# Patient Record
Sex: FEMALE | Race: WHITE | NOT HISPANIC OR LATINO | Employment: UNEMPLOYED | URBAN - METROPOLITAN AREA
[De-identification: names, ages, dates, MRNs, and addresses within clinical notes are randomized per-mention and may not be internally consistent; named-entity substitution may affect disease eponyms.]

---

## 2016-08-15 LAB
CHOLEST SERPL-MSCNC: 183 MG/DL (ref 0–200)
HDLC SERPL-MCNC: 58 MG/DL
LDLC SERPL CALC-MCNC: 102 MG/DL
TRIGL SERPL-MCNC: 102 MG/DL

## 2017-01-06 ENCOUNTER — PATIENT MESSAGE (OUTPATIENT)
Dept: TRANSPLANT | Facility: CLINIC | Age: 37
End: 2017-01-06

## 2017-01-11 ENCOUNTER — TELEPHONE (OUTPATIENT)
Dept: TRANSPLANT | Facility: CLINIC | Age: 37
End: 2017-01-11

## 2017-02-21 ENCOUNTER — PATIENT MESSAGE (OUTPATIENT)
Dept: TRANSPLANT | Facility: CLINIC | Age: 37
End: 2017-02-21

## 2017-02-24 ENCOUNTER — PATIENT MESSAGE (OUTPATIENT)
Dept: TRANSPLANT | Facility: CLINIC | Age: 37
End: 2017-02-24

## 2017-02-27 ENCOUNTER — PATIENT MESSAGE (OUTPATIENT)
Dept: TRANSPLANT | Facility: CLINIC | Age: 37
End: 2017-02-27

## 2017-03-01 LAB
EXT ALBUMIN: 4
EXT ALKALINE PHOSPHATASE: 113
EXT ALT: 14
EXT AST: 26
EXT BILIRUBIN TOTAL: 0.5
EXT BUN: 18
EXT CALCIUM: 9.1
EXT CHLORIDE: 99
EXT CO2: 27.8
EXT CREATININE: 1.14 MG/DL
EXT GLUCOSE: 96
EXT HBV DNA, QUALITATIVE PCR: NOT DETECTED
EXT HEMATOCRIT: 37.8
EXT HEMOGLOBIN: 13.4
EXT HEP B S AG: NEGATIVE
EXT PLATELETS: 203
EXT POTASSIUM: 3
EXT PROTEIN TOTAL: 7.5
EXT SODIUM: 139 MMOL/L
EXT TACROLIMUS LVL: 4.6
EXT WBC: 5.9

## 2017-03-03 ENCOUNTER — PATIENT MESSAGE (OUTPATIENT)
Dept: TRANSPLANT | Facility: CLINIC | Age: 37
End: 2017-03-03

## 2017-03-03 ENCOUNTER — TELEPHONE (OUTPATIENT)
Dept: TRANSPLANT | Facility: CLINIC | Age: 37
End: 2017-03-03

## 2017-03-03 NOTE — TELEPHONE ENCOUNTER
----- Message from Quita Wynn MD sent at 3/3/2017  3:13 PM CST -----  The Labs are stable - please let patient know.

## 2017-04-27 ENCOUNTER — PATIENT MESSAGE (OUTPATIENT)
Dept: TRANSPLANT | Facility: CLINIC | Age: 37
End: 2017-04-27

## 2017-05-01 ENCOUNTER — OFFICE VISIT (OUTPATIENT)
Dept: TRANSPLANT | Facility: CLINIC | Age: 37
End: 2017-05-01
Payer: OTHER GOVERNMENT

## 2017-05-01 VITALS
RESPIRATION RATE: 20 BRPM | HEART RATE: 103 BPM | WEIGHT: 129.44 LBS | TEMPERATURE: 98 F | DIASTOLIC BLOOD PRESSURE: 107 MMHG | HEIGHT: 67 IN | OXYGEN SATURATION: 100 % | SYSTOLIC BLOOD PRESSURE: 146 MMHG | BODY MASS INDEX: 20.31 KG/M2

## 2017-05-01 DIAGNOSIS — T86.49 OTHER COMPLICATION OF LIVER TRANSPLANT: ICD-10-CM

## 2017-05-01 DIAGNOSIS — K83.1 BILIARY OBSTRUCTION: ICD-10-CM

## 2017-05-01 DIAGNOSIS — Z85.828 HISTORY OF SKIN CANCER: ICD-10-CM

## 2017-05-01 DIAGNOSIS — Z94.4 S/P LIVER TRANSPLANT: Primary | ICD-10-CM

## 2017-05-01 DIAGNOSIS — M06.9 RHEUMATOID ARTHRITIS OF WRIST, UNSPECIFIED LATERALITY, UNSPECIFIED RHEUMATOID FACTOR PRESENCE: ICD-10-CM

## 2017-05-01 DIAGNOSIS — N39.0 RECURRENT UTI: ICD-10-CM

## 2017-05-01 DIAGNOSIS — R06.02 SOB (SHORTNESS OF BREATH): ICD-10-CM

## 2017-05-01 DIAGNOSIS — R53.82 CHRONIC FATIGUE: ICD-10-CM

## 2017-05-01 DIAGNOSIS — Z29.89 PROPHYLACTIC IMMUNOTHERAPY: ICD-10-CM

## 2017-05-01 DIAGNOSIS — R41.3 SHORT-TERM MEMORY LOSS: ICD-10-CM

## 2017-05-01 DIAGNOSIS — F10.10 ALCOHOL ABUSE, CONTINUOUS: ICD-10-CM

## 2017-05-01 DIAGNOSIS — M81.8 OTHER OSTEOPOROSIS WITHOUT CURRENT PATHOLOGICAL FRACTURE: ICD-10-CM

## 2017-05-01 PROCEDURE — 99214 OFFICE O/P EST MOD 30 MIN: CPT | Mod: PBBFAC | Performed by: INTERNAL MEDICINE

## 2017-05-01 PROCEDURE — 99215 OFFICE O/P EST HI 40 MIN: CPT | Mod: S$PBB,,, | Performed by: INTERNAL MEDICINE

## 2017-05-01 PROCEDURE — 99999 PR PBB SHADOW E&M-EST. PATIENT-LVL IV: CPT | Mod: PBBFAC,,, | Performed by: INTERNAL MEDICINE

## 2017-05-01 RX ORDER — PRAMIPEXOLE DIHYDROCHLORIDE 0.25 MG/1
0.25 TABLET ORAL 2 TIMES DAILY
COMMUNITY
End: 2018-04-30 | Stop reason: ALTCHOICE

## 2017-05-01 NOTE — LETTER
May 1, 2017        Blair Pedro  [address]  INTER[facility]ICINE CLINIC  [place] [address]  Phone: [phone]  Fax: [phone]     Kin Chambers  [address]  [place] [address]  Phone: [phone]  Fax: [phone]             [facility]  [address]  [facility] [address]  Phone: [phone]   Patient: Jose Berman   MR Number: [id number]   YOB: 1980   Date of Visit: 5/1/2017       Dear Dr. Kin Chambers, Blair Pedro    Thank you for referring Jose Berman to me for evaluation. Attached you will find relevant portions of my assessment and plan of care.    If you have questions, please do not hesitate to call me. I look forward to following Jose Berman along with you.    Sincerely,    Quita Wynn MD    Enclosure    If you would like to receive this communication electronically, please contact [email or web address] or [phone] to request [employer] Link access.    [employer] is a tool which provides read-only access to select patient information with whom you have a relationship. Its easy to use and provides real time access to review your patients record including encounter summaries, notes, results, and demographic information.    If you feel you have received this communication in error or would no longer like to receive these types of communications, please e-mail [email or web address]

## 2017-05-01 NOTE — PATIENT INSTRUCTIONS
[place] [facility] [place] and treat high blood pressure.  2.will get all the records and i will review  3. Check Mg level with next blood work  4. awating repeat liver tests-do them every 2 months  Return 6 months

## 2017-05-01 NOTE — MR AVS SNAPSHOT
[facility]  [address]  [facility] [address]  Phone: [phone]                  Jose Berman   2017 10:00 AM   Office Visit    Description:  Female : 1980   Provider:  Quita Wynn MD   Department:  [facility]           Reason for Visit     Liver Transplant Follow-up           Diagnoses this Visit        Comments    S/P liver transplant    -  Primary            To Do List           Goals (5 Years of Data)     None      [facility]     [facility] Care [facility] -  Assistance  Unless otherwise directed by your provider, please contact [facility], our nurse care line that is available for  assistance.     Registered nurses in the [facility] provide: appointment scheduling, clinical advisement, health education, and other advisory services.  Call: [phone] (toll free)               Medications           Message regarding Medications     Verify the changes and/or additions to your medication regime listed below are the same as discussed with your clinician today.  If any of these changes or additions are incorrect, please notify your healthcare provider.             Verify that the below list of medications is an accurate representation of the medications you are currently taking.  If none reported, the list may be blank. If incorrect, please contact your healthcare provider. Carry this list with you in case of emergency.           Current Medications     adalimumab (HUMIRA) 40 mg/0.8 mL injection Inject 40 mg into the skin every 14 (fourteen) days.    aspirin 81 MG Chew Take 1 tablet (81 mg total) by mouth once daily.    docusate sodium (COLACE) 100 MG capsule Take 1 capsule (100 mg total) by mouth 2 (two) times daily as needed for Constipation.    duloxetine (CYMBALTA) 60 MG capsule Take 120 mg by mouth once daily.    fludrocortisone (FLORINEF) 0.1 mg Tab Take 1 tablet (100 mcg total) by mouth every other  "day.    folic acid (FOLVITE) 1 MG tablet Take 1 tablet (1 mg total) by mouth once daily.    furosemide (LASIX) 20 MG tablet Take 20 mg by mouth once daily.     lamivudine (EPIVIR) 150 MG Tab Take 1 tablet (150 mg total) by mouth once daily.    multivitamin (THERAGRAN) tablet Take 1 tablet by mouth once daily.    pantoprazole (PROTONIX) 40 MG tablet Take 1 tablet (40 mg total) by mouth once daily.    pramipexole (MIRAPEX) 0.25 MG tablet Take 0.25 mg by mouth 2 (two) times daily.    PROAIR HFA 90 mcg/actuation inhaler     promethazine (PHENERGAN) 12.5 MG Tab Take 1 tablet (12.5 mg total) by mouth every 6 (six) hours as needed.    SEROQUEL  mg Tb24     tacrolimus (PROGRAF) 1 MG Cap Take 4 capsules (4 mg total) by mouth every 12 (twelve) hours.    thiamine 100 MG tablet Take 100 mg by mouth once daily.    trazodone (DESYREL) 100 MG tablet Take 100 mg by mouth nightly as needed for Insomnia.    ursodiol (ACTIGALL) 500 MG tablet Take 1 tablet (500 mg total) by mouth 2 (two) times daily.    VITAMIN B-1 50 MG tablet     AFRIN, OXYMETAZOLINE, 0.05 % nasal spray     fluticasone (FLONASE) 50 mcg/actuation nasal spray            Clinical Reference Information           Your Vitals Were     BP Pulse Temp Resp Height Weight    146/107 (BP Location: Right arm, Patient Position: Sitting, BP Method: Automatic) 103 98.1 °F (36.7 °C) (Oral) 20 5' 7" (1.702 m) 58.7 kg (129 lb 6.6 oz)    SpO2 BMI             100% 20.27 kg/m2         Blood Pressure          Most Recent Value    BP  (!)  146/107      Allergies as of 5/1/2017     Methadone    Penicillins      Immunizations Administered on Date of Encounter - 5/1/2017     None      Maintenance Dialysis History     Patient has no recorded history of maintenance dialysis.      Transplant Information        Txp Date Organ Coordinator Care Team    8/26/2015 Liver Marie Ramos RN Current Hepatologist:  Quita Wynn MD   Referring Physician:  Kin Chambers MD   Corresponding " Physician:  Kin Chambers MD   Surgeon:  Dewayne Spann MD   Assisting Surgeon:  Justin Miles MD   Assisting Surgeon:  Oleksandr Benjamin MD   Assisting Surgeon:  Marilee Rodriguez MD   Current PCP:  Blair Pedor MD   Corresponding Physician:  Blair Pedro MD         Instructions    [place] and treat high blood pressure.  2.will get all the records and i will review  3. Check Mg level with next blood work  4. awating repeat liver tests-do them every 2 months  Return 6 months           Smoking Cessation     If you would like to quit smoking:   You may be eligible for free services if you are a [place] resident and started smoking cigarettes before September 1, 1988.  Call the Smoking Cessation Trust ([facility]) toll free at ([phone] or [phone].   Call 1-800-QUIT-NOW if you do not meet the above criteria.   Contact us via email: [email or web address]   View our website for more information: [email or web address]        Language Assistance Services     ATTENTION: Language assistance services are available, free of charge. Please call [phone].      ATENCIÓN: Si habla español, tiene a villalobos disposición servicios gratuitos de asistencia lingüística. Llame al [phone].     CHÚ Ý: N?u b?n nói Ti?ng Vi?t, có các d?ch v? h? tr? ngôn ng? mi?n phí dành cho b?n. G?i s? [phone].         [facility] complies with applicable Federal civil rights laws and does not discriminate on the basis of race, color, national origin, age, disability, or sex.

## 2017-05-01 NOTE — PROGRESS NOTES
Transplant Hepatology  Liver Transplant Recipient Follow-up    Transplant Date: 2015  UNOS Native Liver Dx: Alcoholic Cirrhosis    Jose is here for follow up of her liver transplant.    ORGAN: LIVER  Whole or Partial: whole liver  Donor Type:  - brain death  CDC High Risk: no  Donor CMV Status: positive  Donor HCV Status: negative  Donor HBcAb: positive  Biliary Anastomosis: end to end  Arterial Anatomy: standard  IVC reconstruction: end to end ivc  Portal vein status: patent    Mrs. Berman is a 35 year old female s/p OLTX on 8/26/15 2/2 alcoholic cirrhosis. Course complicated course with SHIVAM requiring HD, respiratory failure requiring trach (now extubated and with trach stoma sealed breathing on room air) on chronic immunosuppression with prograf    Subjective:     HPI: Jose is now 20 months post liver transplant.     Since liver transplant discharge home, she has been hospitalized x 2 for narcotic overdose since transplant and turned down an offer of rehab. We were told that she received narcotics from 5-6 MDs. She has previously denied this. Her understanding is that she had a drug-drug interaction (muscle relaxants and sleeping aids).Post liver transplant a PETH test was checked and it came back elevated at 146 which indicates moderate alcohol use. She does admit to one drink but denies excessive use.     Interval History: Since I last saw her she has had an extensive evaluation (EEG, MRI brain, cxr, 2D echo, sleep study) by rheumatology, ID, neurology and dermatology for joint pain, frequent falls, memory loss, alopecia, recurrent UTIs. This work up was done for fatigue and SOB. She tells me she has been dx with RA and is now on Humira every 2 weeks (has been on it for a month). The remainder of her w/u by report was unrevealing. She continues to have frequent UTIs (on a monthly basis). She continues to periodically have some lower extremity edema for which she takes lasix 20 mg prn. This  symptom as well as her pruritus seems to be much better since she started jamari.     She did not have skin cancer when she went to see the dermatology (she has a history).    Her labs have improved and thus a PTC was deferred. She he is now off cellcept and seems to be tolerating the decrease in immunosuppression. I will therefore continue monotherapy to prevent rejection.    She did have a bone density and it has revealed osteoporosis of the hip and osteopenia of the back. Not clear if she has seen an endocrinologist..    Review of Systems   Constitutional: Negative.    HENT: Negative.    Eyes: Negative.    Respiratory: Negative.    Cardiovascular: Negative.    Genitourinary: Negative.    Musculoskeletal: Negative.    Skin:        alopecia   Neurological: Negative.    Psychiatric/Behavioral: Negative.        Objective:     Physical Exam   Constitutional: She is oriented to person, place, and time. She appears well-developed and well-nourished.   HENT:   Head: Normocephalic and atraumatic.   Eyes: Conjunctivae and EOM are normal. Pupils are equal, round, and reactive to light. No scleral icterus.   Neck: Normal range of motion. Neck supple. No thyromegaly present.   Cardiovascular: Normal rate, regular rhythm and normal heart sounds.    Pulmonary/Chest: Effort normal and breath sounds normal. She has no rales.   Abdominal: Soft. Bowel sounds are normal. She exhibits no distension and no mass.   Musculoskeletal: Normal range of motion. She exhibits no edema.   Neurological: She is alert and oriented to person, place, and time.   Skin: Skin is warm and dry. No rash noted.   Psychiatric: She has a normal mood and affect.   Vitals reviewed.    Lab Results   Component Value Date    BILITOT 0.4 02/22/2016    AST 25 02/22/2016    ALT 28 02/22/2016    ALKPHOS 83 02/22/2016    CREATININE 1.1 02/22/2016    ALBUMIN 3.3 (L) 02/22/2016     Lab Results   Component Value Date    WBC 5.15 02/22/2016    HGB 10.8 (L) 02/22/2016    HCT  33.0 (L) 02/22/2016    HCT 29 (L) 10/15/2015     02/22/2016     Lab Results   Component Value Date    TACROLIMUS 5.0 02/22/2016     I have not labs to review today.    Assessment/Plan:   The patient is now ~20 months post liver transplant. Current recommendations:  1. History of hospitalizations for narcotic overdose and +PETH test. I still am recommending that she be enrolled in rehab locally. Due to insurance issues she can not be evaluated by addiction psych at our institution. Check tox and peth tests periodically.  2. Complication of liver transplant: dilated ducts suggestive of obstruction. Continue to defer PTC since liver numbers and pruritus are improved on jamari. Monitor  3. S/p liver transplant and control of IS: prograf target 4-7. Continue off cellcept. Check labs every 2 months  4. Alopecia. Improving on rhogain- continue.  4. History of skin cancer: recommend dermatologic follow up every 6 months.   5. Recurrent UTI: continues. I will obtain ID notes and review. Will minimize IS  6. Osteoporosis. Insurance denied eval at [facility]- [facility]  7. Fatigue and SOB: unclear etiology. I will review the records when we obtain them.  8. Short term memory loss of unclear etiology. I will review records form neurology.  9. Joint pain, by report RA: obtain records from rheumatologist. Ok to continue humira.  10. HTN: f/u with pcp.  11. Cramping in the hands and feet- check mg with labs    Return 6 month.    Quita Wynn MD           [facility] Patient Status  Functional Status: 70% - Cares for self: unable to carry on normal activity or active work  Physical Capacity: Limited Mobility  . . .

## 2017-05-04 ENCOUNTER — PATIENT MESSAGE (OUTPATIENT)
Dept: TRANSPLANT | Facility: CLINIC | Age: 37
End: 2017-05-04

## 2017-05-23 ENCOUNTER — TELEPHONE (OUTPATIENT)
Dept: TRANSPLANT | Facility: CLINIC | Age: 37
End: 2017-05-23

## 2017-06-22 ENCOUNTER — TELEPHONE (OUTPATIENT)
Dept: TRANSPLANT | Facility: CLINIC | Age: 37
End: 2017-06-22

## 2017-07-10 ENCOUNTER — PATIENT MESSAGE (OUTPATIENT)
Dept: TRANSPLANT | Facility: CLINIC | Age: 37
End: 2017-07-10

## 2017-07-10 LAB
EXT ALBUMIN: 3.8
EXT ALKALINE PHOSPHATASE: 88
EXT ALT: 12
EXT AST: 19
EXT BILIRUBIN TOTAL: 0.4
EXT BUN: 17
EXT CALCIUM: 9.1
EXT CHLORIDE: 109
EXT CO2: 25.5
EXT CREATININE: 0.89 MG/DL
EXT EOSINOPHIL%: 2.7
EXT GGT: 36
EXT GLUCOSE: 86
EXT HBV DNA, QUALITATIVE PCR: NOT DETECTED
EXT HEMATOCRIT: 43.4
EXT HEMOGLOBIN: 13.8
EXT HEP B S AG: REACTIVE
EXT LYMPH%: 38.1
EXT MAGNESIUM: 1.8
EXT MONOCYTES%: 8.6
EXT PLATELETS: 228
EXT POTASSIUM: 4.8
EXT PROTEIN TOTAL: 6.9
EXT SEGS%: 49.6
EXT SODIUM: 143 MMOL/L
EXT TACROLIMUS LVL: 9
EXT WBC: 5.1
Lab: NORMAL
PHOSPHATIDYLETHANOL (PETH): 39

## 2017-07-18 ENCOUNTER — TELEPHONE (OUTPATIENT)
Dept: TRANSPLANT | Facility: CLINIC | Age: 37
End: 2017-07-18

## 2017-07-18 ENCOUNTER — PATIENT MESSAGE (OUTPATIENT)
Dept: TRANSPLANT | Facility: CLINIC | Age: 37
End: 2017-07-18

## 2017-07-18 NOTE — TELEPHONE ENCOUNTER
----- Message from Quita Wynn MD sent at 7/17/2017  1:25 AM CDT -----  The Labs are stable - please let patient know.

## 2017-08-02 ENCOUNTER — TELEPHONE (OUTPATIENT)
Dept: TRANSPLANT | Facility: CLINIC | Age: 37
End: 2017-08-02

## 2017-08-02 ENCOUNTER — PATIENT MESSAGE (OUTPATIENT)
Dept: TRANSPLANT | Facility: CLINIC | Age: 37
End: 2017-08-02

## 2017-08-02 NOTE — TELEPHONE ENCOUNTER
----- Message from Quita Wynn MD sent at 8/1/2017 10:47 PM CDT -----  Contact her to stop drinking and to stop THC

## 2017-08-02 NOTE — TELEPHONE ENCOUNTER
Left message for pt to discuss positive alcohol and drug screen with no answer. Will continue to try and reach pt.

## 2017-08-14 RX ORDER — PANTOPRAZOLE SODIUM 40 MG/1
40 TABLET, DELAYED RELEASE ORAL DAILY
Qty: 90 TABLET | Refills: 3 | Status: SHIPPED | OUTPATIENT
Start: 2017-08-14 | End: 2018-11-19 | Stop reason: SDUPTHER

## 2017-08-14 RX ORDER — FLUDROCORTISONE ACETATE 0.1 MG/1
100 TABLET ORAL EVERY OTHER DAY
Qty: 45 TABLET | Refills: 3 | Status: SHIPPED | OUTPATIENT
Start: 2017-08-14 | End: 2018-11-19 | Stop reason: SDUPTHER

## 2017-08-14 RX ORDER — URSODIOL 500 MG/1
500 TABLET, FILM COATED ORAL 2 TIMES DAILY
Qty: 180 TABLET | Refills: 3 | Status: SHIPPED | OUTPATIENT
Start: 2017-08-14 | End: 2018-11-19 | Stop reason: SDUPTHER

## 2017-08-14 RX ORDER — FOLIC ACID 1 MG/1
1 TABLET ORAL DAILY
Qty: 90 TABLET | Refills: 3 | Status: SHIPPED | OUTPATIENT
Start: 2017-08-14 | End: 2019-08-27 | Stop reason: SDUPTHER

## 2017-08-14 RX ORDER — LAMIVUDINE 150 MG/1
150 TABLET, FILM COATED ORAL DAILY
Qty: 90 TABLET | Refills: 3 | Status: SHIPPED | OUTPATIENT
Start: 2017-08-14 | End: 2018-11-19 | Stop reason: SDUPTHER

## 2017-08-14 RX ORDER — TACROLIMUS 1 MG/1
4 CAPSULE ORAL EVERY 12 HOURS
Qty: 720 CAPSULE | Refills: 3 | Status: SHIPPED | OUTPATIENT
Start: 2017-08-14 | End: 2018-09-24 | Stop reason: SDUPTHER

## 2017-08-14 RX ORDER — NAPROXEN SODIUM 220 MG/1
81 TABLET, FILM COATED ORAL DAILY
Qty: 90 TABLET | Refills: 3 | Status: SHIPPED | OUTPATIENT
Start: 2017-08-14 | End: 2020-05-01 | Stop reason: SDUPTHER

## 2017-09-14 ENCOUNTER — TELEPHONE (OUTPATIENT)
Dept: TRANSPLANT | Facility: CLINIC | Age: 37
End: 2017-09-14

## 2017-09-14 NOTE — TELEPHONE ENCOUNTER
----- Message from Tyesha Meier sent at 9/14/2017  9:45 AM CDT -----  Called pt on 9/14/17, left a message to have lab's done on 7/18/17.

## 2017-09-29 ENCOUNTER — TELEPHONE (OUTPATIENT)
Dept: TRANSPLANT | Facility: CLINIC | Age: 37
End: 2017-09-29

## 2017-10-06 ENCOUNTER — TELEPHONE (OUTPATIENT)
Dept: TRANSPLANT | Facility: CLINIC | Age: 37
End: 2017-10-06

## 2017-10-06 NOTE — TELEPHONE ENCOUNTER
----- Message from Bhavna Quigley sent at 10/6/2017  8:11 AM CDT -----  Contact: pt  Patient calling to speak with pako. Patient has a few questions.       Please call        Thanks!!

## 2017-10-06 NOTE — TELEPHONE ENCOUNTER
Returned call answered questions, patient may possibly relocate and wanted to know about transferrring care.

## 2017-11-03 ENCOUNTER — PATIENT MESSAGE (OUTPATIENT)
Dept: TRANSPLANT | Facility: CLINIC | Age: 37
End: 2017-11-03

## 2017-11-14 ENCOUNTER — TELEPHONE (OUTPATIENT)
Dept: TRANSPLANT | Facility: CLINIC | Age: 37
End: 2017-11-14

## 2017-11-14 NOTE — TELEPHONE ENCOUNTER
No lab results received after multiple requests for pt to have labs done. Will send missed lab letter.

## 2017-12-20 ENCOUNTER — TELEPHONE (OUTPATIENT)
Dept: TRANSPLANT | Facility: CLINIC | Age: 37
End: 2017-12-20

## 2017-12-20 NOTE — TELEPHONE ENCOUNTER
No lab results received after multiple requests for pt to have labs done. Will send missed lab letter

## 2018-01-24 ENCOUNTER — TELEPHONE (OUTPATIENT)
Dept: TRANSPLANT | Facility: CLINIC | Age: 38
End: 2018-01-24

## 2018-01-24 NOTE — TELEPHONE ENCOUNTER
No labs or communication from pt after 2 missed lab letters, will send certified missed lab letter.

## 2018-04-24 ENCOUNTER — TELEPHONE (OUTPATIENT)
Dept: TRANSPLANT | Facility: CLINIC | Age: 38
End: 2018-04-24

## 2018-04-24 NOTE — TELEPHONE ENCOUNTER
----- Message from Rosa Elena Rosales sent at 4/24/2018 12:26 PM CDT -----  Contact: [facility]  Calling to get clarification on directions for lamivudine (EPIVIR) 150 MG Tab    [facility] [phone]

## 2018-04-30 ENCOUNTER — OFFICE VISIT (OUTPATIENT)
Dept: TRANSPLANT | Facility: CLINIC | Age: 38
End: 2018-04-30
Payer: MEDICAID

## 2018-04-30 VITALS
BODY MASS INDEX: 23.39 KG/M2 | SYSTOLIC BLOOD PRESSURE: 141 MMHG | HEIGHT: 67 IN | WEIGHT: 149.06 LBS | DIASTOLIC BLOOD PRESSURE: 100 MMHG | OXYGEN SATURATION: 99 % | RESPIRATION RATE: 16 BRPM | HEART RATE: 105 BPM | TEMPERATURE: 98 F

## 2018-04-30 DIAGNOSIS — K83.1 BILIARY OBSTRUCTION: ICD-10-CM

## 2018-04-30 DIAGNOSIS — Z94.4 S/P LIVER TRANSPLANT: Primary | ICD-10-CM

## 2018-04-30 DIAGNOSIS — Z85.828 HISTORY OF SKIN CANCER: ICD-10-CM

## 2018-04-30 DIAGNOSIS — F10.10 ALCOHOL ABUSE, CONTINUOUS: ICD-10-CM

## 2018-04-30 DIAGNOSIS — Z29.89 PROPHYLACTIC IMMUNOTHERAPY: ICD-10-CM

## 2018-04-30 DIAGNOSIS — M81.8 OTHER OSTEOPOROSIS WITHOUT CURRENT PATHOLOGICAL FRACTURE: ICD-10-CM

## 2018-04-30 DIAGNOSIS — N39.0 RECURRENT UTI: ICD-10-CM

## 2018-04-30 DIAGNOSIS — T86.49 OTHER COMPLICATION OF LIVER TRANSPLANT: ICD-10-CM

## 2018-04-30 PROCEDURE — 99214 OFFICE O/P EST MOD 30 MIN: CPT | Mod: PBBFAC | Performed by: INTERNAL MEDICINE

## 2018-04-30 PROCEDURE — 99215 OFFICE O/P EST HI 40 MIN: CPT | Mod: S$PBB,,, | Performed by: INTERNAL MEDICINE

## 2018-04-30 PROCEDURE — 99999 PR PBB SHADOW E&M-EST. PATIENT-LVL IV: CPT | Mod: PBBFAC,,, | Performed by: INTERNAL MEDICINE

## 2018-04-30 NOTE — PROGRESS NOTES
Transplant Hepatology  Liver Transplant Recipient Follow-up    Transplant Date: 2015  UNOS Native Liver Dx: Alcoholic Cirrhosis    Jose is here for follow up of her liver transplant.    ORGAN: LIVER  Whole or Partial: whole liver  Donor Type:  - brain death  CDC High Risk: no  Donor CMV Status: positive  Donor HCV Status: negative  Donor HBcAb: positive  Biliary Anastomosis: end to end  Arterial Anatomy: standard  IVC reconstruction: end to end ivc  Portal vein status: patent    Mrs. Berman is a 35 year old female s/p OLTX on 8/26/15 2/2 alcoholic cirrhosis. Course complicated course with SHIVAM requiring HD, respiratory failure requiring trach (now extubated and with trach stoma sealed breathing on room air) on chronic immunosuppression with prograf    Subjective:     HPI: Jose is now 32 months post liver transplant.  After she was discharged home from transplant she was hospitalized x 2 for narcotic overdose and turned down an offer of rehab. We were told that she received narcotics from 5-6 MDs. She also was drinking excessively as evidenced by an elevated peth test.    Interval History: She has not had labs since . She has gotten  and no longer has insurance through the . 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 She denies drinking alcohol. She has gained about 20 lbs. She denies drugs or alcohol use. She feels well with no N/V, abdominal pain or diarrhea.    She did have a bone density in 2016 and it did not reveal osteoporosis of the hip and osteopenia of the back. She is not seeing a dermatologist regularly to screen for skin cancer. She does have a history of skin cancer.    Review of Systems   Constitutional: Negative.    HENT: Negative.    Eyes: Negative.    Respiratory: Negative.    Cardiovascular: Negative.    Genitourinary: Negative.    Musculoskeletal: Negative.    Skin:        alopecia   Neurological: Negative.    Psychiatric/Behavioral:  Negative.        Objective:     Physical Exam   Constitutional: She is oriented to person, place, and time. She appears well-developed and well-nourished.   HENT:   Head: Normocephalic and atraumatic.   Eyes: Conjunctivae and EOM are normal. Pupils are equal, round, and reactive to light. No scleral icterus.   Neck: Normal range of motion. Neck supple. No thyromegaly present.   Cardiovascular: Normal rate, regular rhythm and normal heart sounds.    Pulmonary/Chest: Effort normal and breath sounds normal. She has no rales.   Abdominal: Soft. Bowel sounds are normal. She exhibits no distension and no mass.   Musculoskeletal: Normal range of motion. She exhibits no edema.   Neurological: She is alert and oriented to person, place, and time.   Skin: Skin is warm and dry. No rash noted.   Psychiatric: She has a normal mood and affect.   Vitals reviewed.    Lab Results   Component Value Date    BILITOT 0.4 02/22/2016    AST 25 02/22/2016    ALT 28 02/22/2016    ALKPHOS 83 02/22/2016    CREATININE 1.1 02/22/2016    ALBUMIN 3.3 (L) 02/22/2016     Lab Results   Component Value Date    WBC 5.15 02/22/2016    HGB 10.8 (L) 02/22/2016    HCT 33.0 (L) 02/22/2016    HCT 29 (L) 10/15/2015     02/22/2016     Lab Results   Component Value Date    TACROLIMUS 5.0 02/22/2016     I have not labs to review today.    Assessment/Plan:   The patient is now ~32 months post liver transplant. Current recommendations:  1. History of hospitalizations for narcotic overdose and +PETH test. Check tox and peth test today.  2. Complication of liver transplant: dilated ducts suggestive of obstruction. Continue to defer PTC if her liver numbers remain improved on jamari. Check labs today.  3. S/p liver transplant and control of IS: prograf target 4-7. Continue off cellcept. Check labs today every 2 months  4. Alopecia. Monitor; use rhogaine  4. History of skin cancer: recommend dermatologic follow up every 6 months.   5. Recurrent UTI: continues. I  will obtain ID notes and review. Will minimize IS  6. Osteoporosis.bone density now  7. Hx of utis- monitor      Return 12 months    Quita Wynn MD       Addendum: PATIENT DID NOT DO LABS TODAY AS INSTRUCTED    UNOS Patient Status  Functional Status: 70% - Cares for self: unable to carry on normal activity or active work  Physical Capacity: Limited Mobility  . . . .

## 2018-04-30 NOTE — PATIENT INSTRUCTIONS
1. Labs today  2. Standing order for labs  3. You will need regular mammograms, pap tests and dermatologist every 6 months  4. Bone density now  5. Monitor for UTIs  6. rhogaine ok for alopecia  Return 1 year

## 2018-04-30 NOTE — LETTER
May 6, 2018        Blair Pedro  [address]  INTERNAL MEDICINE CLINIC  [place] [address]  Phone: [phone]  Fax: [phone]     Kin Chambers  [address]  [place] [address]  Phone: [phone]  Fax: [phone]             [facility]  [address]  [facility] [address]  Phone: [phone]   Patient: Jose Berman   MR Number: [id number]   YOB: 1980   Date of Visit: 4/30/2018       Dear Dr. Kin Chambers, Blair Pedro    Thank you for referring Jose Berman to me for evaluation. Attached you will find relevant portions of my assessment and plan of care.    If you have questions, please do not hesitate to call me. I look forward to following Jose Berman along with you.    Sincerely,    Quita Wynn MD    Enclosure    If you would like to receive this communication electronically, please contact [email or web address] or [phone] to request [employer] access.    [employer] is a tool which provides read-only access to select patient information with whom you have a relationship. Its easy to use and provides real time access to review your patients record including encounter summaries, notes, results, and demographic information.    If you feel you have received this communication in error or would no longer like to receive these types of communications, please e-mail [email or web address]

## 2018-08-08 ENCOUNTER — PATIENT MESSAGE (OUTPATIENT)
Dept: TRANSPLANT | Facility: CLINIC | Age: 38
End: 2018-08-08

## 2018-08-17 ENCOUNTER — TELEPHONE (OUTPATIENT)
Dept: TRANSPLANT | Facility: CLINIC | Age: 38
End: 2018-08-17

## 2018-08-17 NOTE — TELEPHONE ENCOUNTER
Left voicemail for pt to contact office asap to schedule labs or will make status lost to follow.

## 2018-09-24 RX ORDER — TACROLIMUS 1 MG/1
4 CAPSULE ORAL EVERY 12 HOURS
Qty: 240 CAPSULE | Refills: 0 | Status: SHIPPED | OUTPATIENT
Start: 2018-09-24 | End: 2018-11-19 | Stop reason: SDUPTHER

## 2018-09-24 NOTE — TELEPHONE ENCOUNTER
Spoke to pt who is asking for refill of prograf sent to pharmacy. Informed pt she has not had transplant labs in over 1 year, pt has also not responded to phone calls, my [employer] messages, or missed lab letters. Pt states she will have labs done, will fax updated orders. Will send 1 month supply of medication as pt states she is almost currently out.

## 2018-09-24 NOTE — TELEPHONE ENCOUNTER
----- Message from Kasia Garcia sent at 9/24/2018 11:52 AM CDT -----  Contact: PATIENT  Needs Advice    Reason for call: Refill for prograf sent to [facility] [phone]        Communication Preference: [phone]    Additional Information: n/a

## 2018-09-28 ENCOUNTER — TELEPHONE (OUTPATIENT)
Dept: TRANSPLANT | Facility: CLINIC | Age: 38
End: 2018-09-28

## 2018-09-28 NOTE — TELEPHONE ENCOUNTER
----- Message from Edouard Hernandez sent at 9/28/2018  4:27 PM CDT -----  Contact: [facility]  [facility] calling for prior authorization on  tacrolimus (PROGRAF) 1 MG Cap medication for this patient.           Pharmacy number  [phone]        Thanks!

## 2018-10-05 ENCOUNTER — TELEPHONE (OUTPATIENT)
Dept: TRANSPLANT | Facility: CLINIC | Age: 38
End: 2018-10-05

## 2018-10-05 NOTE — TELEPHONE ENCOUNTER
Called pt's lab who states she has not had labs drawn after being sent certified letter and also being told she would be unable to be followed as a pt if she was not compliant with labs.

## 2018-11-15 LAB
EXT ALBUMIN: 3.9
EXT ALKALINE PHOSPHATASE: 188
EXT ALT: 23
EXT AST: 23
EXT BILIRUBIN TOTAL: 0.5
EXT BUN: 24
EXT CALCIUM: 8.5
EXT CHLORIDE: 108
EXT CO2: 27
EXT CREATININE: 0.92 MG/DL
EXT EOSINOPHIL%: 1.4
EXT GLUCOSE: 81
EXT HBV DNA, QUALITATIVE PCR: NOT DETECTED
EXT HEMATOCRIT: 39.1
EXT HEMOGLOBIN: 13
EXT HEP B S AG: NEGATIVE
EXT LYMPH%: 45.5
EXT MAGNESIUM: 2.2
EXT MONOCYTES%: 8.8
EXT PLATELETS: 210
EXT POTASSIUM: 4.4
EXT PROTEIN TOTAL: 8.3
EXT SEGS%: 43.5
EXT SODIUM: 140 MMOL/L
EXT TACROLIMUS LVL: 5.5
EXT WBC: 4.9
Lab: NORMAL
PHOSPHATIDYLETHANOL (PETH): NEGATIVE

## 2018-11-17 ENCOUNTER — PATIENT MESSAGE (OUTPATIENT)
Dept: TRANSPLANT | Facility: CLINIC | Age: 38
End: 2018-11-17

## 2018-11-19 RX ORDER — LAMIVUDINE 150 MG/1
150 TABLET, FILM COATED ORAL DAILY
Qty: 30 TABLET | Refills: 5 | Status: SHIPPED | OUTPATIENT
Start: 2018-11-19 | End: 2020-05-01 | Stop reason: SDUPTHER

## 2018-11-19 RX ORDER — FLUDROCORTISONE ACETATE 0.1 MG/1
100 TABLET ORAL EVERY OTHER DAY
Qty: 15 TABLET | Refills: 5 | Status: SHIPPED | OUTPATIENT
Start: 2018-11-19 | End: 2019-08-27 | Stop reason: SDUPTHER

## 2018-11-19 RX ORDER — PANTOPRAZOLE SODIUM 40 MG/1
40 TABLET, DELAYED RELEASE ORAL DAILY
Qty: 30 TABLET | Refills: 5 | Status: SHIPPED | OUTPATIENT
Start: 2018-11-19 | End: 2020-05-01 | Stop reason: SDUPTHER

## 2018-11-19 RX ORDER — TACROLIMUS 1 MG/1
4 CAPSULE ORAL EVERY 12 HOURS
Qty: 240 CAPSULE | Refills: 5 | Status: SHIPPED | OUTPATIENT
Start: 2018-11-19 | End: 2020-05-01 | Stop reason: SDUPTHER

## 2018-11-19 RX ORDER — URSODIOL 500 MG/1
500 TABLET, FILM COATED ORAL 2 TIMES DAILY
Qty: 60 TABLET | Refills: 5 | Status: SHIPPED | OUTPATIENT
Start: 2018-11-19 | End: 2020-05-01 | Stop reason: SDUPTHER

## 2018-11-20 ENCOUNTER — TELEPHONE (OUTPATIENT)
Dept: TRANSPLANT | Facility: CLINIC | Age: 38
End: 2018-11-20

## 2018-11-20 NOTE — TELEPHONE ENCOUNTER
----- Message from Quita Wynn MD sent at 11/19/2018  4:14 PM CST -----  The Labs are stable - please let patient know.

## 2018-11-20 NOTE — TELEPHONE ENCOUNTER
Labs reviewed are stable, continue q 3 months. Letter sent. Urged pt to remain compliant with labs.

## 2019-02-20 ENCOUNTER — TELEPHONE (OUTPATIENT)
Dept: TRANSPLANT | Facility: CLINIC | Age: 39
End: 2019-02-20

## 2019-02-20 NOTE — TELEPHONE ENCOUNTER
----- Message from Rohini Beck MA sent at 2/20/2019  3:09 PM CST -----  Attempted to contact patient several times to request her to go have her labs drawn locally at [facility] with no results.    2-19-19@10:30 am [facility] [phone] No labs this year per [facility].    2-19@10:37am [phone]  Fast busy 2x         @10:39am [phone]  Left message - answer machine    2-20@11:53am  [phone]  Fast busy 2x         @11:54am  [phone]  Left message - answer machine

## 2019-03-12 ENCOUNTER — TELEPHONE (OUTPATIENT)
Dept: TRANSPLANT | Facility: CLINIC | Age: 39
End: 2019-03-12

## 2019-03-12 NOTE — TELEPHONE ENCOUNTER
----- Message from Rosa Elena Rosales sent at 3/12/2019 11:18 AM CDT -----  Contact: [facility]  Pharmacy Calling    Reason for call: refill    Pharmacy Name: [facility]    Prescription Name: furosemide (LASIX) 20 MG tablet and duloxetine (CYMBALTA) 60 MG capsule    Phone Number: [phone]/Fax [phone]    Additional Information:

## 2019-04-25 ENCOUNTER — TELEPHONE (OUTPATIENT)
Dept: TRANSPLANT | Facility: CLINIC | Age: 39
End: 2019-04-25

## 2019-06-26 ENCOUNTER — TELEPHONE (OUTPATIENT)
Dept: TRANSPLANT | Facility: CLINIC | Age: 39
End: 2019-06-26

## 2019-07-29 ENCOUNTER — PATIENT MESSAGE (OUTPATIENT)
Dept: TRANSPLANT | Facility: CLINIC | Age: 39
End: 2019-07-29

## 2019-07-30 ENCOUNTER — PATIENT OUTREACH (OUTPATIENT)
Dept: ADMINISTRATIVE | Facility: HOSPITAL | Age: 39
End: 2019-07-30

## 2019-08-27 ENCOUNTER — TELEPHONE (OUTPATIENT)
Dept: FAMILY MEDICINE | Facility: CLINIC | Age: 39
End: 2019-08-27

## 2019-08-27 ENCOUNTER — OFFICE VISIT (OUTPATIENT)
Dept: FAMILY MEDICINE | Facility: CLINIC | Age: 39
End: 2019-08-27
Payer: MEDICAID

## 2019-08-27 VITALS
HEIGHT: 67 IN | HEART RATE: 84 BPM | BODY MASS INDEX: 22.16 KG/M2 | WEIGHT: 141.19 LBS | TEMPERATURE: 98 F | DIASTOLIC BLOOD PRESSURE: 89 MMHG | SYSTOLIC BLOOD PRESSURE: 128 MMHG | OXYGEN SATURATION: 98 % | RESPIRATION RATE: 14 BRPM

## 2019-08-27 DIAGNOSIS — M81.6 LOCALIZED OSTEOPOROSIS WITHOUT CURRENT PATHOLOGICAL FRACTURE: ICD-10-CM

## 2019-08-27 DIAGNOSIS — R06.02 SOB (SHORTNESS OF BREATH): ICD-10-CM

## 2019-08-27 DIAGNOSIS — Z12.39 BREAST CANCER SCREENING: ICD-10-CM

## 2019-08-27 DIAGNOSIS — F32.A DEPRESSION, UNSPECIFIED DEPRESSION TYPE: ICD-10-CM

## 2019-08-27 DIAGNOSIS — A54.9 GONORRHEA: ICD-10-CM

## 2019-08-27 DIAGNOSIS — F52.6 SEXUAL PAIN DISORDER: ICD-10-CM

## 2019-08-27 DIAGNOSIS — J30.2 SEASONAL ALLERGIES: ICD-10-CM

## 2019-08-27 DIAGNOSIS — B37.31 VAGINAL YEAST INFECTION: ICD-10-CM

## 2019-08-27 DIAGNOSIS — Z76.89 ENCOUNTER TO ESTABLISH CARE: Primary | ICD-10-CM

## 2019-08-27 DIAGNOSIS — N94.9 GYNECOLOGICAL DISORDER: ICD-10-CM

## 2019-08-27 DIAGNOSIS — R10.2 VAGINAL PAIN: ICD-10-CM

## 2019-08-27 DIAGNOSIS — N39.0 URINARY TRACT INFECTION WITHOUT HEMATURIA, SITE UNSPECIFIED: ICD-10-CM

## 2019-08-27 DIAGNOSIS — Z94.4 S/P LIVER TRANSPLANT: ICD-10-CM

## 2019-08-27 DIAGNOSIS — Z13.220 LIPID SCREENING: ICD-10-CM

## 2019-08-27 DIAGNOSIS — Z79.899 HIGH RISK MEDICATION USE: ICD-10-CM

## 2019-08-27 PROCEDURE — 96372 PR INJECTION,THERAP/PROPH/DIAG2ST, IM OR SUBCUT: ICD-10-PCS | Mod: S$GLB,,, | Performed by: NURSE PRACTITIONER

## 2019-08-27 PROCEDURE — 99205 OFFICE O/P NEW HI 60 MIN: CPT | Mod: 25,S$GLB,, | Performed by: NURSE PRACTITIONER

## 2019-08-27 PROCEDURE — 96372 THER/PROPH/DIAG INJ SC/IM: CPT | Mod: S$GLB,,, | Performed by: NURSE PRACTITIONER

## 2019-08-27 PROCEDURE — 87086 URINE CULTURE/COLONY COUNT: CPT

## 2019-08-27 PROCEDURE — 99205 PR OFFICE/OUTPT VISIT, NEW, LEVL V, 60-74 MIN: ICD-10-PCS | Mod: 25,S$GLB,, | Performed by: NURSE PRACTITIONER

## 2019-08-27 RX ORDER — ALBUTEROL SULFATE 90 UG/1
2 AEROSOL, METERED RESPIRATORY (INHALATION) EVERY 6 HOURS PRN
Qty: 18 G | Refills: 5 | Status: SHIPPED | OUTPATIENT
Start: 2019-08-27 | End: 2020-05-04 | Stop reason: SDUPTHER

## 2019-08-27 RX ORDER — FLUTICASONE PROPIONATE 50 MCG
1 SPRAY, SUSPENSION (ML) NASAL 2 TIMES DAILY
Qty: 1 BOTTLE | Refills: 5 | Status: SHIPPED | OUTPATIENT
Start: 2019-08-27 | End: 2020-05-04 | Stop reason: SDUPTHER

## 2019-08-27 RX ORDER — FLUDROCORTISONE ACETATE 0.1 MG/1
100 TABLET ORAL EVERY OTHER DAY
Qty: 15 TABLET | Refills: 5 | Status: SHIPPED | OUTPATIENT
Start: 2019-08-27 | End: 2023-10-18

## 2019-08-27 RX ORDER — FLUCONAZOLE 150 MG/1
150 TABLET ORAL DAILY
Qty: 1 TABLET | Refills: 0 | Status: SHIPPED | OUTPATIENT
Start: 2019-08-27 | End: 2019-08-28

## 2019-08-27 RX ORDER — CEFTRIAXONE 1 G/1
1 INJECTION, POWDER, FOR SOLUTION INTRAMUSCULAR; INTRAVENOUS
Status: COMPLETED | OUTPATIENT
Start: 2019-08-27 | End: 2019-08-27

## 2019-08-27 RX ORDER — FUROSEMIDE 20 MG/1
20 TABLET ORAL DAILY
Qty: 90 TABLET | Refills: 1 | Status: SHIPPED | OUTPATIENT
Start: 2019-08-27 | End: 2020-05-04 | Stop reason: SDUPTHER

## 2019-08-27 RX ORDER — DULOXETIN HYDROCHLORIDE 60 MG/1
120 CAPSULE, DELAYED RELEASE ORAL DAILY
Qty: 60 CAPSULE | Refills: 5 | Status: SHIPPED | OUTPATIENT
Start: 2019-08-27 | End: 2020-05-04 | Stop reason: SDUPTHER

## 2019-08-27 RX ORDER — FOLIC ACID 1 MG/1
1 TABLET ORAL DAILY
Qty: 90 TABLET | Refills: 3 | Status: SHIPPED | OUTPATIENT
Start: 2019-08-27 | End: 2023-08-04

## 2019-08-27 RX ADMIN — CEFTRIAXONE 1 G: 1 INJECTION, POWDER, FOR SOLUTION INTRAMUSCULAR; INTRAVENOUS at 03:08

## 2019-08-27 NOTE — PROGRESS NOTES
"Subjective:       Patient ID: Jose Berman is a 38 y.o. female.    Chief Complaint: Establish Care  New patient with significant PMH including liver transplant at [facility]  2014. Items on her diagnosis list MI, renal failure, CVA all associated during the time of her transplant when she developed complications, coded resulting in a 6 mo hospitalization.  She c/o residual SOB h/o tracheostomy with no recent CXR or PFT.     She was last seen by the transplant team 7/2018 with note viewed " HPI: Jose is now 32 months post liver transplant.  After she was discharged home from transplant she was hospitalized x 2 for narcotic overdose and turned down an offer of rehab. We were told that she received narcotics from 5-6 MDs. She also was drinking excessively as evidenced by an elevated peth test."    She reports recent separation from her partner and was diagnosed last week with Gonorrhea  yet continues to c/o vaginal odor, itching and very painful sex. She is due for a pap and asks about starting hormones, will refer to Gyn.         Past Medical History:   Diagnosis Date    Alcoholic liver failure 2014    Underwent transplant     Anxiety 1999    Bipolar 1 disorder, depressed 2004    CMV (cytomegalovirus)     Donor CMV Status: positive    ETOH abuse 08/27/2019    admits to social drinking    Heart attack 2015    s/p transplant complication and extended hospitalization     Hepatic steatosis 2014    Hepatitis B     Donor HBcAb: positive- taking lamivudine    Hypertension 2009    Kidney failure 2015    s/p transplant complication and extended hospitalization     Lumbago 1999    Macrocytic anemia 2014    Narcotic overdose 04/2018    hospitalized x 2- See 4/30/2018 office note    Osteoporosis 08/2016    Rheumatoid arthritis 5/1/2017    Skin cancer 1999, 2002, 2016    Stroke 2015    s/p transplant complication and extended hospitalization        Past Surgical History:   Procedure Laterality Date    BACK " SURGERY  2007    L4-L5 - Laminectomy    BELT ABDOMINOPLASTY  2013    BLADDER SUSPENSION  2004    BREAST SURGERY  2014    Breast Augmentation    CHOLECYSTECTOMY  2009    COLONOSCOPY N/A 7/8/2015    Performed by Eyad Garay MD at [facility] ENDO (2ND FLR)    DILATION AND CURETTAGE OF UTERUS      3 times     GASTRIC BYPASS  2009, 2010    HYSTERECTOMY  2008    INSERTION-PERM-A-CATH Right 9/25/2015    Performed by Joshua Goldberg, MD at [facility] OR 2ND FLR    LIVER TRANSPLANT  08/26/2015    Complication of liver transplant: dilated ducts suggestive of obstruction    PERMCATH REMOVAL-TUNNELED CVC REMOVAL Right 10/22/2015    Performed by Alexandra Kolb MD at [facility] CATH LAB    SKIN GRAFT  2013, 2012    TRANSPLANT-LIVER N/A 8/26/2015    Performed by Dewayne Spann MD at [facility] OR 2ND FLR        Social History     Socioeconomic History    Marital status:      Spouse name: Not on file    Number of children: 3    Years of education: Not on file    Highest education level: Bachelor's degree (e.g., BA, AB, BS)   Occupational History    Occupation: disbility -transplant    Social Needs    Financial resource strain: Not on file    Food insecurity:     Worry: Not on file     Inability: Not on file    Transportation needs:     Medical: Not on file     Non-medical: Not on file   Tobacco Use    Smoking status: Current Every Day Smoker     Packs/day: 0.50     Years: 2.00     Pack years: 1.00   Substance and Sexual Activity    Alcohol use: Yes     Comment: Rarely - once monthly    Drug use: Not Currently     Types: Marijuana, Amphetamines    Sexual activity: Yes     Partners: Male   Lifestyle    Physical activity:     Days per week: Not on file     Minutes per session: Not on file    Stress: Not on file   Relationships    Social connections:     Talks on phone: Not on file     Gets together: Not on file     Attends [employer] service: Not on file     Active member of club or organization: Not on file      Attends meetings of clubs or organizations: Not on file     Relationship status: Not on file   Other Topics Concern    Not on file   Social History Narrative    Not on file       Family History   Problem Relation Age of Onset    Alcohol abuse Father     Depression Father     Arthritis Father     Colon cancer Father 70    Depression Mother     Arthritis Mother        Review of patient's allergies indicates:   Allergen Reactions    Penicillins Hives and Shortness Of Breath          Current Outpatient Medications:     albuterol (PROAIR HFA) 90 mcg/actuation inhaler, Inhale 2 puffs into the lungs every 6 (six) hours as needed for Wheezing., Disp: 18 g, Rfl: 5    aspirin 81 MG Chew, Take 1 tablet (81 mg total) by mouth once daily., Disp: 90 tablet, Rfl: 3    calcium citrate/vitamin D3 (CITRACAL REGULAR ORAL), Take 1 tablet by mouth., Disp: , Rfl:     DULoxetine (CYMBALTA) 60 MG capsule, Take 2 capsules (120 mg total) by mouth once daily., Disp: 60 capsule, Rfl: 5    fludrocortisone (FLORINEF) 0.1 mg Tab, Take 1 tablet (100 mcg total) by mouth every other day., Disp: 15 tablet, Rfl: 5    fluticasone propionate (FLONASE) 50 mcg/actuation nasal spray, 1 spray (50 mcg total) by Each Nostril route 2 (two) times daily., Disp: 1 Bottle, Rfl: 5    folic acid (FOLVITE) 1 MG tablet, Take 1 tablet (1 mg total) by mouth once daily., Disp: 90 tablet, Rfl: 3    furosemide (LASIX) 20 MG tablet, Take 1 tablet (20 mg total) by mouth once daily., Disp: 90 tablet, Rfl: 1    lamiVUDine (EPIVIR) 150 MG Tab, Take 1 tablet (150 mg total) by mouth once daily., Disp: 30 tablet, Rfl: 5    multivitamin (THERAGRAN) tablet, Take 1 tablet by mouth once daily., Disp: 90 tablet, Rfl: 3    pantoprazole (PROTONIX) 40 MG tablet, Take 1 tablet (40 mg total) by mouth once daily., Disp: 30 tablet, Rfl: 5    promethazine (PHENERGAN) 12.5 MG Tab, Take 1 tablet (12.5 mg total) by mouth every 6 (six) hours as needed., Disp: 30 tablet, Rfl:  0    SEROQUEL  mg Tb24, Take 150 mg by mouth every evening. , Disp: , Rfl:     tacrolimus (PROGRAF) 1 MG Cap, Take 4 capsules (4 mg total) by mouth every 12 (twelve) hours., Disp: 240 capsule, Rfl: 5    thiamine 100 MG tablet, Take 100 mg by mouth once daily., Disp: , Rfl:     trazodone (DESYREL) 100 MG tablet, Take 100 mg by mouth nightly as needed for Insomnia., Disp: , Rfl:     ursodiol (ACTIGALL) 500 MG tablet, Take 1 tablet (500 mg total) by mouth 2 (two) times daily., Disp: 60 tablet, Rfl: 5    VITAMIN B-1 50 MG tablet, Take 100 mg by mouth once daily. , Disp: , Rfl:     fluconazole (DIFLUCAN) 150 MG Tab, Take 1 tablet (150 mg total) by mouth once daily. for 1 day, Disp: 1 tablet, Rfl: 0  No current facility-administered medications for this visit.     HPI  Review of Systems   Constitutional: Positive for fatigue.   HENT: Negative.    Eyes: Negative.    Respiratory: Positive for shortness of breath.    Cardiovascular: Negative.    Gastrointestinal: Negative.    Endocrine: Negative.    Genitourinary: Positive for vaginal pain.   Musculoskeletal: Negative.    Skin: Negative.    Allergic/Immunologic: Negative.    Neurological: Negative.    Hematological: Negative.    Psychiatric/Behavioral: Negative.        Objective:      Physical Exam   Constitutional: She is oriented to person, place, and time. She appears well-developed and well-nourished.   HENT:   Head: Normocephalic and atraumatic.   Mouth/Throat: Oropharynx is clear and moist.   Eyes: Pupils are equal, round, and reactive to light. Conjunctivae are normal. No scleral icterus.   Neck: Normal range of motion. Neck supple. No thyromegaly present.   Cardiovascular: Normal rate and regular rhythm.   No murmur heard.  Pulmonary/Chest: Effort normal. No respiratory distress.   Abdominal: Soft. Bowel sounds are normal. She exhibits no distension. There is no tenderness.   Musculoskeletal: Normal range of motion. She exhibits no edema.   Neurological:  She is alert and oriented to person, place, and time. No sensory deficit. She exhibits normal muscle tone.   Skin: Skin is warm. Capillary refill takes less than 2 seconds. No rash noted.   Psychiatric: She has a normal mood and affect. Her behavior is normal. Judgment and thought content normal.   Nursing note and vitals reviewed.      Assessment:       1. Encounter to establish care    2. S/P liver transplant 8/26 for ETOH cirrhosis    3. Seasonal allergies    4. Depression, unspecified depression type    5. Lipid screening    6. High risk medication use    7. Localized osteoporosis without current pathological fracture    8. Breast cancer screening    9. SOB (shortness of breath)    10. Gonorrhea    11. Vaginal yeast infection    12. Urinary tract infection without hematuria, site unspecified    13. Vaginal pain    14. Sexual pain disorder    15. Gynecological disorder        Plan:     1- fasting labs on another day  2- mammogram, DEXA, PFT and CXR ordered   3- rocephin today x 1  4- pt to self schedule with transplant team   5- pt to self schedule with Dermatology  6- RTC 4 weeks to discuss results  7- referred to Gyn  Gamal Hurtado    Encounter to establish care    S/P liver transplant 8/26 for ETOH cirrhosis  -     folic acid (FOLVITE) 1 MG tablet; Take 1 tablet (1 mg total) by mouth once daily.  Dispense: 90 tablet; Refill: 3  -     furosemide (LASIX) 20 MG tablet; Take 1 tablet (20 mg total) by mouth once daily.  Dispense: 90 tablet; Refill: 1  -     Lipid panel; Future; Expected date: 08/27/2019  -     Comprehensive metabolic panel; Future; Expected date: 08/27/2019  -     CBC auto differential; Future; Expected date: 08/27/2019  -     Vitamin D; Future; Expected date: 08/27/2019  -     TSH; Future; Expected date: 08/27/2019  -     T4, free; Future; Expected date: 08/27/2019  -     Cancel: Ambulatory referral to Gynecology    Seasonal allergies  -     fluticasone propionate (FLONASE) 50 mcg/actuation  nasal spray; 1 spray (50 mcg total) by Each Nostril route 2 (two) times daily.  Dispense: 1 Bottle; Refill: 5  -     albuterol (PROAIR HFA) 90 mcg/actuation inhaler; Inhale 2 puffs into the lungs every 6 (six) hours as needed for Wheezing.  Dispense: 18 g; Refill: 5    Depression, unspecified depression type  -     DULoxetine (CYMBALTA) 60 MG capsule; Take 2 capsules (120 mg total) by mouth once daily.  Dispense: 60 capsule; Refill: 5    Lipid screening  -     Lipid panel; Future; Expected date: 08/27/2019  -     Comprehensive metabolic panel; Future; Expected date: 08/27/2019  -     CBC auto differential; Future; Expected date: 08/27/2019  -     Vitamin D; Future; Expected date: 08/27/2019  -     TSH; Future; Expected date: 08/27/2019  -     T4, free; Future; Expected date: 08/27/2019    High risk medication use  -     Lipid panel; Future; Expected date: 08/27/2019  -     Comprehensive metabolic panel; Future; Expected date: 08/27/2019  -     CBC auto differential; Future; Expected date: 08/27/2019  -     Vitamin D; Future; Expected date: 08/27/2019  -     TSH; Future; Expected date: 08/27/2019  -     T4, free; Future; Expected date: 08/27/2019    Localized osteoporosis without current pathological fracture  -     DXA Bone Density Spine And Hip; Future; Expected date: 08/27/2019    Breast cancer screening  -     Mammo Digital Screening Bilat w/ Rafael; Future; Expected date: 08/27/2019    SOB (shortness of breath)  -     Complete PFT with bronchodilator; Future  -     PULSE OXIMETRY WITH REST - PULM; Future  -     X-Ray Chest PA And Lateral; Future; Expected date: 08/27/2019    Gonorrhea  -     cefTRIAXone injection 1 g    Vaginal yeast infection  -     fluconazole (DIFLUCAN) 150 MG Tab; Take 1 tablet (150 mg total) by mouth once daily. for 1 day  Dispense: 1 tablet; Refill: 0  -     Ambulatory referral to Gynecology    Urinary tract infection without hematuria, site unspecified  -     Urine culture  -     Ambulatory  referral to Gynecology    Vaginal pain  -     Cancel: Ambulatory referral to Gynecology  -     Ambulatory referral to Gynecology    Sexual pain disorder  -     Cancel: Ambulatory referral to Gynecology  -     Ambulatory referral to Gynecology    Gynecological disorder  -     Ambulatory referral to Gynecology    Other orders  -     fludrocortisone (FLORINEF) 0.1 mg Tab; Take 1 tablet (100 mcg total) by mouth every other day.  Dispense: 15 tablet; Refill: 5        Risks, benefits, and side effects were discussed with the patient. All questions were answered to the fullest satisfaction of the patient, and pt verbalized understanding and agreement to treatment plan. Pt was to call with any new or worsening symptoms, or present to the ER.

## 2019-08-27 NOTE — PATIENT INSTRUCTIONS
1- fasting labs on another day  2- mammogram, DEXA, PFT and CXR ordered   3- rocephin today x 1  4- pt to self schedule with transplant team   5- pt to self schedule with Dermatology  6- RTC 4 weeks to discuss results

## 2019-08-28 LAB — BACTERIA UR CULT: NORMAL

## 2019-08-30 ENCOUNTER — TELEPHONE (OUTPATIENT)
Dept: FAMILY MEDICINE | Facility: CLINIC | Age: 39
End: 2019-08-30

## 2019-08-30 DIAGNOSIS — Z94.4 S/P LIVER TRANSPLANT: Primary | ICD-10-CM

## 2019-08-30 NOTE — TELEPHONE ENCOUNTER
I spoke to the lab they said that since I can not link the other test with our that you can put them and the patient would need to tell them that she need to have them sent to the transplant team too.

## 2019-08-30 NOTE — TELEPHONE ENCOUNTER
Nuno Wynn Ms Cullen presented to me to establish care at [facility]. She is agreeable to labs. I asked my staff to link your lab orders to mine but the lab said they can not.    Can you please enter any blood work you wound like and they can all be drawn at the same time.    Thank you, Kim HUTCHINS

## 2019-08-30 NOTE — TELEPHONE ENCOUNTER
Please notify patient I have contacted Dr. Wynn asking her to enter transplant labs. When the patient has the blood work, it is her responsibility to let them know she has orders from two different [facility] providers so they draw all at one time. ty     She needs to be fasting

## 2019-09-06 ENCOUNTER — PATIENT MESSAGE (OUTPATIENT)
Dept: FAMILY MEDICINE | Facility: CLINIC | Age: 39
End: 2019-09-06

## 2019-09-06 ENCOUNTER — TELEPHONE (OUTPATIENT)
Dept: FAMILY MEDICINE | Facility: CLINIC | Age: 39
End: 2019-09-06

## 2019-09-06 NOTE — TELEPHONE ENCOUNTER
Please notify patient fasting lab orders have been entered by myself and the transplant team. Please offer to schedule and DO link duplicated labs as it is not necessary. ty

## 2019-09-10 ENCOUNTER — PATIENT OUTREACH (OUTPATIENT)
Dept: ADMINISTRATIVE | Facility: HOSPITAL | Age: 39
End: 2019-09-10

## 2019-09-24 ENCOUNTER — DOCUMENTATION ONLY (OUTPATIENT)
Dept: FAMILY MEDICINE | Facility: CLINIC | Age: 39
End: 2019-09-24

## 2019-09-24 RX ORDER — LAMIVUDINE 150 MG/1
150 TABLET, FILM COATED ORAL DAILY
Qty: 30 TABLET | Refills: 5 | Status: CANCELLED | OUTPATIENT
Start: 2019-09-24

## 2019-09-24 RX ORDER — PANTOPRAZOLE SODIUM 40 MG/1
40 TABLET, DELAYED RELEASE ORAL DAILY
Qty: 30 TABLET | Refills: 5 | Status: CANCELLED | OUTPATIENT
Start: 2019-09-24

## 2019-09-24 RX ORDER — URSODIOL 500 MG/1
500 TABLET, FILM COATED ORAL 2 TIMES DAILY
Qty: 60 TABLET | Refills: 5 | Status: CANCELLED | OUTPATIENT
Start: 2019-09-24

## 2019-09-24 RX ORDER — TACROLIMUS 1 MG/1
4 CAPSULE ORAL EVERY 12 HOURS
Qty: 240 CAPSULE | Refills: 5 | Status: CANCELLED | OUTPATIENT
Start: 2019-09-24

## 2019-09-24 NOTE — TELEPHONE ENCOUNTER
Please notify patient all medication request denied. Fasting labs were ordered by me and the transplant team. All transplant medication needs to be refilled by them. She needs blood work and follow up at [facility].

## 2019-09-24 NOTE — TELEPHONE ENCOUNTER
Medication refill request received and forwarded to provider for approval.  Patient missed visit today.

## 2019-09-25 NOTE — TELEPHONE ENCOUNTER
Called and LM for patient to return my call regarding medication denial.  She does have a lab appointment scheduled for Oct 5 2019.

## 2019-10-15 DIAGNOSIS — F32.A DEPRESSION, UNSPECIFIED DEPRESSION TYPE: ICD-10-CM

## 2019-10-15 RX ORDER — PANTOPRAZOLE SODIUM 40 MG/1
40 TABLET, DELAYED RELEASE ORAL DAILY
Qty: 30 TABLET | Refills: 5 | Status: CANCELLED | OUTPATIENT
Start: 2019-10-15

## 2019-10-15 RX ORDER — TACROLIMUS 1 MG/1
4 CAPSULE ORAL EVERY 12 HOURS
Qty: 240 CAPSULE | Refills: 5 | Status: CANCELLED | OUTPATIENT
Start: 2019-10-15

## 2019-10-15 RX ORDER — DULOXETIN HYDROCHLORIDE 60 MG/1
120 CAPSULE, DELAYED RELEASE ORAL DAILY
Qty: 60 CAPSULE | Refills: 5 | Status: CANCELLED | OUTPATIENT
Start: 2019-10-15

## 2019-10-15 RX ORDER — LAMIVUDINE 150 MG/1
150 TABLET, FILM COATED ORAL DAILY
Qty: 30 TABLET | Refills: 5 | Status: CANCELLED | OUTPATIENT
Start: 2019-10-15

## 2019-10-15 RX ORDER — URSODIOL 500 MG/1
500 TABLET, FILM COATED ORAL 2 TIMES DAILY
Qty: 60 TABLET | Refills: 5 | Status: CANCELLED | OUTPATIENT
Start: 2019-10-15

## 2019-10-15 NOTE — TELEPHONE ENCOUNTER
Please call pt and do not send message via PP. She needs her transplant labs and the fasting labs I have ordered prior to me providing refills.    All transplant medication must come from Transplant team. niya Alvarez

## 2019-10-15 NOTE — TELEPHONE ENCOUNTER
----- Message from Izabel Jose sent at 10/15/2019 12:39 PM CDT -----  Contact: Patient  Type:  RX Refill Request    Who Called: Patient  Refill or New Rx:  Refill  RX Name and Strength: tacrolimus (PROGRAF) 1 MG Cap, lamiVUDine (EPIVIR) 150 MG Tab, pantoprazole (PROTONIX) 40 MG tablet, DULoxetine (CYMBALTA) 60 MG capsule and ursodiol (ACTIGALL) 500 MG tablet   How is the patient currently taking it? (ex. 1XDay):  na  Is this a 30 day or 90 day RX:  na  Preferred Pharmacy with phone number:    [facility] - [place], [place] - [address]  [address]  [place] [address]  Phone: [phone] Fax: [phone]    Local or Mail Order:  Local  Ordering Provider:  Halie Centeno NP  Best Call Back Number:  [phone]  Additional Information: Calling to request a refill

## 2019-10-23 ENCOUNTER — TELEPHONE (OUTPATIENT)
Dept: TRANSPLANT | Facility: CLINIC | Age: 39
End: 2019-10-23

## 2020-01-20 ENCOUNTER — TELEPHONE (OUTPATIENT)
Dept: TRANSPLANT | Facility: CLINIC | Age: 40
End: 2020-01-20

## 2020-01-20 NOTE — TELEPHONE ENCOUNTER
Received call from patient who would like to schedule labs and get reestablished for post transplant care after being lost to follow. Pt states she will do labs tomorrow morning at [facility], once completed with send Dr. Wynn refills of transplant meds. Advised pt to call PCP office to arrange additional testing she missed. She verbalizes understanding.

## 2020-02-20 ENCOUNTER — PATIENT OUTREACH (OUTPATIENT)
Dept: ADMINISTRATIVE | Facility: HOSPITAL | Age: 40
End: 2020-02-20

## 2020-02-20 NOTE — LETTER
February 28, 2020    Jose Berman  [address] [place] [address]             [facility]  [address] [facility] [address]  Phone: [phone] [facility] is committed to your overall health and would like to ensure that you are up to date on your recommended test and/or procedures.   Halie Villagran NP  has found that your chart shows you may be due for the following:     TETANUS VACCINE due on 09/16/1996   Pneumococcal Vaccine (Highest Risk)(2 of 3 - PPSV23) due on 09/04/2015   Mammogram due on 09/16/2018   Influenza Vaccine(1) due on 09/01/2019       If you have had any of the above done at another facility, please let us know so that we may obtain copies from that facility.  If you have a copy of these records, please provide a copy for us to scan into your chart.  You are welcome to request that the report be faxed to us at  ([phone]).     Otherwise, please schedule these appointments at your earliest convenience by calling [phone] or going to [email or web address].     If you have an upcoming scheduled appointment for the item above, please disregard this letter.     Sincerely,   Your [facility] Team   LISET Tamayo L.P.N. Clinical Care Coordinator   [address] [place] [address], [place] [address] [phone]

## 2020-02-27 ENCOUNTER — PATIENT MESSAGE (OUTPATIENT)
Dept: FAMILY MEDICINE | Facility: CLINIC | Age: 40
End: 2020-02-27

## 2020-02-28 NOTE — PROGRESS NOTES
"Attempted to outreach patient for pre-visit via "[employer]", no answer after a week. Sending outreach via Mail Out Letter now.    "

## 2020-04-30 ENCOUNTER — TELEPHONE (OUTPATIENT)
Dept: TRANSPLANT | Facility: CLINIC | Age: 40
End: 2020-04-30

## 2020-04-30 ENCOUNTER — LAB VISIT (OUTPATIENT)
Dept: LAB | Facility: HOSPITAL | Age: 40
End: 2020-04-30
Attending: INTERNAL MEDICINE
Payer: MEDICAID

## 2020-04-30 DIAGNOSIS — Z94.4 S/P LIVER TRANSPLANT: ICD-10-CM

## 2020-04-30 DIAGNOSIS — Z94.4 S/P LIVER TRANSPLANT: Primary | ICD-10-CM

## 2020-04-30 LAB
ALBUMIN SERPL BCP-MCNC: 3.8 G/DL (ref 3.5–5.2)
ALP SERPL-CCNC: 224 U/L (ref 55–135)
ALT SERPL W/O P-5'-P-CCNC: 93 U/L (ref 10–44)
ANION GAP SERPL CALC-SCNC: 8 MMOL/L (ref 8–16)
AST SERPL-CCNC: 102 U/L (ref 10–40)
BILIRUB SERPL-MCNC: 0.8 MG/DL (ref 0.1–1)
BUN SERPL-MCNC: 16 MG/DL (ref 6–20)
CALCIUM SERPL-MCNC: 8.4 MG/DL (ref 8.7–10.5)
CHLORIDE SERPL-SCNC: 104 MMOL/L (ref 95–110)
CO2 SERPL-SCNC: 25 MMOL/L (ref 23–29)
CREAT SERPL-MCNC: 1 MG/DL (ref 0.5–1.4)
EST. GFR  (AFRICAN AMERICAN): >60 ML/MIN/1.73 M^2
EST. GFR  (NON AFRICAN AMERICAN): >60 ML/MIN/1.73 M^2
ETHANOL SERPL-MCNC: <5 MG/DL
GLUCOSE SERPL-MCNC: 119 MG/DL (ref 70–110)
POTASSIUM SERPL-SCNC: 3.8 MMOL/L (ref 3.5–5.1)
PROT SERPL-MCNC: 7.8 G/DL (ref 6–8.4)
SODIUM SERPL-SCNC: 137 MMOL/L (ref 136–145)

## 2020-04-30 PROCEDURE — 80320 DRUG SCREEN QUANTALCOHOLS: CPT

## 2020-04-30 PROCEDURE — 80307 DRUG TEST PRSMV CHEM ANLYZR: CPT

## 2020-04-30 PROCEDURE — 80321 ALCOHOLS BIOMARKERS 1OR 2: CPT

## 2020-04-30 PROCEDURE — 80053 COMPREHEN METABOLIC PANEL: CPT

## 2020-04-30 PROCEDURE — 80197 ASSAY OF TACROLIMUS: CPT

## 2020-04-30 PROCEDURE — 36415 COLL VENOUS BLD VENIPUNCTURE: CPT

## 2020-04-30 NOTE — TELEPHONE ENCOUNTER
Pt states she is on the way to the lab and would like to have labs done, informed pt it is 1:30 pm and she needs to have labs done in the morning prior to taking am meds. She states she purposely took meds at 1:00 am last night in preparation of knowing she would have a ride this afternoon. Will schedule labs and await results. Pt has been lost to follow due to non compliance and states she has 8 pills left of Tacrolimus. Informed pt would review with hepatologist once labs completed on refill. She verbalizes understanding.

## 2020-04-30 NOTE — TELEPHONE ENCOUNTER
----- Message from Tatyana Escobar sent at 4/30/2020  1:16 PM CDT -----  Contact: pt  Reason: Pt calling to ask if orders was placed she is on her way to labs now?    Communication: [phone]

## 2020-05-01 ENCOUNTER — PATIENT MESSAGE (OUTPATIENT)
Dept: FAMILY MEDICINE | Facility: CLINIC | Age: 40
End: 2020-05-01

## 2020-05-01 DIAGNOSIS — Z94.4 S/P LIVER TRANSPLANT: ICD-10-CM

## 2020-05-01 DIAGNOSIS — J30.2 SEASONAL ALLERGIES: ICD-10-CM

## 2020-05-01 DIAGNOSIS — F32.A DEPRESSION, UNSPECIFIED DEPRESSION TYPE: ICD-10-CM

## 2020-05-01 LAB — TACROLIMUS BLD-MCNC: 2.8 NG/ML (ref 5–15)

## 2020-05-01 RX ORDER — FLUTICASONE PROPIONATE 50 MCG
1 SPRAY, SUSPENSION (ML) NASAL 2 TIMES DAILY
Status: CANCELLED | OUTPATIENT
Start: 2020-05-01

## 2020-05-01 RX ORDER — PROMETHAZINE HYDROCHLORIDE 12.5 MG/1
12.5 TABLET ORAL EVERY 6 HOURS PRN
Qty: 30 TABLET | Refills: 0 | Status: CANCELLED | OUTPATIENT
Start: 2020-05-01

## 2020-05-01 RX ORDER — NAPROXEN SODIUM 220 MG/1
81 TABLET, FILM COATED ORAL DAILY
Qty: 90 TABLET | Refills: 3 | Status: SHIPPED | OUTPATIENT
Start: 2020-05-01 | End: 2020-05-05 | Stop reason: SDUPTHER

## 2020-05-01 RX ORDER — TACROLIMUS 1 MG/1
4 CAPSULE ORAL EVERY 12 HOURS
Qty: 240 CAPSULE | Refills: 5 | Status: SHIPPED | OUTPATIENT
Start: 2020-05-01 | End: 2020-05-04

## 2020-05-01 RX ORDER — ALBUTEROL SULFATE 90 UG/1
2 AEROSOL, METERED RESPIRATORY (INHALATION) EVERY 6 HOURS PRN
Qty: 18 G | Refills: 5 | Status: CANCELLED | OUTPATIENT
Start: 2020-05-01

## 2020-05-01 RX ORDER — FLUDROCORTISONE ACETATE 0.1 MG/1
100 TABLET ORAL EVERY OTHER DAY
Qty: 15 TABLET | Refills: 5 | Status: CANCELLED | OUTPATIENT
Start: 2020-05-01

## 2020-05-01 RX ORDER — FOLIC ACID 1 MG/1
1 TABLET ORAL DAILY
Qty: 90 TABLET | Refills: 3 | Status: CANCELLED | OUTPATIENT
Start: 2020-05-01

## 2020-05-01 RX ORDER — LAMIVUDINE 150 MG/1
150 TABLET, FILM COATED ORAL DAILY
Qty: 30 TABLET | Refills: 5 | Status: SHIPPED | OUTPATIENT
Start: 2020-05-01 | End: 2020-05-04

## 2020-05-01 RX ORDER — TACROLIMUS 1 MG/1
4 CAPSULE ORAL EVERY 12 HOURS
Qty: 240 CAPSULE | Refills: 5 | Status: SHIPPED | OUTPATIENT
Start: 2020-05-01 | End: 2020-05-05 | Stop reason: SDUPTHER

## 2020-05-01 RX ORDER — DULOXETIN HYDROCHLORIDE 60 MG/1
120 CAPSULE, DELAYED RELEASE ORAL DAILY
Qty: 60 CAPSULE | Refills: 5 | Status: CANCELLED | OUTPATIENT
Start: 2020-05-01

## 2020-05-01 RX ORDER — URSODIOL 500 MG/1
500 TABLET, FILM COATED ORAL 2 TIMES DAILY
Qty: 60 TABLET | Refills: 5 | Status: SHIPPED | OUTPATIENT
Start: 2020-05-01 | End: 2020-05-04

## 2020-05-01 RX ORDER — URSODIOL 500 MG/1
500 TABLET, FILM COATED ORAL 2 TIMES DAILY
Qty: 60 TABLET | Refills: 5 | Status: SHIPPED | OUTPATIENT
Start: 2020-05-01 | End: 2020-05-05 | Stop reason: SDUPTHER

## 2020-05-01 RX ORDER — LAMIVUDINE 150 MG/1
150 TABLET, FILM COATED ORAL DAILY
Qty: 30 TABLET | Refills: 5 | Status: SHIPPED | OUTPATIENT
Start: 2020-05-01 | End: 2020-05-05 | Stop reason: SDUPTHER

## 2020-05-01 RX ORDER — FUROSEMIDE 20 MG/1
20 TABLET ORAL DAILY
Qty: 90 TABLET | Refills: 1 | Status: CANCELLED | OUTPATIENT
Start: 2020-05-01

## 2020-05-01 RX ORDER — PANTOPRAZOLE SODIUM 40 MG/1
40 TABLET, DELAYED RELEASE ORAL DAILY
Qty: 30 TABLET | Refills: 5 | Status: SHIPPED | OUTPATIENT
Start: 2020-05-01 | End: 2020-05-04 | Stop reason: SDUPTHER

## 2020-05-01 RX ORDER — DEXTROAMPHETAMINE SACCHARATE, AMPHETAMINE ASPARTATE MONOHYDRATE, DEXTROAMPHETAMINE SULFATE AND AMPHETAMINE SULFATE 5; 5; 5; 5 MG/1; MG/1; MG/1; MG/1
CAPSULE, EXTENDED RELEASE ORAL
COMMUNITY
Start: 2019-09-25 | End: 2023-10-18

## 2020-05-04 ENCOUNTER — OFFICE VISIT (OUTPATIENT)
Dept: FAMILY MEDICINE | Facility: CLINIC | Age: 40
End: 2020-05-04
Payer: MEDICAID

## 2020-05-04 DIAGNOSIS — J30.2 SEASONAL ALLERGIES: ICD-10-CM

## 2020-05-04 DIAGNOSIS — F33.41 RECURRENT MAJOR DEPRESSIVE DISORDER, IN PARTIAL REMISSION: ICD-10-CM

## 2020-05-04 DIAGNOSIS — Z94.4 S/P LIVER TRANSPLANT: Primary | ICD-10-CM

## 2020-05-04 LAB
AMPHETAMINES SERPL QL: NEGATIVE
BARBITURATES SERPL QL SCN: NEGATIVE
BENZODIAZ SERPL QL SCN: NEGATIVE
BZE SERPL QL: NEGATIVE
CARBOXYTHC SERPL QL SCN: NEGATIVE
ETHANOL SERPL QL SCN: NEGATIVE
METHADONE SERPL QL SCN: NEGATIVE
OPIATES SERPL QL SCN: NEGATIVE
PCP SERPL QL SCN: NEGATIVE
PROPOXYPH SERPL QL: NEGATIVE

## 2020-05-04 PROCEDURE — 99213 PR OFFICE/OUTPT VISIT, EST, LEVL III, 20-29 MIN: ICD-10-PCS | Mod: GT,,, | Performed by: NURSE PRACTITIONER

## 2020-05-04 PROCEDURE — 99213 OFFICE O/P EST LOW 20 MIN: CPT | Mod: GT,,, | Performed by: NURSE PRACTITIONER

## 2020-05-04 RX ORDER — FLUTICASONE PROPIONATE 50 MCG
1 SPRAY, SUSPENSION (ML) NASAL 2 TIMES DAILY
Qty: 16 G | Refills: 5 | Status: SHIPPED | OUTPATIENT
Start: 2020-05-04 | End: 2023-08-04

## 2020-05-04 RX ORDER — FUROSEMIDE 20 MG/1
20 TABLET ORAL EVERY MORNING
Qty: 90 TABLET | Refills: 3 | Status: SHIPPED | OUTPATIENT
Start: 2020-05-04 | End: 2021-03-04

## 2020-05-04 RX ORDER — ALBUTEROL SULFATE 90 UG/1
2 AEROSOL, METERED RESPIRATORY (INHALATION) EVERY 6 HOURS PRN
Qty: 18 G | Refills: 5 | Status: SHIPPED | OUTPATIENT
Start: 2020-05-04 | End: 2021-01-27 | Stop reason: SDUPTHER

## 2020-05-04 RX ORDER — DULOXETIN HYDROCHLORIDE 60 MG/1
120 CAPSULE, DELAYED RELEASE ORAL DAILY
Qty: 180 CAPSULE | Refills: 1 | Status: SHIPPED | OUTPATIENT
Start: 2020-05-04 | End: 2021-03-23

## 2020-05-04 RX ORDER — ONDANSETRON 4 MG/1
4 TABLET, ORALLY DISINTEGRATING ORAL EVERY 12 HOURS
Qty: 90 TABLET | Refills: 5 | Status: SHIPPED | OUTPATIENT
Start: 2020-05-04 | End: 2023-08-04

## 2020-05-04 RX ORDER — ONDANSETRON 4 MG/1
4 TABLET, ORALLY DISINTEGRATING ORAL
COMMUNITY
End: 2020-05-04 | Stop reason: SDUPTHER

## 2020-05-04 RX ORDER — PANTOPRAZOLE SODIUM 40 MG/1
40 TABLET, DELAYED RELEASE ORAL DAILY
Qty: 90 TABLET | Refills: 1 | Status: SHIPPED | OUTPATIENT
Start: 2020-05-04 | End: 2021-05-12

## 2020-05-04 NOTE — PROGRESS NOTES
Subjective:       Patient ID: Jose Berman is a 39 y.o. female.    Chief Complaint: Medication Refill (Request refills on all medication except transplant meds)    The patient location is: Home [place]  The chief complaint leading to consultation is: Medications refills  Visit type: audiovisual  Total time spent with patient: 21  Each patient to whom he or she provides medical services by telemedicine is:  (1) informed of the relationship between the physician and patient and the respective role of any other health care provider with respect to management of the patient; and (2) notified that he or she may decline to receive medical services by telemedicine and may withdraw from such care at any time.    Notes:   The patient is a 39-year-old female with a past medical history of liver transplant, she reports doing well.  She presents today requesting refills on all medication except for her transplant meds.  She complains of daily chronic nausea, shortness of breath, fatigue and edema since her transplant yet symptoms are consistent and the transplant team is aware.  She underwent cardiology workup 3 years ago for the same symptoms that was reported normal.  MAR corrected by NP    Past Medical History:   Diagnosis Date    Alcoholic liver failure 2014    Underwent transplant     Anxiety 1999    Bipolar 1 disorder, depressed 2004    Bipolar 1 disorder, depressed     Informed from pt via Patient Portal     CMV (cytomegalovirus)     Donor CMV Status: positive    ETOH abuse 08/27/2019    admits to social drinking    Heart attack 2015    s/p transplant complication and extended hospitalization     Hepatic steatosis 2014    Hepatitis B     Donor HBcAb: positive- taking lamivudine    Hypertension 2009    Kidney failure 2015    s/p transplant complication and extended hospitalization     Lumbago 1999    Macrocytic anemia 2014    Narcotic overdose 04/2018    hospitalized x 2- See 4/30/2018 office note     Osteoporosis 08/2016    Rheumatoid arthritis 5/1/2017    Skin cancer 1999, 2002, 2016    Stroke 2015    s/p transplant complication and extended hospitalization        Past Surgical History:   Procedure Laterality Date    BACK SURGERY  2007    L4-L5 - Laminectomy    BELT ABDOMINOPLASTY  2013    BLADDER SUSPENSION  2004    BREAST SURGERY  2014    Breast Augmentation    CHOLECYSTECTOMY  2009    DILATION AND CURETTAGE OF UTERUS      3 times     GASTRIC BYPASS  2009, 2010    HYSTERECTOMY  2008    LIVER TRANSPLANT  08/26/2015    Complication of liver transplant: dilated ducts suggestive of obstruction    SKIN GRAFT  2013, 2012        Social History     Socioeconomic History    Marital status:      Spouse name: Not on file    Number of children: 3    Years of education: Not on file    Highest education level: Bachelor's degree (e.g., BA, AB, BS)   Occupational History    Occupation: disbility -transplant    Social Needs    Financial resource strain: Not on file    Food insecurity:     Worry: Not on file     Inability: Not on file    Transportation needs:     Medical: Not on file     Non-medical: Not on file   Tobacco Use    Smoking status: Current Every Day Smoker     Packs/day: 0.50     Years: 2.00     Pack years: 1.00   Substance and Sexual Activity    Alcohol use: Yes     Comment: Rarely - once monthly    Drug use: Not Currently     Types: Marijuana, Amphetamines    Sexual activity: Yes     Partners: Male   Lifestyle    Physical activity:     Days per week: Not on file     Minutes per session: Not on file    Stress: Not on file   Relationships    Social connections:     Talks on phone: Not on file     Gets together: Not on file     Attends Worship service: Not on file     Active member of club or organization: Not on file     Attends meetings of clubs or organizations: Not on file     Relationship status: Not on file   Other Topics Concern    Not on file   Social History Narrative     Not on file       Family History   Problem Relation Age of Onset    Alcohol abuse Father     Depression Father     Arthritis Father     Colon cancer Father 70    Depression Mother     Arthritis Mother        Review of patient's allergies indicates:   Allergen Reactions    Penicillins Hives and Shortness Of Breath          Current Outpatient Medications:     dextroamphetamine-amphetamine (ADDERALL XR) 20 MG 24 hr capsule, , Disp: , Rfl:     ondansetron (ZOFRAN-ODT) 4 MG TbDL, Take 1 tablet (4 mg total) by mouth every 12 (twelve) hours., Disp: 90 tablet, Rfl: 5    albuterol (PROAIR HFA) 90 mcg/actuation inhaler, Inhale 2 puffs into the lungs every 6 (six) hours as needed for Wheezing., Disp: 18 g, Rfl: 5    aspirin 81 MG Chew, Take 1 tablet (81 mg total) by mouth once daily., Disp: 90 tablet, Rfl: 3    calcium citrate/vitamin D3 (CITRACAL REGULAR ORAL), Take 1 tablet by mouth., Disp: , Rfl:     DULoxetine (CYMBALTA) 60 MG capsule, Take 2 capsules (120 mg total) by mouth once daily., Disp: 180 capsule, Rfl: 1    fludrocortisone (FLORINEF) 0.1 mg Tab, Take 1 tablet (100 mcg total) by mouth every other day., Disp: 15 tablet, Rfl: 5    fluticasone propionate (FLONASE) 50 mcg/actuation nasal spray, 1 spray (50 mcg total) by Each Nostril route 2 (two) times daily., Disp: 16 g, Rfl: 5    folic acid (FOLVITE) 1 MG tablet, Take 1 tablet (1 mg total) by mouth once daily., Disp: 90 tablet, Rfl: 3    furosemide (LASIX) 20 MG tablet, Take 1 tablet (20 mg total) by mouth every morning., Disp: 90 tablet, Rfl: 3    lamiVUDine (EPIVIR) 150 MG Tab, Take 1 tablet (150 mg total) by mouth once daily., Disp: 30 tablet, Rfl: 5    multivitamin (THERAGRAN) tablet, Take 1 tablet by mouth once daily., Disp: 90 tablet, Rfl: 3    pantoprazole (PROTONIX) 40 MG tablet, Take 1 tablet (40 mg total) by mouth once daily., Disp: 90 tablet, Rfl: 1    tacrolimus (PROGRAF) 1 MG Cap, Take 4 capsules (4 mg total) by mouth every 12  (twelve) hours., Disp: 240 capsule, Rfl: 5    thiamine 100 MG tablet, Take 100 mg by mouth once daily., Disp: , Rfl:     ursodioL (ACTIGALL) 500 MG tablet, Take 1 tablet (500 mg total) by mouth 2 (two) times daily., Disp: 60 tablet, Rfl: 5    VITAMIN B-1 50 MG tablet, Take 100 mg by mouth once daily. , Disp: , Rfl:     HPI  Review of Systems   Constitutional: Positive for fatigue.   HENT: Negative.    Eyes: Negative.    Respiratory: Positive for shortness of breath.    Cardiovascular: Positive for leg swelling.   Gastrointestinal: Positive for nausea.   Endocrine: Negative.    Genitourinary: Negative.    Musculoskeletal: Negative.    Skin: Negative.    Allergic/Immunologic: Negative.    Neurological: Negative.    Hematological: Negative.    Psychiatric/Behavioral: Negative.        Objective:      Physical Exam   Constitutional: She is oriented to person, place, and time. She appears well-developed and well-nourished. No distress.   HENT:   Head: Normocephalic.   Neck: Normal range of motion.   Pulmonary/Chest: Effort normal.   Spoke in full sentences without sob   Musculoskeletal: Normal range of motion.   Neurological: She is alert and oriented to person, place, and time.   Skin: No rash noted. No pallor.   Psychiatric: She has a normal mood and affect. Her behavior is normal. Judgment and thought content normal.       Assessment:       1. S/P liver transplant 8/26 for ETOH cirrhosis    2. Seasonal allergies    3. Recurrent major depressive disorder, in partial remission        Plan:     1.  All medications refilled for 6 months  2.  Return to clinic as needed    S/P liver transplant 8/26 for ETOH cirrhosis  -     furosemide (LASIX) 20 MG tablet; Take 1 tablet (20 mg total) by mouth every morning.  Dispense: 90 tablet; Refill: 3  -     pantoprazole (PROTONIX) 40 MG tablet; Take 1 tablet (40 mg total) by mouth once daily.  Dispense: 90 tablet; Refill: 1  -     ondansetron (ZOFRAN-ODT) 4 MG TbDL; Take 1 tablet (4  mg total) by mouth every 12 (twelve) hours.  Dispense: 90 tablet; Refill: 5    Seasonal allergies  -     albuterol (PROAIR HFA) 90 mcg/actuation inhaler; Inhale 2 puffs into the lungs every 6 (six) hours as needed for Wheezing.  Dispense: 18 g; Refill: 5  -     fluticasone propionate (FLONASE) 50 mcg/actuation nasal spray; 1 spray (50 mcg total) by Each Nostril route 2 (two) times daily.  Dispense: 16 g; Refill: 5    Recurrent major depressive disorder, in partial remission  -     DULoxetine (CYMBALTA) 60 MG capsule; Take 2 capsules (120 mg total) by mouth once daily.  Dispense: 180 capsule; Refill: 1        Risks, benefits, and side effects were discussed with the patient. All questions were answered to the fullest satisfaction of the patient, and pt verbalized understanding and agreement to treatment plan. Pt was to call with any new or worsening symptoms, or present to the ER.

## 2020-05-06 NOTE — TELEPHONE ENCOUNTER
----- Message from Adalgisa Blue RN sent at 5/6/2020  1:20 PM CDT -----  Contact: PT       ----- Message -----  From: Vida Dozier  Sent: 5/6/2020  12:04 PM CDT  To: Singh Montalvo Staff    PT needs a PA for her prograf. The pharmacy said bc she needs to have 8 pills a day, a PA is needed. Said the pharmacy sent something over about it. Please call back     Callback: [phone]

## 2020-05-06 NOTE — TELEPHONE ENCOUNTER
PA completed for Tacrolimus with [facility] with [facility].

## 2020-05-07 LAB — PHOSPHATIDYLETHANOL (PETH): NEGATIVE NG/ML

## 2020-05-07 RX ORDER — TACROLIMUS 1 MG/1
4 CAPSULE ORAL EVERY 12 HOURS
Qty: 240 CAPSULE | Refills: 5 | Status: SHIPPED | OUTPATIENT
Start: 2020-05-07 | End: 2021-01-29 | Stop reason: SDUPTHER

## 2020-05-07 RX ORDER — LAMIVUDINE 150 MG/1
150 TABLET, FILM COATED ORAL DAILY
Qty: 30 TABLET | Refills: 5 | Status: SHIPPED | OUTPATIENT
Start: 2020-05-07 | End: 2021-01-29 | Stop reason: SDUPTHER

## 2020-05-07 RX ORDER — URSODIOL 500 MG/1
500 TABLET, FILM COATED ORAL 2 TIMES DAILY
Qty: 60 TABLET | Refills: 5 | Status: SHIPPED | OUTPATIENT
Start: 2020-05-07 | End: 2021-01-29 | Stop reason: SDUPTHER

## 2020-05-07 RX ORDER — NAPROXEN SODIUM 220 MG/1
81 TABLET, FILM COATED ORAL DAILY
Qty: 90 TABLET | Refills: 3 | Status: SHIPPED | OUTPATIENT
Start: 2020-05-07 | End: 2023-08-04 | Stop reason: SDUPTHER

## 2020-05-12 ENCOUNTER — TELEPHONE (OUTPATIENT)
Dept: TRANSPLANT | Facility: CLINIC | Age: 40
End: 2020-05-12

## 2020-05-12 DIAGNOSIS — Z94.4 S/P LIVER TRANSPLANT: Primary | ICD-10-CM

## 2020-05-12 NOTE — LETTER
May 12, 2020    Jose Berman  [address] [facility] [place] [address]          Dear Garciajesusita Berman:  MRN: [id number]    This is a follow up to your recent labs, your lab results were stable but elevated.  There are no medicine changes.  Please have your labs drawn again on 6/22/20.      If you cannot have your labs drawn on the scheduled date, it is your responsibility to call the transplant department to reschedule.  To reschedule or make an appointment, please as to speak to or leave a message for my assistant, Felisha Webber or Rohini, at [phone].  When leaving a message for Felisha Webber Angela or myself, we ask that you leave a brief message regarding your request.    Sincerely,    Marie Ramos, RN,BSN,CCTC    Your Liver Transplant Coordinator    [facility]  [address] [place] [address]  [phone]

## 2020-05-12 NOTE — TELEPHONE ENCOUNTER
----- Message from Gil Rice MD sent at 5/4/2020  9:47 AM CDT -----  Results reviewed. Please advise labs are stable.

## 2020-05-20 ENCOUNTER — PATIENT MESSAGE (OUTPATIENT)
Dept: ADMINISTRATIVE | Facility: HOSPITAL | Age: 40
End: 2020-05-20

## 2020-06-24 ENCOUNTER — TELEPHONE (OUTPATIENT)
Dept: TRANSPLANT | Facility: CLINIC | Age: 40
End: 2020-06-24

## 2020-06-24 NOTE — LETTER
June 24, 2020    Jose Berman  [address] [facility] [place] [address]          Dear Garciajesusita Berman:  MRN: [id number]    Your lab work was due to be drawn on 6/22/20.  We contacted your lab and were unable to get test results.  It is very important to get your labs drawn as scheduled.  We cannot monitor you for rejection, infections, or drug toxicity side effects without lab results.  Please call us at [phone] as soon as possible to let us know when you plan to have labs drawn.    Sincerely,    Marie Ramos, RN,BSN,CCTC    Your Liver Transplant Coordinator    [facility]  [address] [place] [address]  [phone]

## 2020-07-10 ENCOUNTER — TELEPHONE (OUTPATIENT)
Dept: FAMILY MEDICINE | Facility: CLINIC | Age: 40
End: 2020-07-10

## 2020-07-10 NOTE — TELEPHONE ENCOUNTER
----- Message from Madhav Hauser sent at 7/10/2020  1:55 PM CDT -----  Regarding: Pharm follow up  Contact: [facility]  Type:  Patient Returning Call    Who Called:  Shyam  Does the patient know what this is regarding?:  Please follow up with new script for pt to get med refill for folic acid (FOLVITE) 1 MG tablet and fludrocortisone (FLORINEF) 0.1 mg Tab  Best Call Back Number:    Additional Information:  Please follow up. Thank you

## 2020-07-21 ENCOUNTER — TELEPHONE (OUTPATIENT)
Dept: TRANSPLANT | Facility: CLINIC | Age: 40
End: 2020-07-21

## 2020-07-21 NOTE — TELEPHONE ENCOUNTER
No communication received from patient after missed lab letter. Will keep pt's status as not followed, unable to contact pt.

## 2020-10-05 ENCOUNTER — PATIENT MESSAGE (OUTPATIENT)
Dept: ADMINISTRATIVE | Facility: HOSPITAL | Age: 40
End: 2020-10-05

## 2020-10-29 DIAGNOSIS — Z12.31 OTHER SCREENING MAMMOGRAM: ICD-10-CM

## 2020-11-02 ENCOUNTER — PATIENT MESSAGE (OUTPATIENT)
Dept: FAMILY MEDICINE | Facility: CLINIC | Age: 40
End: 2020-11-02

## 2020-11-09 ENCOUNTER — TELEPHONE (OUTPATIENT)
Dept: ADMINISTRATIVE | Facility: HOSPITAL | Age: 40
End: 2020-11-09

## 2020-11-09 NOTE — LETTER
November 9, 2020    Jose Berman  [address] [facility] [place] [address]             [facility]  [address]  [facility] [address]  Phone: [phone] Dear Jose,     Your [facility] primary care team is dedicated to assisting you achieve your health goals.  In order to do so, scheduling your routine screenings and tests are key to your good health.  Our records indicate you may be overdue for your mammogram screening.  Mammography screening can help find breast cancer at an early stage, when it is most likely to be successfully treated.     Halie Villagran NP has entered your screening mammogram order.  We encourage you to schedule your appointment at any of our [facility] imaging locations.  To do this online, please visit [email or web address] or contact our office at [phone] and we will be more than happy to assist you in scheduling your mammogram.     YOU CAN SCHEDULE MAMMOGRAM APPOINTMENT THROUGH THIS MESSAGE       If you recently had your mammogram screening outside of [facility], please let your primary care team know so that they can update your health record.  Please send a message to your primary care physician via your [facility] account or contact his/her office.   Thank you for letting us care of your healthcare needs.       Sincerely,   Your [facility]   LISET Tamayo L.P.N. Clinical Care Coordinator   [address] [place] [address], [place] [address] [phone]

## 2021-01-04 ENCOUNTER — PATIENT MESSAGE (OUTPATIENT)
Dept: ADMINISTRATIVE | Facility: HOSPITAL | Age: 41
End: 2021-01-04

## 2021-01-27 DIAGNOSIS — J30.2 SEASONAL ALLERGIES: ICD-10-CM

## 2021-01-27 RX ORDER — LAMIVUDINE 150 MG/1
150 TABLET, FILM COATED ORAL DAILY
Qty: 30 TABLET | Refills: 5 | Status: CANCELLED | OUTPATIENT
Start: 2021-01-27

## 2021-01-27 RX ORDER — URSODIOL 500 MG/1
500 TABLET, FILM COATED ORAL 2 TIMES DAILY
Qty: 60 TABLET | Refills: 5 | Status: CANCELLED | OUTPATIENT
Start: 2021-01-27

## 2021-01-27 RX ORDER — TACROLIMUS 1 MG/1
4 CAPSULE ORAL EVERY 12 HOURS
Qty: 240 CAPSULE | Refills: 5 | Status: CANCELLED | OUTPATIENT
Start: 2021-01-27

## 2021-01-28 ENCOUNTER — PATIENT MESSAGE (OUTPATIENT)
Dept: FAMILY MEDICINE | Facility: CLINIC | Age: 41
End: 2021-01-28

## 2021-01-28 ENCOUNTER — PATIENT MESSAGE (OUTPATIENT)
Dept: TRANSPLANT | Facility: CLINIC | Age: 41
End: 2021-01-28

## 2021-01-28 DIAGNOSIS — Z94.4 S/P LIVER TRANSPLANT: ICD-10-CM

## 2021-01-28 RX ORDER — ALBUTEROL SULFATE 90 UG/1
2 AEROSOL, METERED RESPIRATORY (INHALATION) EVERY 6 HOURS PRN
Qty: 18 G | Refills: 5 | Status: SHIPPED | OUTPATIENT
Start: 2021-01-28 | End: 2023-10-18 | Stop reason: SDUPTHER

## 2021-01-29 ENCOUNTER — TELEPHONE (OUTPATIENT)
Dept: TRANSPLANT | Facility: CLINIC | Age: 41
End: 2021-01-29

## 2021-01-29 ENCOUNTER — PATIENT MESSAGE (OUTPATIENT)
Dept: FAMILY MEDICINE | Facility: CLINIC | Age: 41
End: 2021-01-29

## 2021-01-29 DIAGNOSIS — Z94.4 S/P LIVER TRANSPLANT: Primary | ICD-10-CM

## 2021-01-29 RX ORDER — URSODIOL 500 MG/1
500 TABLET, FILM COATED ORAL 2 TIMES DAILY
Qty: 60 TABLET | Refills: 1 | Status: ON HOLD | OUTPATIENT
Start: 2021-01-29 | End: 2021-03-25 | Stop reason: SDUPTHER

## 2021-01-29 RX ORDER — TACROLIMUS 1 MG/1
4 CAPSULE ORAL EVERY 12 HOURS
Qty: 240 CAPSULE | Refills: 1 | Status: ON HOLD | OUTPATIENT
Start: 2021-01-29 | End: 2021-03-25 | Stop reason: SDUPTHER

## 2021-01-29 RX ORDER — LAMIVUDINE 150 MG/1
150 TABLET, FILM COATED ORAL DAILY
Qty: 30 TABLET | Refills: 1 | Status: ON HOLD | OUTPATIENT
Start: 2021-01-29 | End: 2021-03-25 | Stop reason: SDUPTHER

## 2021-02-10 ENCOUNTER — TELEPHONE (OUTPATIENT)
Dept: TRANSPLANT | Facility: CLINIC | Age: 41
End: 2021-02-10

## 2021-02-11 ENCOUNTER — OFFICE VISIT (OUTPATIENT)
Dept: TRANSPLANT | Facility: CLINIC | Age: 41
End: 2021-02-11
Payer: MEDICAID

## 2021-02-11 ENCOUNTER — TELEPHONE (OUTPATIENT)
Dept: HEPATOLOGY | Facility: CLINIC | Age: 41
End: 2021-02-11

## 2021-02-11 ENCOUNTER — LAB VISIT (OUTPATIENT)
Dept: LAB | Facility: HOSPITAL | Age: 41
End: 2021-02-11
Attending: INTERNAL MEDICINE
Payer: MEDICAID

## 2021-02-11 ENCOUNTER — TELEPHONE (OUTPATIENT)
Dept: TRANSPLANT | Facility: CLINIC | Age: 41
End: 2021-02-11

## 2021-02-11 VITALS
TEMPERATURE: 98 F | WEIGHT: 151.88 LBS | OXYGEN SATURATION: 100 % | DIASTOLIC BLOOD PRESSURE: 90 MMHG | HEIGHT: 67 IN | SYSTOLIC BLOOD PRESSURE: 137 MMHG | BODY MASS INDEX: 23.84 KG/M2 | HEART RATE: 94 BPM | RESPIRATION RATE: 16 BRPM

## 2021-02-11 DIAGNOSIS — R17 JAUNDICE: ICD-10-CM

## 2021-02-11 DIAGNOSIS — Z94.4 S/P LIVER TRANSPLANT: Primary | ICD-10-CM

## 2021-02-11 DIAGNOSIS — Z94.4 S/P LIVER TRANSPLANT: ICD-10-CM

## 2021-02-11 DIAGNOSIS — Z29.89 PROPHYLACTIC IMMUNOTHERAPY: Primary | ICD-10-CM

## 2021-02-11 LAB
ALBUMIN SERPL BCP-MCNC: 2.7 G/DL (ref 3.5–5.2)
ALP SERPL-CCNC: 500 U/L (ref 55–135)
ALT SERPL W/O P-5'-P-CCNC: 22 U/L (ref 10–44)
ANION GAP SERPL CALC-SCNC: 8 MMOL/L (ref 8–16)
AST SERPL-CCNC: 36 U/L (ref 10–40)
BASOPHILS # BLD AUTO: 0.04 K/UL (ref 0–0.2)
BASOPHILS NFR BLD: 1 % (ref 0–1.9)
BILIRUB SERPL-MCNC: 0.8 MG/DL (ref 0.1–1)
BUN SERPL-MCNC: 14 MG/DL (ref 6–20)
CALCIUM SERPL-MCNC: 8.3 MG/DL (ref 8.7–10.5)
CHLORIDE SERPL-SCNC: 106 MMOL/L (ref 95–110)
CO2 SERPL-SCNC: 23 MMOL/L (ref 23–29)
CREAT SERPL-MCNC: 0.8 MG/DL (ref 0.5–1.4)
DIFFERENTIAL METHOD: ABNORMAL
EOSINOPHIL # BLD AUTO: 0.2 K/UL (ref 0–0.5)
EOSINOPHIL NFR BLD: 3.8 % (ref 0–8)
ERYTHROCYTE [DISTWIDTH] IN BLOOD BY AUTOMATED COUNT: 16.5 % (ref 11.5–14.5)
EST. GFR  (AFRICAN AMERICAN): >60 ML/MIN/1.73 M^2
EST. GFR  (NON AFRICAN AMERICAN): >60 ML/MIN/1.73 M^2
GGT SERPL-CCNC: 257 U/L (ref 8–55)
GLUCOSE SERPL-MCNC: 102 MG/DL (ref 70–110)
HCT VFR BLD AUTO: 36.5 % (ref 37–48.5)
HGB BLD-MCNC: 11.1 G/DL (ref 12–16)
IMM GRANULOCYTES # BLD AUTO: 0.01 K/UL (ref 0–0.04)
IMM GRANULOCYTES NFR BLD AUTO: 0.3 % (ref 0–0.5)
INR PPP: 1 (ref 0.8–1.2)
LYMPHOCYTES # BLD AUTO: 0.8 K/UL (ref 1–4.8)
LYMPHOCYTES NFR BLD: 19 % (ref 18–48)
MCH RBC QN AUTO: 26.4 PG (ref 27–31)
MCHC RBC AUTO-ENTMCNC: 30.4 G/DL (ref 32–36)
MCV RBC AUTO: 87 FL (ref 82–98)
MONOCYTES # BLD AUTO: 0.5 K/UL (ref 0.3–1)
MONOCYTES NFR BLD: 12.8 % (ref 4–15)
NEUTROPHILS # BLD AUTO: 2.5 K/UL (ref 1.8–7.7)
NEUTROPHILS NFR BLD: 63.1 % (ref 38–73)
NRBC BLD-RTO: 0 /100 WBC
PLATELET # BLD AUTO: 243 K/UL (ref 150–350)
PMV BLD AUTO: 10.5 FL (ref 9.2–12.9)
POTASSIUM SERPL-SCNC: 4.3 MMOL/L (ref 3.5–5.1)
PROT SERPL-MCNC: 8 G/DL (ref 6–8.4)
PROTHROMBIN TIME: 10.8 SEC (ref 9–12.5)
RBC # BLD AUTO: 4.21 M/UL (ref 4–5.4)
SODIUM SERPL-SCNC: 137 MMOL/L (ref 136–145)
WBC # BLD AUTO: 4 K/UL (ref 3.9–12.7)

## 2021-02-11 PROCEDURE — 80321 ALCOHOLS BIOMARKERS 1OR 2: CPT

## 2021-02-11 PROCEDURE — 99215 OFFICE O/P EST HI 40 MIN: CPT | Mod: S$PBB,,, | Performed by: INTERNAL MEDICINE

## 2021-02-11 PROCEDURE — 99999 PR PBB SHADOW E&M-EST. PATIENT-LVL V: ICD-10-PCS | Mod: PBBFAC,,, | Performed by: INTERNAL MEDICINE

## 2021-02-11 PROCEDURE — 99215 PR OFFICE/OUTPT VISIT, EST, LEVL V, 40-54 MIN: ICD-10-PCS | Mod: S$PBB,,, | Performed by: INTERNAL MEDICINE

## 2021-02-11 PROCEDURE — 80053 COMPREHEN METABOLIC PANEL: CPT

## 2021-02-11 PROCEDURE — 99999 PR PBB SHADOW E&M-EST. PATIENT-LVL V: CPT | Mod: PBBFAC,,, | Performed by: INTERNAL MEDICINE

## 2021-02-11 PROCEDURE — 82977 ASSAY OF GGT: CPT

## 2021-02-11 PROCEDURE — 80307 DRUG TEST PRSMV CHEM ANLYZR: CPT

## 2021-02-11 PROCEDURE — 85025 COMPLETE CBC W/AUTO DIFF WBC: CPT

## 2021-02-11 PROCEDURE — 85610 PROTHROMBIN TIME: CPT

## 2021-02-11 PROCEDURE — 99215 OFFICE O/P EST HI 40 MIN: CPT | Mod: PBBFAC,PN | Performed by: INTERNAL MEDICINE

## 2021-02-15 ENCOUNTER — TELEPHONE (OUTPATIENT)
Dept: TRANSPLANT | Facility: CLINIC | Age: 41
End: 2021-02-15

## 2021-02-15 LAB — PHOSPHATIDYLETHANOL (PETH): NEGATIVE NG/ML

## 2021-02-17 ENCOUNTER — TELEPHONE (OUTPATIENT)
Dept: TRANSPLANT | Facility: CLINIC | Age: 41
End: 2021-02-17

## 2021-02-23 ENCOUNTER — TELEPHONE (OUTPATIENT)
Dept: TRANSPLANT | Facility: CLINIC | Age: 41
End: 2021-02-23

## 2021-02-23 ENCOUNTER — PATIENT MESSAGE (OUTPATIENT)
Dept: TRANSPLANT | Facility: CLINIC | Age: 41
End: 2021-02-23

## 2021-02-25 ENCOUNTER — TELEPHONE (OUTPATIENT)
Dept: ENDOSCOPY | Facility: HOSPITAL | Age: 41
End: 2021-02-25

## 2021-02-25 ENCOUNTER — PATIENT MESSAGE (OUTPATIENT)
Dept: TRANSPLANT | Facility: CLINIC | Age: 41
End: 2021-02-25

## 2021-02-25 ENCOUNTER — HOSPITAL ENCOUNTER (OUTPATIENT)
Dept: RADIOLOGY | Facility: HOSPITAL | Age: 41
Discharge: HOME | End: 2021-02-25
Attending: INTERNAL MEDICINE
Payer: MEDICAID

## 2021-02-25 ENCOUNTER — TELEPHONE (OUTPATIENT)
Dept: TRANSPLANT | Facility: CLINIC | Age: 41
End: 2021-02-25

## 2021-02-25 DIAGNOSIS — R17 JAUNDICE: ICD-10-CM

## 2021-02-25 DIAGNOSIS — R93.89 ABNORMAL FINDING ON IMAGING: Primary | ICD-10-CM

## 2021-02-25 PROCEDURE — 74181 MRI ABDOMEN W/O CONTRAST: CPT | Mod: TC

## 2021-02-25 PROCEDURE — 74181 MRI ABDOMEN WITHOUT CONTRAST MRCP: ICD-10-PCS | Mod: 26,,, | Performed by: RADIOLOGY

## 2021-02-25 PROCEDURE — 76376 3D RENDER W/INTRP POSTPROCES: CPT | Mod: 26,,, | Performed by: RADIOLOGY

## 2021-02-25 PROCEDURE — 74181 MRI ABDOMEN W/O CONTRAST: CPT | Mod: 26,,, | Performed by: RADIOLOGY

## 2021-02-25 PROCEDURE — 76376 MRI ABDOMEN WITHOUT CONTRAST MRCP: ICD-10-PCS | Mod: 26,,, | Performed by: RADIOLOGY

## 2021-02-26 ENCOUNTER — TELEPHONE (OUTPATIENT)
Dept: TRANSPLANT | Facility: CLINIC | Age: 41
End: 2021-02-26

## 2021-02-27 ENCOUNTER — PATIENT MESSAGE (OUTPATIENT)
Dept: ENDOSCOPY | Facility: HOSPITAL | Age: 41
End: 2021-02-27

## 2021-02-27 ENCOUNTER — TELEPHONE (OUTPATIENT)
Dept: ENDOSCOPY | Facility: HOSPITAL | Age: 41
End: 2021-02-27

## 2021-03-17 ENCOUNTER — TELEPHONE (OUTPATIENT)
Dept: ENDOSCOPY | Facility: HOSPITAL | Age: 41
End: 2021-03-17

## 2021-03-17 ENCOUNTER — PATIENT MESSAGE (OUTPATIENT)
Dept: ENDOSCOPY | Facility: HOSPITAL | Age: 41
End: 2021-03-17

## 2021-03-17 DIAGNOSIS — R93.3 ABNORMAL FINDING ON GI TRACT IMAGING: ICD-10-CM

## 2021-03-18 PROCEDURE — 43260 ERCP W/SPECIMEN COLLECTION: CPT | Mod: 74 | Performed by: INTERNAL MEDICINE

## 2021-03-23 ENCOUNTER — HOSPITAL ENCOUNTER (OUTPATIENT)
Dept: RADIOLOGY | Facility: HOSPITAL | Age: 41
Discharge: HOME | End: 2021-03-23
Attending: NURSE PRACTITIONER
Payer: MEDICAID

## 2021-03-23 VITALS — HEIGHT: 67 IN | BODY MASS INDEX: 23.67 KG/M2 | WEIGHT: 150.81 LBS

## 2021-03-23 DIAGNOSIS — Z12.31 OTHER SCREENING MAMMOGRAM: ICD-10-CM

## 2021-03-23 PROCEDURE — 77067 SCR MAMMO BI INCL CAD: CPT | Mod: 26,,, | Performed by: RADIOLOGY

## 2021-03-23 PROCEDURE — 77067 MAMMO DIGITAL SCREENING BILAT WITH TOMO: ICD-10-PCS | Mod: 26,,, | Performed by: RADIOLOGY

## 2021-03-23 PROCEDURE — 77063 MAMMO DIGITAL SCREENING BILAT WITH TOMO: ICD-10-PCS | Mod: 26,,, | Performed by: RADIOLOGY

## 2021-03-23 PROCEDURE — 77063 BREAST TOMOSYNTHESIS BI: CPT | Mod: 26,,, | Performed by: RADIOLOGY

## 2021-03-23 PROCEDURE — 77067 SCR MAMMO BI INCL CAD: CPT | Mod: TC

## 2021-03-25 ENCOUNTER — ANESTHESIA EVENT (OUTPATIENT)
Dept: ENDOSCOPY | Facility: HOSPITAL | Age: 41
End: 2021-03-25
Payer: MEDICAID

## 2021-03-25 ENCOUNTER — ANESTHESIA (OUTPATIENT)
Dept: ENDOSCOPY | Facility: HOSPITAL | Age: 41
End: 2021-03-25
Payer: MEDICAID

## 2021-03-25 ENCOUNTER — HOSPITAL ENCOUNTER (OUTPATIENT)
Facility: HOSPITAL | Age: 41
Discharge: HOME | End: 2021-03-25
Attending: INTERNAL MEDICINE | Admitting: INTERNAL MEDICINE
Payer: MEDICAID

## 2021-03-25 ENCOUNTER — TELEPHONE (OUTPATIENT)
Dept: HEPATOLOGY | Facility: CLINIC | Age: 41
End: 2021-03-25

## 2021-03-25 VITALS
BODY MASS INDEX: 21.97 KG/M2 | HEART RATE: 84 BPM | DIASTOLIC BLOOD PRESSURE: 88 MMHG | HEIGHT: 67 IN | SYSTOLIC BLOOD PRESSURE: 123 MMHG | OXYGEN SATURATION: 100 % | WEIGHT: 140 LBS | TEMPERATURE: 98 F | RESPIRATION RATE: 26 BRPM

## 2021-03-25 DIAGNOSIS — Z94.4 S/P LIVER TRANSPLANT: Primary | ICD-10-CM

## 2021-03-25 DIAGNOSIS — Z94.4 S/P LIVER TRANSPLANT: ICD-10-CM

## 2021-03-25 DIAGNOSIS — T86.49 BILIARY STRICTURE OF TRANSPLANTED LIVER: ICD-10-CM

## 2021-03-25 DIAGNOSIS — K83.1 BILIARY STRICTURE OF TRANSPLANTED LIVER: ICD-10-CM

## 2021-03-25 PROCEDURE — 25000003 PHARM REV CODE 250: Performed by: INTERNAL MEDICINE

## 2021-03-25 PROCEDURE — 00732 ANES UPR GI NDSC PX ERCP: CPT | Performed by: INTERNAL MEDICINE

## 2021-03-25 PROCEDURE — 37000009 HC ANESTHESIA EA ADD 15 MINS: Performed by: INTERNAL MEDICINE

## 2021-03-25 PROCEDURE — 63600175 PHARM REV CODE 636 W HCPCS: Performed by: INTERNAL MEDICINE

## 2021-03-25 PROCEDURE — 44360 SMALL BOWEL ENDOSCOPY: CPT | Mod: ,,, | Performed by: INTERNAL MEDICINE

## 2021-03-25 PROCEDURE — 43260 ERCP W/SPECIMEN COLLECTION: CPT | Mod: 74 | Performed by: INTERNAL MEDICINE

## 2021-03-25 PROCEDURE — D9220A PRA ANESTHESIA: Mod: ANES,,, | Performed by: ANESTHESIOLOGY

## 2021-03-25 PROCEDURE — D9220A PRA ANESTHESIA: ICD-10-PCS | Mod: ANES,,, | Performed by: ANESTHESIOLOGY

## 2021-03-25 PROCEDURE — 44360 PR SMALL BOWEL ENDOSCOPY,PAST 2ND DUOD: ICD-10-PCS | Mod: ,,, | Performed by: INTERNAL MEDICINE

## 2021-03-25 PROCEDURE — 25000003 PHARM REV CODE 250: Performed by: NURSE ANESTHETIST, CERTIFIED REGISTERED

## 2021-03-25 PROCEDURE — 37000008 HC ANESTHESIA 1ST 15 MINUTES: Performed by: INTERNAL MEDICINE

## 2021-03-25 PROCEDURE — 44360 SMALL BOWEL ENDOSCOPY: CPT | Performed by: INTERNAL MEDICINE

## 2021-03-25 PROCEDURE — D9220A PRA ANESTHESIA: ICD-10-PCS | Mod: CRNA,,, | Performed by: NURSE ANESTHETIST, CERTIFIED REGISTERED

## 2021-03-25 PROCEDURE — D9220A PRA ANESTHESIA: Mod: CRNA,,, | Performed by: NURSE ANESTHETIST, CERTIFIED REGISTERED

## 2021-03-25 PROCEDURE — 63600175 PHARM REV CODE 636 W HCPCS: Performed by: NURSE ANESTHETIST, CERTIFIED REGISTERED

## 2021-03-25 RX ORDER — SODIUM CHLORIDE 0.9 % (FLUSH) 0.9 %
10 SYRINGE (ML) INJECTION
Status: DISCONTINUED | OUTPATIENT
Start: 2021-03-25 | End: 2021-03-25 | Stop reason: HOSPADM

## 2021-03-25 RX ORDER — LIDOCAINE HYDROCHLORIDE 20 MG/ML
INJECTION, SOLUTION EPIDURAL; INFILTRATION; INTRACAUDAL; PERINEURAL
Status: DISCONTINUED | OUTPATIENT
Start: 2021-03-25 | End: 2021-03-25

## 2021-03-25 RX ORDER — SODIUM CHLORIDE 9 MG/ML
INJECTION, SOLUTION INTRAVENOUS CONTINUOUS
Status: DISCONTINUED | OUTPATIENT
Start: 2021-03-25 | End: 2021-03-25 | Stop reason: HOSPADM

## 2021-03-25 RX ORDER — CIPROFLOXACIN 2 MG/ML
400 INJECTION, SOLUTION INTRAVENOUS ONCE
Status: COMPLETED | OUTPATIENT
Start: 2021-03-25 | End: 2021-03-25

## 2021-03-25 RX ORDER — MIDAZOLAM HYDROCHLORIDE 1 MG/ML
INJECTION, SOLUTION INTRAMUSCULAR; INTRAVENOUS
Status: DISCONTINUED | OUTPATIENT
Start: 2021-03-25 | End: 2021-03-25

## 2021-03-25 RX ORDER — LAMIVUDINE 150 MG/1
150 TABLET, FILM COATED ORAL DAILY
Qty: 30 TABLET | Refills: 5 | Status: SHIPPED | OUTPATIENT
Start: 2021-03-25 | End: 2021-11-04 | Stop reason: SDUPTHER

## 2021-03-25 RX ORDER — PROPOFOL 10 MG/ML
VIAL (ML) INTRAVENOUS CONTINUOUS PRN
Status: DISCONTINUED | OUTPATIENT
Start: 2021-03-25 | End: 2021-03-25

## 2021-03-25 RX ORDER — ONDANSETRON 2 MG/ML
INJECTION INTRAMUSCULAR; INTRAVENOUS
Status: DISCONTINUED | OUTPATIENT
Start: 2021-03-25 | End: 2021-03-25

## 2021-03-25 RX ORDER — TACROLIMUS 1 MG/1
4 CAPSULE ORAL EVERY 12 HOURS
Qty: 240 CAPSULE | Refills: 5 | Status: SHIPPED | OUTPATIENT
Start: 2021-03-25 | End: 2021-06-17

## 2021-03-25 RX ORDER — URSODIOL 500 MG/1
500 TABLET, FILM COATED ORAL 2 TIMES DAILY
Qty: 60 TABLET | Refills: 5 | Status: SHIPPED | OUTPATIENT
Start: 2021-03-25 | End: 2021-11-04 | Stop reason: SDUPTHER

## 2021-03-25 RX ORDER — PROPOFOL 10 MG/ML
VIAL (ML) INTRAVENOUS
Status: DISCONTINUED | OUTPATIENT
Start: 2021-03-25 | End: 2021-03-25

## 2021-03-25 RX ADMIN — SODIUM CHLORIDE: 0.9 INJECTION, SOLUTION INTRAVENOUS at 10:03

## 2021-03-25 RX ADMIN — PROPOFOL 30 MG: 10 INJECTION, EMULSION INTRAVENOUS at 10:03

## 2021-03-25 RX ADMIN — PROPOFOL 175 MCG/KG/MIN: 10 INJECTION, EMULSION INTRAVENOUS at 10:03

## 2021-03-25 RX ADMIN — PROPOFOL 50 MG: 10 INJECTION, EMULSION INTRAVENOUS at 10:03

## 2021-03-25 RX ADMIN — LIDOCAINE HYDROCHLORIDE 50 MG: 20 INJECTION, SOLUTION EPIDURAL; INFILTRATION; INTRACAUDAL at 10:03

## 2021-03-25 RX ADMIN — CIPROFLOXACIN 400 MG: 2 INJECTION, SOLUTION INTRAVENOUS at 10:03

## 2021-03-25 RX ADMIN — ONDANSETRON 4 MG: 2 INJECTION INTRAMUSCULAR; INTRAVENOUS at 10:03

## 2021-03-25 RX ADMIN — MIDAZOLAM 2 MG: 1 INJECTION INTRAMUSCULAR; INTRAVENOUS at 10:03

## 2021-03-30 ENCOUNTER — TELEPHONE (OUTPATIENT)
Dept: INTERVENTIONAL RADIOLOGY/VASCULAR | Facility: OTHER | Age: 41
End: 2021-03-30

## 2021-03-30 ENCOUNTER — CONFERENCE (OUTPATIENT)
Dept: TRANSPLANT | Facility: CLINIC | Age: 41
End: 2021-03-30

## 2021-03-30 ENCOUNTER — TELEPHONE (OUTPATIENT)
Dept: HEPATOLOGY | Facility: CLINIC | Age: 41
End: 2021-03-30

## 2021-03-30 DIAGNOSIS — K83.1 BILIARY OBSTRUCTION: Primary | ICD-10-CM

## 2021-03-31 ENCOUNTER — TELEPHONE (OUTPATIENT)
Dept: INTERVENTIONAL RADIOLOGY/VASCULAR | Facility: OTHER | Age: 41
End: 2021-03-31

## 2021-04-13 DIAGNOSIS — K83.1 BILIARY OBSTRUCTION: Primary | ICD-10-CM

## 2021-04-19 ENCOUNTER — PATIENT MESSAGE (OUTPATIENT)
Dept: TRANSPLANT | Facility: CLINIC | Age: 41
End: 2021-04-19

## 2021-04-23 ENCOUNTER — TELEPHONE (OUTPATIENT)
Dept: TRANSPLANT | Facility: CLINIC | Age: 41
End: 2021-04-23

## 2021-05-10 DIAGNOSIS — Z94.4 S/P LIVER TRANSPLANT: ICD-10-CM

## 2021-05-12 ENCOUNTER — PATIENT MESSAGE (OUTPATIENT)
Dept: FAMILY MEDICINE | Facility: CLINIC | Age: 41
End: 2021-05-12

## 2021-05-12 RX ORDER — PANTOPRAZOLE SODIUM 40 MG/1
TABLET, DELAYED RELEASE ORAL
Qty: 30 TABLET | Refills: 0 | Status: SHIPPED | OUTPATIENT
Start: 2021-05-12 | End: 2021-06-07

## 2021-05-19 ENCOUNTER — TELEPHONE (OUTPATIENT)
Dept: TRANSPLANT | Facility: CLINIC | Age: 41
End: 2021-05-19

## 2021-06-14 ENCOUNTER — LAB VISIT (OUTPATIENT)
Dept: LAB | Facility: HOSPITAL | Age: 41
End: 2021-06-14
Attending: INTERNAL MEDICINE
Payer: MEDICAID

## 2021-06-14 DIAGNOSIS — Z94.4 S/P LIVER TRANSPLANT: ICD-10-CM

## 2021-06-14 LAB
ALBUMIN SERPL BCP-MCNC: 3.4 G/DL (ref 3.5–5.2)
ALP SERPL-CCNC: 325 U/L (ref 55–135)
ALT SERPL W/O P-5'-P-CCNC: 27 U/L (ref 10–44)
ANION GAP SERPL CALC-SCNC: 9 MMOL/L (ref 8–16)
AST SERPL-CCNC: 37 U/L (ref 10–40)
BASOPHILS # BLD AUTO: 0.03 K/UL (ref 0–0.2)
BASOPHILS NFR BLD: 0.7 % (ref 0–1.9)
BILIRUB SERPL-MCNC: 0.3 MG/DL (ref 0.1–1)
BUN SERPL-MCNC: 12 MG/DL (ref 6–20)
CALCIUM SERPL-MCNC: 9 MG/DL (ref 8.7–10.5)
CHLORIDE SERPL-SCNC: 106 MMOL/L (ref 95–110)
CO2 SERPL-SCNC: 24 MMOL/L (ref 23–29)
CREAT SERPL-MCNC: 0.8 MG/DL (ref 0.5–1.4)
DIFFERENTIAL METHOD: NORMAL
EOSINOPHIL # BLD AUTO: 0.1 K/UL (ref 0–0.5)
EOSINOPHIL NFR BLD: 1.7 % (ref 0–8)
ERYTHROCYTE [DISTWIDTH] IN BLOOD BY AUTOMATED COUNT: 13.5 % (ref 11.5–14.5)
EST. GFR  (AFRICAN AMERICAN): >60 ML/MIN/1.73 M^2
EST. GFR  (NON AFRICAN AMERICAN): >60 ML/MIN/1.73 M^2
GLUCOSE SERPL-MCNC: 84 MG/DL (ref 70–110)
HCT VFR BLD AUTO: 37.2 % (ref 37–48.5)
HGB BLD-MCNC: 12.1 G/DL (ref 12–16)
IMM GRANULOCYTES # BLD AUTO: 0.01 K/UL (ref 0–0.04)
IMM GRANULOCYTES NFR BLD AUTO: 0.2 % (ref 0–0.5)
LYMPHOCYTES # BLD AUTO: 1.5 K/UL (ref 1–4.8)
LYMPHOCYTES NFR BLD: 33 % (ref 18–48)
MCH RBC QN AUTO: 28.3 PG (ref 27–31)
MCHC RBC AUTO-ENTMCNC: 32.5 G/DL (ref 32–36)
MCV RBC AUTO: 87 FL (ref 82–98)
MONOCYTES # BLD AUTO: 0.5 K/UL (ref 0.3–1)
MONOCYTES NFR BLD: 10.2 % (ref 4–15)
NEUTROPHILS # BLD AUTO: 2.5 K/UL (ref 1.8–7.7)
NEUTROPHILS NFR BLD: 54.2 % (ref 38–73)
NRBC BLD-RTO: 0 /100 WBC
PLATELET # BLD AUTO: 258 K/UL (ref 150–450)
PMV BLD AUTO: 9.4 FL (ref 9.2–12.9)
POTASSIUM SERPL-SCNC: 4.1 MMOL/L (ref 3.5–5.1)
PROT SERPL-MCNC: 8.5 G/DL (ref 6–8.4)
RBC # BLD AUTO: 4.27 M/UL (ref 4–5.4)
SODIUM SERPL-SCNC: 139 MMOL/L (ref 136–145)
WBC # BLD AUTO: 4.6 K/UL (ref 3.9–12.7)

## 2021-06-14 PROCEDURE — 82977 ASSAY OF GGT: CPT | Performed by: INTERNAL MEDICINE

## 2021-06-14 PROCEDURE — 80053 COMPREHEN METABOLIC PANEL: CPT | Performed by: INTERNAL MEDICINE

## 2021-06-14 PROCEDURE — 80197 ASSAY OF TACROLIMUS: CPT | Performed by: INTERNAL MEDICINE

## 2021-06-14 PROCEDURE — 80321 ALCOHOLS BIOMARKERS 1OR 2: CPT | Performed by: INTERNAL MEDICINE

## 2021-06-14 PROCEDURE — 36415 COLL VENOUS BLD VENIPUNCTURE: CPT | Performed by: INTERNAL MEDICINE

## 2021-06-14 PROCEDURE — 85025 COMPLETE CBC W/AUTO DIFF WBC: CPT | Performed by: INTERNAL MEDICINE

## 2021-06-15 LAB
GGT SERPL-CCNC: 293 U/L (ref 8–55)
TACROLIMUS BLD-MCNC: <2 NG/ML (ref 5–15)
TACROLIMUS, NORMALIZED: <2 NG/ML (ref 5–15)

## 2021-06-16 ENCOUNTER — PATIENT MESSAGE (OUTPATIENT)
Dept: TRANSPLANT | Facility: CLINIC | Age: 41
End: 2021-06-16

## 2021-06-17 ENCOUNTER — PATIENT MESSAGE (OUTPATIENT)
Dept: TRANSPLANT | Facility: CLINIC | Age: 41
End: 2021-06-17

## 2021-06-17 DIAGNOSIS — Z94.4 S/P LIVER TRANSPLANT: ICD-10-CM

## 2021-06-17 RX ORDER — TACROLIMUS 1 MG/1
6 CAPSULE ORAL EVERY 12 HOURS
Qty: 360 CAPSULE | Refills: 5 | Status: SHIPPED | OUTPATIENT
Start: 2021-06-17 | End: 2022-08-17 | Stop reason: SDUPTHER

## 2021-06-22 LAB — PHOSPHATIDYLETHANOL (PETH): 27 NG/ML

## 2021-06-25 ENCOUNTER — TELEPHONE (OUTPATIENT)
Dept: TRANSPLANT | Facility: CLINIC | Age: 41
End: 2021-06-25

## 2021-06-30 ENCOUNTER — PATIENT MESSAGE (OUTPATIENT)
Dept: TRANSPLANT | Facility: CLINIC | Age: 41
End: 2021-06-30

## 2021-07-01 ENCOUNTER — LAB VISIT (OUTPATIENT)
Dept: LAB | Facility: HOSPITAL | Age: 41
End: 2021-07-01
Attending: INTERNAL MEDICINE
Payer: MEDICAID

## 2021-07-01 DIAGNOSIS — Z94.4 S/P LIVER TRANSPLANT: ICD-10-CM

## 2021-07-01 LAB
ALBUMIN SERPL BCP-MCNC: 3.3 G/DL (ref 3.5–5.2)
ALP SERPL-CCNC: 348 U/L (ref 55–135)
ALT SERPL W/O P-5'-P-CCNC: 27 U/L (ref 10–44)
ANION GAP SERPL CALC-SCNC: 11 MMOL/L (ref 8–16)
AST SERPL-CCNC: 34 U/L (ref 10–40)
BASOPHILS # BLD AUTO: 0.03 K/UL (ref 0–0.2)
BASOPHILS NFR BLD: 0.5 % (ref 0–1.9)
BILIRUB SERPL-MCNC: 0.7 MG/DL (ref 0.1–1)
BUN SERPL-MCNC: 15 MG/DL (ref 6–20)
CALCIUM SERPL-MCNC: 9.4 MG/DL (ref 8.7–10.5)
CHLORIDE SERPL-SCNC: 104 MMOL/L (ref 95–110)
CO2 SERPL-SCNC: 23 MMOL/L (ref 23–29)
CREAT SERPL-MCNC: 0.8 MG/DL (ref 0.5–1.4)
DIFFERENTIAL METHOD: NORMAL
EOSINOPHIL # BLD AUTO: 0.1 K/UL (ref 0–0.5)
EOSINOPHIL NFR BLD: 1.6 % (ref 0–8)
ERYTHROCYTE [DISTWIDTH] IN BLOOD BY AUTOMATED COUNT: 13.2 % (ref 11.5–14.5)
EST. GFR  (AFRICAN AMERICAN): >60 ML/MIN/1.73 M^2
EST. GFR  (NON AFRICAN AMERICAN): >60 ML/MIN/1.73 M^2
GGT SERPL-CCNC: 237 U/L (ref 8–55)
GLUCOSE SERPL-MCNC: 106 MG/DL (ref 70–110)
HCT VFR BLD AUTO: 37 % (ref 37–48.5)
HGB BLD-MCNC: 12 G/DL (ref 12–16)
IMM GRANULOCYTES # BLD AUTO: 0.02 K/UL (ref 0–0.04)
IMM GRANULOCYTES NFR BLD AUTO: 0.4 % (ref 0–0.5)
LYMPHOCYTES # BLD AUTO: 2 K/UL (ref 1–4.8)
LYMPHOCYTES NFR BLD: 34.9 % (ref 18–48)
MCH RBC QN AUTO: 27.8 PG (ref 27–31)
MCHC RBC AUTO-ENTMCNC: 32.4 G/DL (ref 32–36)
MCV RBC AUTO: 86 FL (ref 82–98)
MONOCYTES # BLD AUTO: 0.6 K/UL (ref 0.3–1)
MONOCYTES NFR BLD: 11.3 % (ref 4–15)
NEUTROPHILS # BLD AUTO: 2.9 K/UL (ref 1.8–7.7)
NEUTROPHILS NFR BLD: 51.3 % (ref 38–73)
NRBC BLD-RTO: 0 /100 WBC
PLATELET # BLD AUTO: 278 K/UL (ref 150–450)
PMV BLD AUTO: 9.3 FL (ref 9.2–12.9)
POTASSIUM SERPL-SCNC: 3.8 MMOL/L (ref 3.5–5.1)
PROT SERPL-MCNC: 8.4 G/DL (ref 6–8.4)
RBC # BLD AUTO: 4.32 M/UL (ref 4–5.4)
SODIUM SERPL-SCNC: 138 MMOL/L (ref 136–145)
WBC # BLD AUTO: 5.67 K/UL (ref 3.9–12.7)

## 2021-07-01 PROCEDURE — 80053 COMPREHEN METABOLIC PANEL: CPT | Performed by: INTERNAL MEDICINE

## 2021-07-01 PROCEDURE — 82977 ASSAY OF GGT: CPT | Performed by: INTERNAL MEDICINE

## 2021-07-01 PROCEDURE — 36415 COLL VENOUS BLD VENIPUNCTURE: CPT | Performed by: INTERNAL MEDICINE

## 2021-07-01 PROCEDURE — 85025 COMPLETE CBC W/AUTO DIFF WBC: CPT | Performed by: INTERNAL MEDICINE

## 2021-07-06 ENCOUNTER — PATIENT MESSAGE (OUTPATIENT)
Dept: TRANSPLANT | Facility: CLINIC | Age: 41
End: 2021-07-06

## 2021-07-06 ENCOUNTER — PATIENT MESSAGE (OUTPATIENT)
Dept: FAMILY MEDICINE | Facility: CLINIC | Age: 41
End: 2021-07-06

## 2021-07-06 ENCOUNTER — TELEPHONE (OUTPATIENT)
Dept: TRANSPLANT | Facility: CLINIC | Age: 41
End: 2021-07-06

## 2021-07-06 DIAGNOSIS — Z94.4 S/P LIVER TRANSPLANT: ICD-10-CM

## 2021-07-06 RX ORDER — PANTOPRAZOLE SODIUM 40 MG/1
TABLET, DELAYED RELEASE ORAL
Qty: 30 TABLET | Refills: 0 | Status: SHIPPED | OUTPATIENT
Start: 2021-07-06 | End: 2021-08-05

## 2021-07-28 ENCOUNTER — PATIENT MESSAGE (OUTPATIENT)
Dept: TRANSPLANT | Facility: CLINIC | Age: 41
End: 2021-07-28

## 2021-08-03 ENCOUNTER — TELEPHONE (OUTPATIENT)
Dept: TRANSPLANT | Facility: CLINIC | Age: 41
End: 2021-08-03

## 2021-08-13 ENCOUNTER — TELEPHONE (OUTPATIENT)
Dept: TRANSPLANT | Facility: CLINIC | Age: 41
End: 2021-08-13

## 2021-08-13 DIAGNOSIS — Z94.4 S/P LIVER TRANSPLANT: Primary | ICD-10-CM

## 2021-08-17 ENCOUNTER — TELEPHONE (OUTPATIENT)
Dept: TRANSPLANT | Facility: CLINIC | Age: 41
End: 2021-08-17

## 2021-08-26 ENCOUNTER — TELEPHONE (OUTPATIENT)
Dept: TRANSPLANT | Facility: CLINIC | Age: 41
End: 2021-08-26

## 2021-09-08 ENCOUNTER — TELEPHONE (OUTPATIENT)
Dept: TRANSPLANT | Facility: CLINIC | Age: 41
End: 2021-09-08

## 2021-09-28 ENCOUNTER — TELEPHONE (OUTPATIENT)
Dept: TRANSPLANT | Facility: CLINIC | Age: 41
End: 2021-09-28

## 2021-11-04 DIAGNOSIS — Z94.4 S/P LIVER TRANSPLANT: ICD-10-CM

## 2021-11-04 RX ORDER — URSODIOL 500 MG/1
500 TABLET, FILM COATED ORAL 2 TIMES DAILY
Qty: 60 TABLET | Refills: 2 | Status: SHIPPED | OUTPATIENT
Start: 2021-11-04 | End: 2022-08-17 | Stop reason: SDUPTHER

## 2021-11-04 RX ORDER — LAMIVUDINE 150 MG/1
150 TABLET, FILM COATED ORAL DAILY
Qty: 30 TABLET | Refills: 2 | Status: SHIPPED | OUTPATIENT
Start: 2021-11-04 | End: 2022-08-17 | Stop reason: SDUPTHER

## 2021-11-11 ENCOUNTER — PATIENT MESSAGE (OUTPATIENT)
Dept: TRANSPLANT | Facility: CLINIC | Age: 41
End: 2021-11-11
Payer: MEDICAID

## 2021-11-15 ENCOUNTER — TELEPHONE (OUTPATIENT)
Dept: TRANSPLANT | Facility: CLINIC | Age: 41
End: 2021-11-15
Payer: MEDICAID

## 2021-11-17 ENCOUNTER — TELEPHONE (OUTPATIENT)
Dept: TRANSPLANT | Facility: CLINIC | Age: 41
End: 2021-11-17
Payer: MEDICAID

## 2021-11-30 ENCOUNTER — TELEPHONE (OUTPATIENT)
Dept: TRANSPLANT | Facility: CLINIC | Age: 41
End: 2021-11-30
Payer: MEDICAID

## 2021-12-03 ENCOUNTER — LAB VISIT (OUTPATIENT)
Dept: LAB | Facility: HOSPITAL | Age: 41
End: 2021-12-03
Attending: INTERNAL MEDICINE
Payer: MEDICAID

## 2021-12-03 ENCOUNTER — OFFICE VISIT (OUTPATIENT)
Dept: TRANSPLANT | Facility: CLINIC | Age: 41
End: 2021-12-03
Payer: MEDICAID

## 2021-12-03 VITALS
BODY MASS INDEX: 20.45 KG/M2 | WEIGHT: 130.31 LBS | SYSTOLIC BLOOD PRESSURE: 168 MMHG | RESPIRATION RATE: 18 BRPM | OXYGEN SATURATION: 100 % | HEART RATE: 89 BPM | HEIGHT: 67 IN | DIASTOLIC BLOOD PRESSURE: 102 MMHG | TEMPERATURE: 98 F

## 2021-12-03 DIAGNOSIS — Z94.4 S/P LIVER TRANSPLANT: ICD-10-CM

## 2021-12-03 DIAGNOSIS — Z85.828 HISTORY OF SKIN CANCER: ICD-10-CM

## 2021-12-03 DIAGNOSIS — T86.49 OTHER COMPLICATION OF LIVER TRANSPLANT: ICD-10-CM

## 2021-12-03 DIAGNOSIS — F10.10 ALCOHOL ABUSE, CONTINUOUS: Primary | ICD-10-CM

## 2021-12-03 DIAGNOSIS — K83.1 BILIARY STRICTURE OF TRANSPLANTED LIVER: ICD-10-CM

## 2021-12-03 DIAGNOSIS — T86.49 BILIARY STRICTURE OF TRANSPLANTED LIVER: ICD-10-CM

## 2021-12-03 DIAGNOSIS — K83.1 BILIARY OBSTRUCTION: ICD-10-CM

## 2021-12-03 DIAGNOSIS — Z29.89 PROPHYLACTIC IMMUNOTHERAPY: ICD-10-CM

## 2021-12-03 LAB
ALBUMIN SERPL BCP-MCNC: 3.9 G/DL (ref 3.5–5.2)
ALP SERPL-CCNC: 281 U/L (ref 55–135)
ALT SERPL W/O P-5'-P-CCNC: 37 U/L (ref 10–44)
ANION GAP SERPL CALC-SCNC: 8 MMOL/L (ref 8–16)
AST SERPL-CCNC: 45 U/L (ref 10–40)
BASOPHILS # BLD AUTO: 0.04 K/UL (ref 0–0.2)
BASOPHILS NFR BLD: 0.7 % (ref 0–1.9)
BILIRUB SERPL-MCNC: 0.5 MG/DL (ref 0.1–1)
BUN SERPL-MCNC: 20 MG/DL (ref 6–20)
CALCIUM SERPL-MCNC: 9.7 MG/DL (ref 8.7–10.5)
CHLORIDE SERPL-SCNC: 105 MMOL/L (ref 95–110)
CO2 SERPL-SCNC: 27 MMOL/L (ref 23–29)
CREAT SERPL-MCNC: 1 MG/DL (ref 0.5–1.4)
DIFFERENTIAL METHOD: NORMAL
EOSINOPHIL # BLD AUTO: 0.1 K/UL (ref 0–0.5)
EOSINOPHIL NFR BLD: 0.9 % (ref 0–8)
ERYTHROCYTE [DISTWIDTH] IN BLOOD BY AUTOMATED COUNT: 13.5 % (ref 11.5–14.5)
EST. GFR  (AFRICAN AMERICAN): >60 ML/MIN/1.73 M^2
EST. GFR  (NON AFRICAN AMERICAN): >60 ML/MIN/1.73 M^2
ETHANOL SERPL-MCNC: <10 MG/DL
GGT SERPL-CCNC: 223 U/L (ref 8–55)
GLUCOSE SERPL-MCNC: 81 MG/DL (ref 70–110)
HCT VFR BLD AUTO: 41.7 % (ref 37–48.5)
HGB BLD-MCNC: 13.6 G/DL (ref 12–16)
IMM GRANULOCYTES # BLD AUTO: 0.01 K/UL (ref 0–0.04)
IMM GRANULOCYTES NFR BLD AUTO: 0.2 % (ref 0–0.5)
INR PPP: 0.9 (ref 0.8–1.2)
LYMPHOCYTES # BLD AUTO: 1.5 K/UL (ref 1–4.8)
LYMPHOCYTES NFR BLD: 27.5 % (ref 18–48)
MCH RBC QN AUTO: 30.5 PG (ref 27–31)
MCHC RBC AUTO-ENTMCNC: 32.6 G/DL (ref 32–36)
MCV RBC AUTO: 94 FL (ref 82–98)
MONOCYTES # BLD AUTO: 0.6 K/UL (ref 0.3–1)
MONOCYTES NFR BLD: 10.5 % (ref 4–15)
NEUTROPHILS # BLD AUTO: 3.2 K/UL (ref 1.8–7.7)
NEUTROPHILS NFR BLD: 60.2 % (ref 38–73)
NRBC BLD-RTO: 0 /100 WBC
PLATELET # BLD AUTO: 267 K/UL (ref 150–450)
PMV BLD AUTO: 10.4 FL (ref 9.2–12.9)
POTASSIUM SERPL-SCNC: 4.7 MMOL/L (ref 3.5–5.1)
PROT SERPL-MCNC: 7.8 G/DL (ref 6–8.4)
PROTHROMBIN TIME: 10.3 SEC (ref 9–12.5)
RBC # BLD AUTO: 4.46 M/UL (ref 4–5.4)
SODIUM SERPL-SCNC: 140 MMOL/L (ref 136–145)
WBC # BLD AUTO: 5.35 K/UL (ref 3.9–12.7)

## 2021-12-03 PROCEDURE — 87517 HEPATITIS B DNA QUANT: CPT | Performed by: INTERNAL MEDICINE

## 2021-12-03 PROCEDURE — 36415 COLL VENOUS BLD VENIPUNCTURE: CPT | Mod: PN | Performed by: INTERNAL MEDICINE

## 2021-12-03 PROCEDURE — 87340 HEPATITIS B SURFACE AG IA: CPT | Performed by: INTERNAL MEDICINE

## 2021-12-03 PROCEDURE — 99999 PR PBB SHADOW E&M-EST. PATIENT-LVL V: ICD-10-PCS | Mod: PBBFAC,,, | Performed by: INTERNAL MEDICINE

## 2021-12-03 PROCEDURE — 85610 PROTHROMBIN TIME: CPT | Performed by: INTERNAL MEDICINE

## 2021-12-03 PROCEDURE — 82077 ASSAY SPEC XCP UR&BREATH IA: CPT | Performed by: INTERNAL MEDICINE

## 2021-12-03 PROCEDURE — 99999 PR PBB SHADOW E&M-EST. PATIENT-LVL V: CPT | Mod: PBBFAC,,, | Performed by: INTERNAL MEDICINE

## 2021-12-03 PROCEDURE — 80307 DRUG TEST PRSMV CHEM ANLYZR: CPT | Performed by: INTERNAL MEDICINE

## 2021-12-03 PROCEDURE — 99215 PR OFFICE/OUTPT VISIT, EST, LEVL V, 40-54 MIN: ICD-10-PCS | Mod: S$PBB,,, | Performed by: INTERNAL MEDICINE

## 2021-12-03 PROCEDURE — 99215 OFFICE O/P EST HI 40 MIN: CPT | Mod: PBBFAC,PN | Performed by: INTERNAL MEDICINE

## 2021-12-03 PROCEDURE — 82977 ASSAY OF GGT: CPT | Performed by: INTERNAL MEDICINE

## 2021-12-03 PROCEDURE — 86803 HEPATITIS C AB TEST: CPT | Performed by: INTERNAL MEDICINE

## 2021-12-03 PROCEDURE — 85025 COMPLETE CBC W/AUTO DIFF WBC: CPT | Performed by: INTERNAL MEDICINE

## 2021-12-03 PROCEDURE — 99215 OFFICE O/P EST HI 40 MIN: CPT | Mod: S$PBB,,, | Performed by: INTERNAL MEDICINE

## 2021-12-03 PROCEDURE — 80321 ALCOHOLS BIOMARKERS 1OR 2: CPT | Performed by: INTERNAL MEDICINE

## 2021-12-03 PROCEDURE — 87522 HEPATITIS C REVRS TRNSCRPJ: CPT | Performed by: INTERNAL MEDICINE

## 2021-12-03 PROCEDURE — 80053 COMPREHEN METABOLIC PANEL: CPT | Performed by: INTERNAL MEDICINE

## 2021-12-06 LAB
AMPHETAMINES SERPL QL: POSITIVE
BARBITURATES SERPL QL SCN: NEGATIVE
BENZODIAZ SERPL QL SCN: NEGATIVE
BZE SERPL QL: NEGATIVE
CARBOXYTHC SERPL QL SCN: NEGATIVE
ETHANOL SERPL QL SCN: NEGATIVE
HBV SURFACE AG SERPL QL IA: NEGATIVE
HBV SURFACE AG SERPL QL IA: NEGATIVE
HCV AB SERPL QL IA: NEGATIVE
METHADONE SERPL QL SCN: NEGATIVE
OPIATES SERPL QL SCN: NEGATIVE
PCP SERPL QL SCN: NEGATIVE
PROPOXYPH SERPL QL: NEGATIVE

## 2021-12-07 LAB
HBV DNA SERPL NAA+PROBE-ACNC: <10 IU/ML
HBV DNA SERPL NAA+PROBE-ACNC: <10 IU/ML
HBV DNA SERPL NAA+PROBE-LOG IU: <1 LOG (10) IU/ML
HBV DNA SERPL NAA+PROBE-LOG IU: <1 LOG (10) IU/ML
HBV DNA SERPL QL NAA+PROBE: NOT DETECTED
HBV DNA SERPL QL NAA+PROBE: NOT DETECTED
HEPATITIS C VIRUS (HCV) RNA DETECTION/QUANTIFICATION RT-PCR: NORMAL IU/ML
PETH 16:0/18.1 (POPETH): <10 NG/ML
PETH 16:0/18.1 (POPETH): <10 NG/ML
PETH 16:0/18.2 (PLPETH): <10 NG/ML
PETH 16:0/18.2 (PLPETH): <10 NG/ML

## 2021-12-13 ENCOUNTER — TELEPHONE (OUTPATIENT)
Dept: TRANSPLANT | Facility: CLINIC | Age: 41
End: 2021-12-13
Payer: MEDICAID

## 2021-12-13 DIAGNOSIS — Z94.4 S/P LIVER TRANSPLANT: Primary | ICD-10-CM

## 2022-01-11 ENCOUNTER — PATIENT MESSAGE (OUTPATIENT)
Dept: ADMINISTRATIVE | Facility: HOSPITAL | Age: 42
End: 2022-01-11
Payer: MEDICAID

## 2022-03-16 ENCOUNTER — TELEPHONE (OUTPATIENT)
Dept: TRANSPLANT | Facility: CLINIC | Age: 42
End: 2022-03-16
Payer: MEDICAID

## 2022-03-16 NOTE — LETTER
March 16, 2022    Jose Berman  [address] [place] [address]          Dear Garciajesusita Berman:  MRN: [id number]    Your lab work was due to be drawn on 3/14/22.  We contacted your lab and were unable to get test results.  It is very important to get your labs drawn as scheduled.  We cannot monitor you for rejection, infections, or drug toxicity side effects without lab results.  Please call us at [phone] as soon as possible to let us know when you plan to have labs drawn.    Sincerely,    Marie Ramos, RN,BSN,CCTC    Your Liver Transplant Coordinator    [facility]  [address] [place] [address]  [phone]

## 2022-03-21 DIAGNOSIS — Z94.4 S/P LIVER TRANSPLANT: ICD-10-CM

## 2022-03-21 DIAGNOSIS — Z12.31 BREAST CANCER SCREENING BY MAMMOGRAM: Primary | ICD-10-CM

## 2022-03-21 RX ORDER — FUROSEMIDE 20 MG/1
TABLET ORAL
Qty: 90 TABLET | Refills: 3 | OUTPATIENT
Start: 2022-03-21

## 2022-03-21 NOTE — TELEPHONE ENCOUNTER
Please let patient know her request for lasix refill has been denied d/t not seen since 5/4/2020 thus appt encouraged yet recommend contacting the transplant team as they really should manage this medication since the edema and swelling is since her liver transplant. Also, she will be due for her mammogram after 3/23/22 thus ordered. Thanks

## 2022-04-04 ENCOUNTER — PATIENT MESSAGE (OUTPATIENT)
Dept: ADMINISTRATIVE | Facility: HOSPITAL | Age: 42
End: 2022-04-04
Payer: MEDICAID

## 2022-05-02 DIAGNOSIS — F33.41 RECURRENT MAJOR DEPRESSIVE DISORDER, IN PARTIAL REMISSION: ICD-10-CM

## 2022-05-02 DIAGNOSIS — Z94.4 S/P LIVER TRANSPLANT: ICD-10-CM

## 2022-05-06 RX ORDER — FUROSEMIDE 20 MG/1
TABLET ORAL
Qty: 90 TABLET | Refills: 3 | OUTPATIENT
Start: 2022-05-06

## 2022-05-06 RX ORDER — DULOXETIN HYDROCHLORIDE 60 MG/1
CAPSULE, DELAYED RELEASE ORAL
Qty: 60 CAPSULE | Refills: 3 | OUTPATIENT
Start: 2022-05-06

## 2022-05-09 DIAGNOSIS — F33.41 RECURRENT MAJOR DEPRESSIVE DISORDER, IN PARTIAL REMISSION: ICD-10-CM

## 2022-05-09 DIAGNOSIS — Z94.4 S/P LIVER TRANSPLANT: ICD-10-CM

## 2022-05-09 NOTE — TELEPHONE ENCOUNTER
----- Message from Karen Pressley sent at 5/9/2022 12:15 PM CDT -----  Contact: Marina  Type:  RX Refill Request    [facility]: [facility]  Refill or New Rx: refill  RX Name and Strength:    furosemide (LASIX) 20 MG table, DULoxetine (CYMBALTA) 60 MG capsule  How is the patient currently taking it? (ex. 1XDay):Directed   Is this a 30 day or 90 day RX: Directed [facility] - [place], [place] - [address]  [address] [place] [address]  Phone: [phone] Fax: [phone]    Best Call Back Number:  [phone]  Additional Information: Pt out of refills at pharm

## 2022-05-10 RX ORDER — FUROSEMIDE 20 MG/1
TABLET ORAL
Qty: 90 TABLET | Refills: 3 | OUTPATIENT
Start: 2022-05-10

## 2022-05-10 RX ORDER — DULOXETIN HYDROCHLORIDE 60 MG/1
CAPSULE, DELAYED RELEASE ORAL
Qty: 60 CAPSULE | Refills: 3 | OUTPATIENT
Start: 2022-05-10

## 2022-05-11 ENCOUNTER — PATIENT MESSAGE (OUTPATIENT)
Dept: FAMILY MEDICINE | Facility: CLINIC | Age: 42
End: 2022-05-11
Payer: MEDICAID

## 2022-05-11 ENCOUNTER — PATIENT MESSAGE (OUTPATIENT)
Dept: RESEARCH | Facility: CLINIC | Age: 42
End: 2022-05-11
Payer: MEDICAID

## 2022-05-11 NOTE — TELEPHONE ENCOUNTER
Appt needed for all refills, thus denied. This is not my first message as she has not been seen since 5/4/2000

## 2022-05-12 DIAGNOSIS — F33.41 RECURRENT MAJOR DEPRESSIVE DISORDER, IN PARTIAL REMISSION: ICD-10-CM

## 2022-05-12 DIAGNOSIS — Z94.4 S/P LIVER TRANSPLANT: ICD-10-CM

## 2022-05-12 RX ORDER — DULOXETIN HYDROCHLORIDE 60 MG/1
CAPSULE, DELAYED RELEASE ORAL
Qty: 60 CAPSULE | Refills: 3 | OUTPATIENT
Start: 2022-05-12

## 2022-05-12 RX ORDER — FUROSEMIDE 20 MG/1
TABLET ORAL
Qty: 90 TABLET | Refills: 3 | OUTPATIENT
Start: 2022-05-12

## 2022-05-17 ENCOUNTER — TELEPHONE (OUTPATIENT)
Dept: TRANSPLANT | Facility: CLINIC | Age: 42
End: 2022-05-17
Payer: MEDICAID

## 2022-05-17 NOTE — LETTER
May 17, 2022    Jose Berman  [address] [place] [address]          Dear Jose Berman:  MRN: [id number]    Your lab work was due to be drawn on 3/14/22.  We contacted your lab and were unable to get test results.  It is very important to get your labs drawn as scheduled.  We cannot monitor you for rejection, infections, or drug toxicity side effects without lab results.  Please call us at [phone] as soon as possible to let us know when you plan to have labs drawn.    Sincerely,    Marie Ramos, RN,BSN,CCTC    Your Liver Transplant Coordinator    [facility]  [address] [place] [address]  [phone]

## 2022-05-17 NOTE — TELEPHONE ENCOUNTER
No response from pt, will send missed lab letter.       ----- Message from Rohini Beck MA sent at 3/16/2022 10:48 AM CDT -----

## 2022-05-31 ENCOUNTER — PATIENT MESSAGE (OUTPATIENT)
Dept: ADMINISTRATIVE | Facility: HOSPITAL | Age: 42
End: 2022-05-31
Payer: MEDICAID

## 2022-06-07 DIAGNOSIS — Z94.4 S/P LIVER TRANSPLANT: ICD-10-CM

## 2022-06-07 DIAGNOSIS — F33.41 RECURRENT MAJOR DEPRESSIVE DISORDER, IN PARTIAL REMISSION: ICD-10-CM

## 2022-06-09 RX ORDER — FUROSEMIDE 20 MG/1
TABLET ORAL
Qty: 90 TABLET | Refills: 3 | OUTPATIENT
Start: 2022-06-09

## 2022-06-09 RX ORDER — DULOXETIN HYDROCHLORIDE 60 MG/1
CAPSULE, DELAYED RELEASE ORAL
Qty: 60 CAPSULE | Refills: 3 | OUTPATIENT
Start: 2022-06-09

## 2022-06-29 ENCOUNTER — TELEPHONE (OUTPATIENT)
Dept: TRANSPLANT | Facility: CLINIC | Age: 42
End: 2022-06-29
Payer: MEDICAID

## 2022-06-29 NOTE — LETTER
June 29, 2022    Jose Berman  [address] [place] [address]          Dear Garciajesusita Berman:  MRN: [id number]    Your lab work was due to be drawn on 3/14/22.  We contacted your lab and were unable to get test results.  It is very important to get your labs drawn as scheduled.  We cannot monitor you for rejection, infections, or drug toxicity side effects without lab results.  Please call us at [phone] as soon as possible to let us know when you plan to have labs drawn.    Sincerely,        Your Liver Transplant Coordinator    [facility]  [address] [place] [address]  [phone]

## 2022-06-29 NOTE — TELEPHONE ENCOUNTER
No response from pt, will send another missed lab letter.     ----- Message from Rohini Beck MA sent at 5/17/2022 11:32 AM CDT -----

## 2022-07-11 ENCOUNTER — TELEPHONE (OUTPATIENT)
Dept: TRANSPLANT | Facility: CLINIC | Age: 42
End: 2022-07-11
Payer: MEDICAID

## 2022-07-11 ENCOUNTER — PATIENT MESSAGE (OUTPATIENT)
Dept: TRANSPLANT | Facility: CLINIC | Age: 42
End: 2022-07-11
Payer: MEDICAID

## 2022-07-11 DIAGNOSIS — F10.10 ALCOHOL ABUSE, CONTINUOUS: ICD-10-CM

## 2022-07-11 DIAGNOSIS — Z94.4 S/P LIVER TRANSPLANT: Primary | ICD-10-CM

## 2022-07-11 DIAGNOSIS — B19.10 HEPATITIS B VIRUS INFECTION IN CADAVERIC DONOR: ICD-10-CM

## 2022-07-11 DIAGNOSIS — Z00.5 HEPATITIS B VIRUS INFECTION IN CADAVERIC DONOR: ICD-10-CM

## 2022-07-11 NOTE — TELEPHONE ENCOUNTER
Writer contacted patient however both times phone rang, call gets answered then a disconnection, unclear if being hung up on?? Or phone issue??    Writer sent my chart message to try and reach patient to see what patient needs are.      ----- Message from Earle Britt sent at 7/11/2022  2:52 PM CDT -----  Regarding: refills  Patient is needing a refill on some meds. Nonspecific.    Contact: [phone]

## 2022-07-20 ENCOUNTER — TELEPHONE (OUTPATIENT)
Dept: TRANSPLANT | Facility: CLINIC | Age: 42
End: 2022-07-20
Payer: MEDICAID

## 2022-07-20 NOTE — LETTER
July 20, 2022    Jose Berman  [address] [place] [address]          Dear Jose Berman:  MRN: [id number]    Your lab work was due to be drawn on 3/14/22.  We contacted your lab and were unable to get test results.  It is very important to get your labs drawn as scheduled.  We cannot monitor you for rejection, infections, or drug toxicity side effects without lab results.  Please call us at [phone] as soon as possible to let us know when you plan to have labs drawn.    Sincerely,    Marie Ramos, RN,BSN,CCTC    Your Liver Transplant Coordinator    [facility]  [address] [place] [address]  [phone]

## 2022-07-20 NOTE — TELEPHONE ENCOUNTER
Pt no showed lab appt again after several no shows, will send another missed lab letter.   ----- Message from Rohini Beck MA sent at 7/11/2022  4:10 PM CDT -----

## 2022-08-01 ENCOUNTER — PATIENT MESSAGE (OUTPATIENT)
Dept: TRANSPLANT | Facility: CLINIC | Age: 42
End: 2022-08-01
Payer: MEDICAID

## 2022-08-17 ENCOUNTER — HOSPITAL ENCOUNTER (OUTPATIENT)
Dept: RADIOLOGY | Facility: HOSPITAL | Age: 42
Discharge: HOME | End: 2022-08-17
Attending: INTERNAL MEDICINE
Payer: MEDICAID

## 2022-08-17 ENCOUNTER — PATIENT MESSAGE (OUTPATIENT)
Dept: TRANSPLANT | Facility: CLINIC | Age: 42
End: 2022-08-17
Payer: MEDICAID

## 2022-08-17 ENCOUNTER — HOSPITAL ENCOUNTER (OUTPATIENT)
Dept: RADIOLOGY | Facility: HOSPITAL | Age: 42
Discharge: HOME | End: 2022-08-17
Attending: NURSE PRACTITIONER
Payer: MEDICAID

## 2022-08-17 VITALS — WEIGHT: 116.88 LBS | HEIGHT: 67 IN | BODY MASS INDEX: 18.35 KG/M2

## 2022-08-17 DIAGNOSIS — Z94.4 S/P LIVER TRANSPLANT: ICD-10-CM

## 2022-08-17 DIAGNOSIS — Z12.31 BREAST CANCER SCREENING BY MAMMOGRAM: ICD-10-CM

## 2022-08-17 PROCEDURE — 77063 BREAST TOMOSYNTHESIS BI: CPT | Mod: TC

## 2022-08-17 PROCEDURE — 77063 MAMMO DIGITAL SCREENING BILAT WITH TOMO: ICD-10-PCS | Mod: 26,,, | Performed by: RADIOLOGY

## 2022-08-17 PROCEDURE — 77080 DXA BONE DENSITY AXIAL: CPT | Mod: 26,,, | Performed by: RADIOLOGY

## 2022-08-17 PROCEDURE — 77080 DEXA BONE DENSITY SPINE HIP: ICD-10-PCS | Mod: 26,,, | Performed by: RADIOLOGY

## 2022-08-17 PROCEDURE — 77067 MAMMO DIGITAL SCREENING BILAT WITH TOMO: ICD-10-PCS | Mod: 26,,, | Performed by: RADIOLOGY

## 2022-08-17 PROCEDURE — 77067 SCR MAMMO BI INCL CAD: CPT | Mod: 26,,, | Performed by: RADIOLOGY

## 2022-08-17 PROCEDURE — 77063 BREAST TOMOSYNTHESIS BI: CPT | Mod: 26,,, | Performed by: RADIOLOGY

## 2022-08-17 PROCEDURE — 77067 SCR MAMMO BI INCL CAD: CPT | Mod: TC

## 2022-08-17 PROCEDURE — 77080 DXA BONE DENSITY AXIAL: CPT | Mod: TC

## 2022-08-17 RX ORDER — LAMIVUDINE 150 MG/1
150 TABLET, FILM COATED ORAL DAILY
Qty: 30 TABLET | Refills: 5 | Status: SHIPPED | OUTPATIENT
Start: 2022-08-17 | End: 2023-08-04 | Stop reason: SDUPTHER

## 2022-08-17 RX ORDER — URSODIOL 500 MG/1
500 TABLET, FILM COATED ORAL 2 TIMES DAILY
Qty: 60 TABLET | Refills: 5 | Status: SHIPPED | OUTPATIENT
Start: 2022-08-17 | End: 2023-08-04 | Stop reason: SDUPTHER

## 2022-08-17 RX ORDER — TACROLIMUS 1 MG/1
6 CAPSULE ORAL EVERY 12 HOURS
Qty: 360 CAPSULE | Refills: 5 | Status: SHIPPED | OUTPATIENT
Start: 2022-08-17 | End: 2023-08-04 | Stop reason: SDUPTHER

## 2022-08-18 ENCOUNTER — TELEPHONE (OUTPATIENT)
Dept: TRANSPLANT | Facility: CLINIC | Age: 42
End: 2022-08-18
Payer: MEDICAID

## 2022-08-18 ENCOUNTER — PATIENT MESSAGE (OUTPATIENT)
Dept: TRANSPLANT | Facility: CLINIC | Age: 42
End: 2022-08-18
Payer: MEDICAID

## 2022-08-18 DIAGNOSIS — M86.9 OSTEOMYELITIS, UNSPECIFIED SITE, UNSPECIFIED TYPE: Primary | ICD-10-CM

## 2022-08-18 DIAGNOSIS — M81.0 OSTEOPOROSIS, UNSPECIFIED OSTEOPOROSIS TYPE, UNSPECIFIED PATHOLOGICAL FRACTURE PRESENCE: ICD-10-CM

## 2022-08-18 NOTE — TELEPHONE ENCOUNTER
Pt states she had not been taking her meds prior to labs, pt refilled medication yesterday. Will repeat labs next when pt is here for clinic appt.       ----- Message from Quita Wynn MD sent at 8/18/2022 11:14 AM CDT -----  ok  ----- Message -----  From: Marie Ramos RN  Sent: 8/18/2022   9:46 AM CDT  To: Quita Wynn MD    She was not taking it, refill sent yesterday.   ----- Message -----  From: Quita Wynn MD  Sent: 8/18/2022   8:57 AM CDT  To: [facility]    No detectable prograf. Please find out if she is taking it. Hard to imaging a negative level with a dose of 6/6

## 2022-08-18 NOTE — TELEPHONE ENCOUNTER
Will place referral to endocrine and schedule pt. Pt notified.       ----- Message from Quita Wynn MD sent at 8/17/2022  2:55 PM CDT -----  Osteoporosis- to see endocrinology

## 2022-08-31 ENCOUNTER — TELEPHONE (OUTPATIENT)
Dept: TRANSPLANT | Facility: CLINIC | Age: 42
End: 2022-08-31
Payer: MEDICAID

## 2022-08-31 NOTE — LETTER
August 31, 2022    Jose Berman  [address]  [place] [address]          Dear Garcia Cullen:  MRN: [id number]    Your lab work was due to be drawn on 8/26/22.  We contacted your lab and were unable to get test results.  It is very important to get your labs drawn as scheduled.  We cannot monitor you for rejection, infections, or drug toxicity side effects without lab results.  Please call us at [phone] as soon as possible to let us know when you plan to have labs drawn.    Sincerely,    Marie Ramos, RN,BSN,CCTC    Your Liver Transplant Coordinator    [facility]  [address] [place] [address]  [phone]

## 2022-10-10 ENCOUNTER — TELEPHONE (OUTPATIENT)
Dept: TRANSPLANT | Facility: CLINIC | Age: 42
End: 2022-10-10
Payer: MEDICAID

## 2022-10-10 NOTE — TELEPHONE ENCOUNTER
Called pt's pharmacy who states they will fax over PA information        ----- Message from Gareth Rausch sent at 10/10/2022 11:40 AM CDT -----  Regarding: call back  Pt call in regards to needing a PA for tacrolimus (PROGRAF) 1 MG Cap    [facility] [place] - [address] [address]    Call

## 2022-10-10 NOTE — TELEPHONE ENCOUNTER
Spoke with Marion. She states ICD-10 code Z94.4 does not work. She is requesting we complete PA. Requested she fax us the WeDidIt key so we can complete.   ----- Message from Edith Roper MA sent at 10/10/2022  4:27 PM CDT -----  Regarding: FW: speak with office  Contact: marion    ----- Message -----  From: Allison Javier  Sent: 10/10/2022   4:18 PM CDT  To: [facility]ley Devlin Staff  Subject: speak with office                                Marion is returning  call  to speak with office about medication Tacrolimus that is not working ...[phone]

## 2022-10-13 ENCOUNTER — TELEPHONE (OUTPATIENT)
Dept: TRANSPLANT | Facility: CLINIC | Age: 42
End: 2022-10-13
Payer: MEDICAID

## 2022-10-13 NOTE — LETTER
October 13, 2022    Jose Berman  [address]  [place] [address]          Dear Jose eBrman:  MRN: [id number]    Your lab work was due to be drawn on 8/26/22.  We contacted your lab and were unable to get test results.  It is very important to get your labs drawn as scheduled.  We cannot monitor you for rejection, infections, or drug toxicity side effects without lab results.  Please call us at [phone] as soon as possible to let us know when you plan to have labs drawn.    Sincerely,    Marie Ramos, RN,BSN,CCTC    Your Liver Transplant Coordinator    [facility]  [address] [place] [address]  [phone]

## 2022-11-14 ENCOUNTER — TELEPHONE (OUTPATIENT)
Dept: TRANSPLANT | Facility: CLINIC | Age: 42
End: 2022-11-14
Payer: COMMERCIAL

## 2022-11-14 NOTE — TELEPHONE ENCOUNTER
Per Dr. Wynn she will not sign medical clearance for pt to have extractions due to non compliance. She will sign if pt comes to clinic visit on 12/2. Called and notified pt's dentist office at [facility]. She states pt is not scheduled.

## 2022-11-30 DIAGNOSIS — F10.10 ALCOHOL ABUSE, CONTINUOUS: Primary | ICD-10-CM

## 2022-12-08 ENCOUNTER — TELEPHONE (OUTPATIENT)
Dept: TRANSPLANT | Facility: CLINIC | Age: 42
End: 2022-12-08
Payer: COMMERCIAL

## 2022-12-08 NOTE — LETTER
December 8, 2022    Jose Berman  [address]  [place] [address]          Dear Jose Berman:  MRN: [id number]    Your lab work was due to be drawn on 8/26/22.  We contacted your lab and were unable to get test results.  It is very important to get your labs drawn as scheduled. You also missed your clinic appointment with Dr. Wynn.   We cannot monitor you for rejection, infections, or drug toxicity side effects without lab results.  Please call us at [phone] as soon as possible to let us know when you plan to have labs drawn.     Sincerely,    Marie Ramos, RN,BSN,CCTC    Your Liver Transplant Coordinator    [facility]  [address] [place] [address] [phone]

## 2022-12-14 ENCOUNTER — LAB VISIT (OUTPATIENT)
Dept: LAB | Facility: HOSPITAL | Age: 42
End: 2022-12-14
Attending: INTERNAL MEDICINE
Payer: COMMERCIAL

## 2022-12-14 DIAGNOSIS — F10.10 ALCOHOL ABUSE, CONTINUOUS: ICD-10-CM

## 2022-12-14 DIAGNOSIS — B19.10 HEPATITIS B VIRUS INFECTION IN CADAVERIC DONOR: ICD-10-CM

## 2022-12-14 DIAGNOSIS — Z94.4 S/P LIVER TRANSPLANT: ICD-10-CM

## 2022-12-14 DIAGNOSIS — Z00.5 HEPATITIS B VIRUS INFECTION IN CADAVERIC DONOR: ICD-10-CM

## 2022-12-14 LAB
ALBUMIN SERPL BCP-MCNC: 3.8 G/DL (ref 3.5–5.2)
ALP SERPL-CCNC: 280 U/L (ref 55–135)
ALT SERPL W/O P-5'-P-CCNC: 30 U/L (ref 10–44)
ANION GAP SERPL CALC-SCNC: 8 MMOL/L (ref 8–16)
AST SERPL-CCNC: 33 U/L (ref 10–40)
BASOPHILS # BLD AUTO: 0.04 K/UL (ref 0–0.2)
BASOPHILS NFR BLD: 1 % (ref 0–1.9)
BILIRUB SERPL-MCNC: 0.4 MG/DL (ref 0.1–1)
BUN SERPL-MCNC: 23 MG/DL (ref 6–20)
CALCIUM SERPL-MCNC: 9 MG/DL (ref 8.7–10.5)
CHLORIDE SERPL-SCNC: 106 MMOL/L (ref 95–110)
CO2 SERPL-SCNC: 27 MMOL/L (ref 23–29)
CREAT SERPL-MCNC: 1.1 MG/DL (ref 0.5–1.4)
DIFFERENTIAL METHOD: ABNORMAL
EOSINOPHIL # BLD AUTO: 0.1 K/UL (ref 0–0.5)
EOSINOPHIL NFR BLD: 2.1 % (ref 0–8)
ERYTHROCYTE [DISTWIDTH] IN BLOOD BY AUTOMATED COUNT: 15.4 % (ref 11.5–14.5)
EST. GFR  (NO RACE VARIABLE): >60 ML/MIN/1.73 M^2
GGT SERPL-CCNC: 175 U/L (ref 8–55)
GLUCOSE SERPL-MCNC: 86 MG/DL (ref 70–110)
HCT VFR BLD AUTO: 37.5 % (ref 37–48.5)
HGB BLD-MCNC: 12.2 G/DL (ref 12–16)
IMM GRANULOCYTES # BLD AUTO: 0.01 K/UL (ref 0–0.04)
IMM GRANULOCYTES NFR BLD AUTO: 0.2 % (ref 0–0.5)
LYMPHOCYTES # BLD AUTO: 1.9 K/UL (ref 1–4.8)
LYMPHOCYTES NFR BLD: 46.1 % (ref 18–48)
MCH RBC QN AUTO: 26.8 PG (ref 27–31)
MCHC RBC AUTO-ENTMCNC: 32.5 G/DL (ref 32–36)
MCV RBC AUTO: 82 FL (ref 82–98)
MONOCYTES # BLD AUTO: 0.5 K/UL (ref 0.3–1)
MONOCYTES NFR BLD: 12.9 % (ref 4–15)
NEUTROPHILS # BLD AUTO: 1.6 K/UL (ref 1.8–7.7)
NEUTROPHILS NFR BLD: 37.7 % (ref 38–73)
NRBC BLD-RTO: 0 /100 WBC
PLATELET # BLD AUTO: 200 K/UL (ref 150–450)
PMV BLD AUTO: 9.6 FL (ref 9.2–12.9)
POTASSIUM SERPL-SCNC: 3.4 MMOL/L (ref 3.5–5.1)
PROT SERPL-MCNC: 8 G/DL (ref 6–8.4)
RBC # BLD AUTO: 4.56 M/UL (ref 4–5.4)
SODIUM SERPL-SCNC: 141 MMOL/L (ref 136–145)
WBC # BLD AUTO: 4.19 K/UL (ref 3.9–12.7)

## 2022-12-14 PROCEDURE — 82977 ASSAY OF GGT: CPT | Performed by: INTERNAL MEDICINE

## 2022-12-14 PROCEDURE — 85025 COMPLETE CBC W/AUTO DIFF WBC: CPT | Performed by: INTERNAL MEDICINE

## 2022-12-14 PROCEDURE — 87340 HEPATITIS B SURFACE AG IA: CPT | Performed by: INTERNAL MEDICINE

## 2022-12-14 PROCEDURE — 36415 COLL VENOUS BLD VENIPUNCTURE: CPT | Performed by: INTERNAL MEDICINE

## 2022-12-14 PROCEDURE — 80197 ASSAY OF TACROLIMUS: CPT | Performed by: INTERNAL MEDICINE

## 2022-12-14 PROCEDURE — 87517 HEPATITIS B DNA QUANT: CPT | Performed by: INTERNAL MEDICINE

## 2022-12-14 PROCEDURE — 80053 COMPREHEN METABOLIC PANEL: CPT | Performed by: INTERNAL MEDICINE

## 2022-12-14 PROCEDURE — 80321 ALCOHOLS BIOMARKERS 1OR 2: CPT | Performed by: INTERNAL MEDICINE

## 2022-12-15 LAB
HBV SURFACE AG SERPL QL IA: NORMAL
TACROLIMUS BLD-MCNC: 10.7 NG/ML (ref 5–15)

## 2022-12-17 LAB
CLINICAL BIOCHEMIST REVIEW: NORMAL
PLPETH BLD-MCNC: 10 NG/ML
POPETH BLD-MCNC: 15 NG/ML

## 2022-12-19 ENCOUNTER — TELEPHONE (OUTPATIENT)
Dept: TRANSPLANT | Facility: CLINIC | Age: 42
End: 2022-12-19
Payer: COMMERCIAL

## 2022-12-19 ENCOUNTER — PATIENT MESSAGE (OUTPATIENT)
Dept: TRANSPLANT | Facility: CLINIC | Age: 42
End: 2022-12-19
Payer: COMMERCIAL

## 2022-12-19 NOTE — TELEPHONE ENCOUNTER
Labs reviewed via portal and are stable.  Patient will repeat labs on 1/9/23 per protocol.      ----- Message from Quita Wynn MD sent at 12/18/2022  3:00 PM CST -----  The Labs are improved. I note prograf is higher than usual. No changes; repeat in one month - please let patient know.  
0-20/hypomobility present

## 2022-12-21 LAB
HBV DNA SERPL NAA+PROBE-ACNC: <10 IU/ML
HBV DNA SERPL NAA+PROBE-LOG IU: <1 LOG (10) IU/ML
HBV DNA SERPL QL NAA+PROBE: NOT DETECTED

## 2023-01-13 ENCOUNTER — TELEPHONE (OUTPATIENT)
Dept: TRANSPLANT | Facility: CLINIC | Age: 43
End: 2023-01-13
Payer: COMMERCIAL

## 2023-01-13 NOTE — LETTER
January 13, 2023    Garcia Cullen  [address] [facility]  [place] [address]          Dear Jose Berman:  MRN: [id number]    Your lab work was due to be drawn on 1/9/23.  We contacted your lab and were unable to get test results.  It is very important to get your labs drawn as scheduled.  We cannot monitor you for rejection, infections, or drug toxicity side effects without lab results.  Please call us at [phone] as soon as possible to let us know when you plan to have labs drawn.    Sincerely,    Marie Ramos, RN,BSN,CCTC    Your Liver Transplant Coordinator    [facility]  [facility] [place] [address]  [phone]

## 2023-02-03 ENCOUNTER — TELEPHONE (OUTPATIENT)
Dept: TRANSPLANT | Facility: CLINIC | Age: 43
End: 2023-02-03
Payer: COMMERCIAL

## 2023-02-03 DIAGNOSIS — F10.10 ALCOHOL ABUSE, CONTINUOUS: Primary | ICD-10-CM

## 2023-04-04 ENCOUNTER — TELEPHONE (OUTPATIENT)
Dept: TRANSPLANT | Facility: CLINIC | Age: 43
End: 2023-04-04
Payer: COMMERCIAL

## 2023-04-04 NOTE — LETTER
April 4, 2023    Garcia Cullen  [address] [facility]  [place] [address]          Dear Jose Berman:  MRN: [id number]    Your lab work was due to be drawn on 3/31/23.  We contacted your lab and were unable to get test results.  It is very important to get your labs drawn as scheduled.  We cannot monitor you for rejection, infections, or drug toxicity side effects without lab results.  Please call us at [phone] as soon as possible to let us know when you plan to have labs drawn.    Sincerely,    Marie Ramos, RN,BSN,CCTC    Your Liver Transplant Coordinator    [facility]  [facility] [place] [address]  [phone]

## 2023-04-04 NOTE — LETTER
April 4, 2023    Garcia Cullen  [address] [facility]  [place] [address]          Dear Jose Berman:  MRN: [id number]    Your lab work was due to be drawn on ***.  We contacted your lab and were unable to get test results.  It is very important to get your labs drawn as scheduled.  We cannot monitor you for rejection, infections, or drug toxicity side effects without lab results.  Please call us at [phone] as soon as possible to let us know when you plan to have labs drawn.    Sincerely,        Your Liver Transplant Coordinator    [facility]  [address] [place] [address]  [phone]

## 2023-05-02 ENCOUNTER — TELEPHONE (OUTPATIENT)
Dept: TRANSPLANT | Facility: CLINIC | Age: 43
End: 2023-05-02
Payer: COMMERCIAL

## 2023-06-28 ENCOUNTER — TELEPHONE (OUTPATIENT)
Dept: TRANSPLANT | Facility: CLINIC | Age: 43
End: 2023-06-28
Payer: COMMERCIAL

## 2023-07-27 ENCOUNTER — PATIENT MESSAGE (OUTPATIENT)
Dept: TRANSPLANT | Facility: CLINIC | Age: 43
End: 2023-07-27
Payer: MEDICAID

## 2023-08-02 ENCOUNTER — LAB VISIT (OUTPATIENT)
Dept: LAB | Facility: HOSPITAL | Age: 43
End: 2023-08-02
Attending: NURSE PRACTITIONER
Payer: COMMERCIAL

## 2023-08-02 DIAGNOSIS — F10.10 ALCOHOL ABUSE, CONTINUOUS: ICD-10-CM

## 2023-08-02 DIAGNOSIS — B19.10 HEPATITIS B VIRUS INFECTION IN CADAVERIC DONOR: ICD-10-CM

## 2023-08-02 DIAGNOSIS — Z00.5 HEPATITIS B VIRUS INFECTION IN CADAVERIC DONOR: ICD-10-CM

## 2023-08-02 DIAGNOSIS — Z94.4 S/P LIVER TRANSPLANT: ICD-10-CM

## 2023-08-02 LAB
ALBUMIN SERPL BCP-MCNC: 3.8 G/DL (ref 3.5–5.2)
ALP SERPL-CCNC: 210 U/L (ref 55–135)
ALT SERPL W/O P-5'-P-CCNC: 27 U/L (ref 10–44)
ANION GAP SERPL CALC-SCNC: 9 MMOL/L (ref 8–16)
AST SERPL-CCNC: 43 U/L (ref 10–40)
BASOPHILS # BLD AUTO: 0.04 K/UL (ref 0–0.2)
BASOPHILS NFR BLD: 0.7 % (ref 0–1.9)
BILIRUB SERPL-MCNC: 0.6 MG/DL (ref 0.1–1)
BUN SERPL-MCNC: 21 MG/DL (ref 6–20)
CALCIUM SERPL-MCNC: 9.7 MG/DL (ref 8.7–10.5)
CHLORIDE SERPL-SCNC: 104 MMOL/L (ref 95–110)
CO2 SERPL-SCNC: 26 MMOL/L (ref 23–29)
CREAT SERPL-MCNC: 1.2 MG/DL (ref 0.5–1.4)
DIFFERENTIAL METHOD: ABNORMAL
EOSINOPHIL # BLD AUTO: 0.4 K/UL (ref 0–0.5)
EOSINOPHIL NFR BLD: 5.9 % (ref 0–8)
ERYTHROCYTE [DISTWIDTH] IN BLOOD BY AUTOMATED COUNT: 15.9 % (ref 11.5–14.5)
EST. GFR  (NO RACE VARIABLE): 57.2 ML/MIN/1.73 M^2
GGT SERPL-CCNC: 142 U/L (ref 8–55)
GLUCOSE SERPL-MCNC: 87 MG/DL (ref 70–110)
HBV SURFACE AG SERPL QL IA: NORMAL
HCT VFR BLD AUTO: 35 % (ref 37–48.5)
HGB BLD-MCNC: 11.5 G/DL (ref 12–16)
IMM GRANULOCYTES # BLD AUTO: 0.01 K/UL (ref 0–0.04)
IMM GRANULOCYTES NFR BLD AUTO: 0.2 % (ref 0–0.5)
LYMPHOCYTES # BLD AUTO: 1.8 K/UL (ref 1–4.8)
LYMPHOCYTES NFR BLD: 28.9 % (ref 18–48)
MCH RBC QN AUTO: 28.5 PG (ref 27–31)
MCHC RBC AUTO-ENTMCNC: 32.9 G/DL (ref 32–36)
MCV RBC AUTO: 87 FL (ref 82–98)
MONOCYTES # BLD AUTO: 0.6 K/UL (ref 0.3–1)
MONOCYTES NFR BLD: 9.3 % (ref 4–15)
NEUTROPHILS # BLD AUTO: 3.4 K/UL (ref 1.8–7.7)
NEUTROPHILS NFR BLD: 55 % (ref 38–73)
NRBC BLD-RTO: 0 /100 WBC
PLATELET # BLD AUTO: 205 K/UL (ref 150–450)
PMV BLD AUTO: 9.8 FL (ref 9.2–12.9)
POTASSIUM SERPL-SCNC: 3.9 MMOL/L (ref 3.5–5.1)
PROT SERPL-MCNC: 7.7 G/DL (ref 6–8.4)
RBC # BLD AUTO: 4.03 M/UL (ref 4–5.4)
SODIUM SERPL-SCNC: 139 MMOL/L (ref 136–145)
WBC # BLD AUTO: 6.13 K/UL (ref 3.9–12.7)

## 2023-08-02 PROCEDURE — 82977 ASSAY OF GGT: CPT | Performed by: INTERNAL MEDICINE

## 2023-08-02 PROCEDURE — 85025 COMPLETE CBC W/AUTO DIFF WBC: CPT | Performed by: INTERNAL MEDICINE

## 2023-08-02 PROCEDURE — 80197 ASSAY OF TACROLIMUS: CPT | Performed by: INTERNAL MEDICINE

## 2023-08-02 PROCEDURE — 87340 HEPATITIS B SURFACE AG IA: CPT | Performed by: INTERNAL MEDICINE

## 2023-08-02 PROCEDURE — 80321 ALCOHOLS BIOMARKERS 1OR 2: CPT | Performed by: INTERNAL MEDICINE

## 2023-08-02 PROCEDURE — 87517 HEPATITIS B DNA QUANT: CPT | Performed by: INTERNAL MEDICINE

## 2023-08-02 PROCEDURE — 80053 COMPREHEN METABOLIC PANEL: CPT | Performed by: INTERNAL MEDICINE

## 2023-08-03 LAB — TACROLIMUS BLD-MCNC: 5 NG/ML (ref 5–15)

## 2023-08-04 ENCOUNTER — LAB VISIT (OUTPATIENT)
Dept: LAB | Facility: HOSPITAL | Age: 43
End: 2023-08-04
Attending: INTERNAL MEDICINE
Payer: MEDICAID

## 2023-08-04 ENCOUNTER — PATIENT MESSAGE (OUTPATIENT)
Dept: TRANSPLANT | Facility: CLINIC | Age: 43
End: 2023-08-04

## 2023-08-04 ENCOUNTER — OFFICE VISIT (OUTPATIENT)
Dept: TRANSPLANT | Facility: CLINIC | Age: 43
End: 2023-08-04
Payer: COMMERCIAL

## 2023-08-04 VITALS
OXYGEN SATURATION: 100 % | SYSTOLIC BLOOD PRESSURE: 204 MMHG | BODY MASS INDEX: 20.66 KG/M2 | WEIGHT: 131.63 LBS | DIASTOLIC BLOOD PRESSURE: 113 MMHG | HEART RATE: 80 BPM | HEIGHT: 67 IN | TEMPERATURE: 98 F | RESPIRATION RATE: 19 BRPM

## 2023-08-04 DIAGNOSIS — Z94.4 S/P LIVER TRANSPLANT: ICD-10-CM

## 2023-08-04 DIAGNOSIS — T86.49 BILIARY STRICTURE OF TRANSPLANTED LIVER: ICD-10-CM

## 2023-08-04 DIAGNOSIS — F33.41 RECURRENT MAJOR DEPRESSIVE DISORDER, IN PARTIAL REMISSION: ICD-10-CM

## 2023-08-04 DIAGNOSIS — K83.1 BILIARY STRICTURE OF TRANSPLANTED LIVER: ICD-10-CM

## 2023-08-04 DIAGNOSIS — T86.49 OTHER COMPLICATION OF LIVER TRANSPLANT: ICD-10-CM

## 2023-08-04 DIAGNOSIS — K04.7 DENTAL INFECTION: Primary | ICD-10-CM

## 2023-08-04 DIAGNOSIS — R79.89 ELEVATED LIVER FUNCTION TESTS: ICD-10-CM

## 2023-08-04 DIAGNOSIS — Z29.89 PROPHYLACTIC IMMUNOTHERAPY: ICD-10-CM

## 2023-08-04 LAB
HCV AB SERPL QL IA: NORMAL
INR PPP: 1 (ref 0.8–1.2)
PROTHROMBIN TIME: 10.6 SEC (ref 9–12.5)

## 2023-08-04 PROCEDURE — 99999 PR PBB SHADOW E&M-EST. PATIENT-LVL V: CPT | Mod: PBBFAC,,, | Performed by: INTERNAL MEDICINE

## 2023-08-04 PROCEDURE — 99999 PR PBB SHADOW E&M-EST. PATIENT-LVL V: ICD-10-PCS | Mod: PBBFAC,,, | Performed by: INTERNAL MEDICINE

## 2023-08-04 PROCEDURE — 1159F MED LIST DOCD IN RCRD: CPT | Mod: CPTII,,, | Performed by: INTERNAL MEDICINE

## 2023-08-04 PROCEDURE — 3077F SYST BP >= 140 MM HG: CPT | Mod: CPTII,,, | Performed by: INTERNAL MEDICINE

## 2023-08-04 PROCEDURE — 3008F PR BODY MASS INDEX (BMI) DOCUMENTED: ICD-10-PCS | Mod: CPTII,,, | Performed by: INTERNAL MEDICINE

## 2023-08-04 PROCEDURE — 85610 PROTHROMBIN TIME: CPT | Performed by: INTERNAL MEDICINE

## 2023-08-04 PROCEDURE — 99214 PR OFFICE/OUTPT VISIT, EST, LEVL IV, 30-39 MIN: ICD-10-PCS | Mod: S$PBB,,, | Performed by: INTERNAL MEDICINE

## 2023-08-04 PROCEDURE — 80321 ALCOHOLS BIOMARKERS 1OR 2: CPT | Performed by: INTERNAL MEDICINE

## 2023-08-04 PROCEDURE — 87522 HEPATITIS C REVRS TRNSCRPJ: CPT | Performed by: INTERNAL MEDICINE

## 2023-08-04 PROCEDURE — 3008F BODY MASS INDEX DOCD: CPT | Mod: CPTII,,, | Performed by: INTERNAL MEDICINE

## 2023-08-04 PROCEDURE — 86803 HEPATITIS C AB TEST: CPT | Performed by: INTERNAL MEDICINE

## 2023-08-04 PROCEDURE — 1160F RVW MEDS BY RX/DR IN RCRD: CPT | Mod: CPTII,,, | Performed by: INTERNAL MEDICINE

## 2023-08-04 PROCEDURE — 80307 DRUG TEST PRSMV CHEM ANLYZR: CPT | Performed by: INTERNAL MEDICINE

## 2023-08-04 PROCEDURE — 99214 OFFICE O/P EST MOD 30 MIN: CPT | Mod: S$PBB,,, | Performed by: INTERNAL MEDICINE

## 2023-08-04 PROCEDURE — 3077F PR MOST RECENT SYSTOLIC BLOOD PRESSURE >= 140 MM HG: ICD-10-PCS | Mod: CPTII,,, | Performed by: INTERNAL MEDICINE

## 2023-08-04 PROCEDURE — 1159F PR MEDICATION LIST DOCUMENTED IN MEDICAL RECORD: ICD-10-PCS | Mod: CPTII,,, | Performed by: INTERNAL MEDICINE

## 2023-08-04 PROCEDURE — 36415 COLL VENOUS BLD VENIPUNCTURE: CPT | Mod: PN | Performed by: INTERNAL MEDICINE

## 2023-08-04 PROCEDURE — 99215 OFFICE O/P EST HI 40 MIN: CPT | Mod: PBBFAC,PN | Performed by: INTERNAL MEDICINE

## 2023-08-04 PROCEDURE — 1160F PR REVIEW ALL MEDS BY PRESCRIBER/CLIN PHARMACIST DOCUMENTED: ICD-10-PCS | Mod: CPTII,,, | Performed by: INTERNAL MEDICINE

## 2023-08-04 PROCEDURE — 3080F PR MOST RECENT DIASTOLIC BLOOD PRESSURE >= 90 MM HG: ICD-10-PCS | Mod: CPTII,,, | Performed by: INTERNAL MEDICINE

## 2023-08-04 PROCEDURE — 3080F DIAST BP >= 90 MM HG: CPT | Mod: CPTII,,, | Performed by: INTERNAL MEDICINE

## 2023-08-04 RX ORDER — TACROLIMUS 1 MG/1
6 CAPSULE ORAL EVERY 12 HOURS
Qty: 360 CAPSULE | Refills: 5 | Status: SHIPPED | OUTPATIENT
Start: 2023-08-04 | End: 2024-03-05 | Stop reason: SDUPTHER

## 2023-08-04 RX ORDER — NAPROXEN SODIUM 220 MG/1
81 TABLET, FILM COATED ORAL DAILY
Qty: 90 TABLET | Refills: 3 | Status: SHIPPED | OUTPATIENT
Start: 2023-08-04 | End: 2023-10-18

## 2023-08-04 RX ORDER — DULOXETIN HYDROCHLORIDE 60 MG/1
CAPSULE, DELAYED RELEASE ORAL
Qty: 60 CAPSULE | Refills: 11 | Status: SHIPPED | OUTPATIENT
Start: 2023-08-04 | End: 2023-10-18

## 2023-08-04 RX ORDER — PANTOPRAZOLE SODIUM 40 MG/1
40 TABLET, DELAYED RELEASE ORAL DAILY
Qty: 30 TABLET | Refills: 11 | Status: SHIPPED | OUTPATIENT
Start: 2023-08-04 | End: 2024-03-05 | Stop reason: SDUPTHER

## 2023-08-04 RX ORDER — URSODIOL 500 MG/1
500 TABLET, FILM COATED ORAL 2 TIMES DAILY
Qty: 60 TABLET | Refills: 5 | Status: SHIPPED | OUTPATIENT
Start: 2023-08-04 | End: 2024-03-05 | Stop reason: SDUPTHER

## 2023-08-04 RX ORDER — DOXYCYCLINE 100 MG/1
100 CAPSULE ORAL
Qty: 1 CAPSULE | Refills: 2 | Status: SHIPPED | OUTPATIENT
Start: 2023-08-04 | End: 2023-10-18

## 2023-08-04 RX ORDER — LAMIVUDINE 150 MG/1
150 TABLET, FILM COATED ORAL DAILY
Qty: 30 TABLET | Refills: 5 | Status: SHIPPED | OUTPATIENT
Start: 2023-08-04 | End: 2024-03-05 | Stop reason: SDUPTHER

## 2023-08-04 NOTE — PROGRESS NOTES
Transplant Hepatology  Liver Transplant Recipient Follow-up    Transplant Date: 2015  UNOS Native Liver Dx: Alcoholic Cirrhosis    Jose is here for follow up of her liver transplant.    ORGAN: LIVER  Whole or Partial: whole liver  Donor Type:  - brain death  CDC High Risk: no  Donor CMV Status: positive  Donor HCV Status: negative  Donor HBcAb: positive  Biliary Anastomosis: end to end  Arterial Anatomy: standard  IVC reconstruction: end to end ivc  Portal vein status: patent    Mrs. Berman is a 42 year old female s/p OLTX on 8/26/15 2/2 alcoholic cirrhosis. Course complicated course with SHIVAM requiring HD, respiratory failure requiring trach on chronic immunosuppression with prograf    Subjective:     HPI: Jose is now 7 years and 11 months years post liver transplant. She ran out of all of her meds 2 days ago.     After liver transplant when she was discharged home, she was hospitalized x 2 for narcotic overdose since transplant and turned down an offer of rehab. We were told that she received narcotics from 5-6 MDs. She previously denied this. Post liver transplant a PETH test was checked and it came back elevated. When I last saw her she had been dx with RA and was put on Humira every 2 weeks. She is no longer on this medication.    Interval History:  She continues to miss multiple lab appts. Last visit with me was 12/3/21. BP very high today. She continues to smoke cigarettes and peth was positive . She remains concerned that she may have contrasted HCV. At her last visit she was with a different partner who was known to have HCV. Viral test was negative for HCV. She is asking that she be screened for HCV.    Allograft function 23: 23: ALT 27, AST 43, ALKP 210, Tbil 0.6, prograf 5.0, creat 1.2  IS: prograf monotherapy    MRCP 21: Moderate extrahepatic biliary ductal dilatation with suspected stricture of the proximal common bile duct.  Further evaluation with ERCP as deemed  clinically necessary  ERCP 3/25/21: aborted since had gastric bypass  Liver biopsy: ordered but not done    Health maintenance:  Dermatology: hx skin cancer x 2; no recent evaluation   Mammogram: due 2023  Pap: needs to be done  Bone density 2016: below expected range    Review of Systems   Constitutional: Negative.    HENT: Negative.     Eyes: Negative.    Respiratory: Negative.     Cardiovascular: Negative.    Genitourinary: Negative.    Musculoskeletal: Negative.    Neurological: Negative.    Psychiatric/Behavioral: Negative.         Objective:     Vitals:    08/04/23 0939   BP: (!) 204/113   Pulse: 80   Resp: 19   Temp: 98.1 °F (36.7 °C)       Physical Exam  Vitals reviewed.   Constitutional:       Appearance: She is well-developed.   HENT:      Head: Normocephalic and atraumatic.   Eyes:      General: No scleral icterus.     Conjunctiva/sclera: Conjunctivae normal.      Pupils: Pupils are equal, round, and reactive to light.   Neck:      Thyroid: No thyromegaly.   Cardiovascular:      Rate and Rhythm: Normal rate and regular rhythm.      Heart sounds: Normal heart sounds.   Pulmonary:      Effort: Pulmonary effort is normal.      Breath sounds: Normal breath sounds. No rales.   Abdominal:      General: Bowel sounds are normal. There is no distension.      Palpations: Abdomen is soft. There is no mass.   Musculoskeletal:         General: Normal range of motion.      Cervical back: Normal range of motion and neck supple.   Skin:     General: Skin is warm and dry.      Findings: No rash.   Neurological:      Mental Status: She is alert and oriented to person, place, and time.       Lab Results   Component Value Date    BILITOT 0.6 08/02/2023    AST 43 (H) 08/02/2023    ALT 27 08/02/2023    ALKPHOS 210 (H) 08/02/2023    CREATININE 1.2 08/02/2023    ALBUMIN 3.8 08/02/2023     Lab Results   Component Value Date    WBC 6.13 08/02/2023    HGB 11.5 (L) 08/02/2023    HCT 35.0 (L) 08/02/2023    HCT 29 (L) 10/15/2015      08/02/2023     Lab Results   Component Value Date    TACROLIMUS 5.0 08/02/2023       Assessment/Plan:   The patient is now 7 years and 11 months post liver transplant. Current recommendations:  1. Complication of liver transplant: previously noted dilated ducts suggestive of obstruction. MRCP 2021 suggests obsturction; unable to do ERCP; PTC deferred; Had defered PTC since liver numbers and pruritus improved on jamari and she has significant transportation/compliance issues which would make a PTC and maintenance required for PTC difficult.   2. S/p liver transplant and control of IS: prograf target 4-7. Still recommend liver biopsy; pt will think about doing a liver biopsy  3. History of skin cancer: recommend dermatologic follow up   4. Bone thinning: repeat bone density now  5. Mammogram and pap test- repeat mammogram 2023; due now for pap test;  Colorectal ca screening now  6. Cigarette smoking: quit smoking  7. HBcAb+ donor: cotninue lamivudine; labs per protocol  8. HCV+ partner: screen for HCV again today  9. Elevated BP: to ER after this appt    Return 1 year    MD AJIT Fernandez Patient Status  Functional Status: 70% - Cares for self: unable to carry on normal activity or active work  Physical Capacity: Limited Mobility  . . .

## 2023-08-04 NOTE — PATIENT INSTRUCTIONS
Will refill meds  To ER for BP  Liver biopsy  Hx skin cancer- needs to see deramtology  doxycycline 1 hour before dental procedures  Check INR today; check hepatitis C today  Mammogram- due 2023  Pap test- see gynecologist  Colonoscopy now

## 2023-08-04 NOTE — LETTER
August 4, 2023        Blair Pedro  [address]  INTERNAL [facility] CLINIC  [place] [address]  Phone: [phone]  Fax: [phone]     Kin Chambers  [address] [facility] [place] [address]  Phone: [phone]  Fax: [phone]             \[facility]\"" - Liver Transplant  [address] [address]  Phone: [phone]  Fax: [phone]   Patient: Jose Berman   MR Number: [id number]   YOB: 1980   Date of Visit: 8/4/2023       Dear Dr. Kin Chambers, Blair Pedro    Thank you for referring Jose Berman to me for evaluation. Attached you will find relevant portions of my assessment and plan of care.    If you have questions, please do not hesitate to call me. I look forward to following Jose Berman along with you.    Sincerely,    Quita Wynn MD    Enclosure    If you would like to receive this communication electronically, please contact [email or web address] or [phone] to request [employer] access.    [employer] is a tool which provides read-only access to select patient information with whom you have a relationship. Its easy to use and provides real time access to review your patients record including encounter summaries, notes, results, and demographic information.    If you feel you have received this communication in error or would no longer like to receive these types of communications, please e-mail [email or web address]

## 2023-08-05 LAB
CLINICAL BIOCHEMIST REVIEW: NORMAL
PLPETH BLD-MCNC: <10 NG/ML
POPETH BLD-MCNC: <10 NG/ML

## 2023-08-07 ENCOUNTER — TELEPHONE (OUTPATIENT)
Dept: TRANSPLANT | Facility: CLINIC | Age: 43
End: 2023-08-07
Payer: MEDICAID

## 2023-08-07 DIAGNOSIS — B19.10 HEPATITIS B VIRUS INFECTION IN CADAVERIC DONOR: ICD-10-CM

## 2023-08-07 DIAGNOSIS — Z94.4 S/P LIVER TRANSPLANT: Primary | ICD-10-CM

## 2023-08-07 DIAGNOSIS — Z00.5 HEPATITIS B VIRUS INFECTION IN CADAVERIC DONOR: ICD-10-CM

## 2023-08-07 DIAGNOSIS — M81.0 OSTEOPOROSIS, UNSPECIFIED OSTEOPOROSIS TYPE, UNSPECIFIED PATHOLOGICAL FRACTURE PRESENCE: ICD-10-CM

## 2023-08-07 LAB
AMPHETAMINES SERPL QL: NEGATIVE
BARBITURATES SERPL QL SCN: NEGATIVE
BENZODIAZ SERPL QL SCN: NEGATIVE
BZE SERPL QL: NEGATIVE
CARBOXYTHC SERPL QL SCN: NEGATIVE
ETHANOL SERPL QL SCN: NEGATIVE
HBV DNA SERPL NAA+PROBE-ACNC: <10 IU/ML
HBV DNA SERPL NAA+PROBE-LOG IU: <1 LOG (10) IU/ML
HBV DNA SERPL QL NAA+PROBE: NOT DETECTED
HCV RNA SERPL QL NAA+PROBE: NOT DETECTED
HCV RNA SPEC NAA+PROBE-ACNC: <12 IU/ML
METHADONE SERPL QL SCN: NEGATIVE
OPIATES SERPL QL SCN: NEGATIVE
PCP SERPL QL SCN: NEGATIVE
PROPOXYPH SERPL QL: NEGATIVE

## 2023-08-07 NOTE — TELEPHONE ENCOUNTER
Letter sent to pt      ----- Message from Quita Wynn MD sent at 8/4/2023  3:22 PM CDT -----  INR normal. Plts are ok. You can give her a note clearing her for dental procedures- please let patient know.

## 2023-08-07 NOTE — TELEPHONE ENCOUNTER
Labs reviewed and are stable, continue q 3 months. Letter sent.     ----- Message from Quita Wynn MD sent at 8/3/2023  8:10 AM CDT -----  No changes

## 2023-08-07 NOTE — LETTER
August 7, 2023    Jose Berman  [address] [facility] [place] [address]          Dear Garciajesusita Berman:  MRN: [id number]    This is a follow up to your recent labs, your lab results were stable.  There are no medicine changes.  Please have your labs drawn again on 10/30/23.      If you cannot have your labs drawn on the scheduled date, it is your responsibility to call the transplant department to reschedule.  Please call [phone] and ask to speak to Rohini ROBLES   for all scheduling requests.     Sincerely,    Marie Ramos, RN,BSN,CCTC     Your Liver Transplant Coordinator    [facility]  [facility] [place] [address]  [phone]

## 2023-08-07 NOTE — LETTER
August 7, 2023      [facility]  [address]  [facility] [address]  Phone: [phone]       Patient: Jose Berman   YOB: 1980  Date of Visit: 08/07/2023    To Whom It May Concern:    Campbell Berman  was at [facility] on 08/07/2023. The patient is cleared to have dental procedures and has been given a prophylactic antibiotic to take prior. If you have any questions or concerns, or if I can be of further assistance, please do not hesitate to contact me.    Sincerely,      Dr. Quita Wynn MD, PhD

## 2023-08-08 LAB
CLINICAL BIOCHEMIST REVIEW: NORMAL
PLPETH BLD-MCNC: <10 NG/ML
POPETH BLD-MCNC: <10 NG/ML

## 2023-10-11 DIAGNOSIS — Z12.31 OTHER SCREENING MAMMOGRAM: ICD-10-CM

## 2023-10-16 ENCOUNTER — PATIENT MESSAGE (OUTPATIENT)
Dept: ADMINISTRATIVE | Facility: HOSPITAL | Age: 43
End: 2023-10-16
Payer: MEDICAID

## 2023-10-18 ENCOUNTER — OFFICE VISIT (OUTPATIENT)
Dept: FAMILY MEDICINE | Facility: CLINIC | Age: 43
End: 2023-10-18
Payer: COMMERCIAL

## 2023-10-18 DIAGNOSIS — Z79.899 HIGH RISK MEDICATION USE: ICD-10-CM

## 2023-10-18 DIAGNOSIS — Z13.6 ENCOUNTER FOR LIPID SCREENING FOR CARDIOVASCULAR DISEASE: ICD-10-CM

## 2023-10-18 DIAGNOSIS — I10 ESSENTIAL HYPERTENSION: Primary | ICD-10-CM

## 2023-10-18 DIAGNOSIS — Z94.4 S/P LIVER TRANSPLANT: ICD-10-CM

## 2023-10-18 DIAGNOSIS — J30.2 SEASONAL ALLERGIES: ICD-10-CM

## 2023-10-18 DIAGNOSIS — Z13.1 SCREENING FOR DIABETES MELLITUS (DM): ICD-10-CM

## 2023-10-18 DIAGNOSIS — Z13.220 ENCOUNTER FOR LIPID SCREENING FOR CARDIOVASCULAR DISEASE: ICD-10-CM

## 2023-10-18 PROCEDURE — 1159F PR MEDICATION LIST DOCUMENTED IN MEDICAL RECORD: ICD-10-PCS | Mod: CPTII,95,, | Performed by: NURSE PRACTITIONER

## 2023-10-18 PROCEDURE — 99213 OFFICE O/P EST LOW 20 MIN: CPT | Mod: 95,,, | Performed by: NURSE PRACTITIONER

## 2023-10-18 PROCEDURE — 1159F MED LIST DOCD IN RCRD: CPT | Mod: CPTII,95,, | Performed by: NURSE PRACTITIONER

## 2023-10-18 PROCEDURE — 99213 PR OFFICE/OUTPT VISIT, EST, LEVL III, 20-29 MIN: ICD-10-PCS | Mod: 95,,, | Performed by: NURSE PRACTITIONER

## 2023-10-18 PROCEDURE — 1160F RVW MEDS BY RX/DR IN RCRD: CPT | Mod: CPTII,95,, | Performed by: NURSE PRACTITIONER

## 2023-10-18 PROCEDURE — 1160F PR REVIEW ALL MEDS BY PRESCRIBER/CLIN PHARMACIST DOCUMENTED: ICD-10-PCS | Mod: CPTII,95,, | Performed by: NURSE PRACTITIONER

## 2023-10-18 RX ORDER — LISINOPRIL AND HYDROCHLOROTHIAZIDE 10; 12.5 MG/1; MG/1
1 TABLET ORAL DAILY
Qty: 30 TABLET | Refills: 0 | Status: SHIPPED | OUTPATIENT
Start: 2023-10-18 | End: 2024-03-21 | Stop reason: SDUPTHER

## 2023-10-18 RX ORDER — ALBUTEROL SULFATE 90 UG/1
2 AEROSOL, METERED RESPIRATORY (INHALATION) EVERY 6 HOURS PRN
Qty: 18 G | Refills: 0 | Status: SHIPPED | OUTPATIENT
Start: 2023-10-18 | End: 2024-03-21 | Stop reason: SDUPTHER

## 2023-10-18 NOTE — PROGRESS NOTES
Subjective:       Patient ID: Jose Berman is a 43 y.o. female.    Chief Complaint: No chief complaint on file.  ..The patient location is: [place]  The chief complaint leading to consultation is: untreated HTN    Visit type: audiovisual    Face to Face time with patient: 15 minutes of total time spent on the encounter, which includes face to face time and non-face to face time preparing to see the patient (eg, review of tests), Obtaining and/or reviewing separately obtained history, Documenting clinical information in the electronic or other health record, Independently interpreting results (not separately reported) and communicating results to the patient/family/caregiver, or Care coordination (not separately reported).     Each patient to whom he or she provides medical services by telemedicine is:  (1) informed of the relationship between the physician and patient and the respective role of any other health care provider with respect to management of the patient; and (2) notified that he or she may decline to receive medical services by telemedicine and may withdraw from such care at any time.    Answers submitted by the patient for this visit:  High Blood Pressure Questionnaire (Submitted on 10/18/2023)  Chief Complaint: Hypertension  Chronicity: chronic  Onset: more than 1 year ago  Progression since onset: gradually worsening  Condition status: uncontrolled  anxiety: Yes  blurred vision: Yes  chest pain: No  headaches: Yes  malaise/fatigue: Yes  neck pain: Yes  orthopnea: No  palpitations: Yes  peripheral edema: Yes  PND: No  shortness of breath: Yes  sweats: No  CAD risks: family history, post-menopausal state, smoking/tobacco exposure, stress  Compliance problems: no compliance problems  Past treatments: diuretics  Improvement on treatment: no improvement    Notes:   -  The pt is a 44 y/o female that presents for uncontrolled HTN yet not seen by this NP since 5/2020. H/ liver transplant 11 yrs ago thus  chart viewed with documented HTN and told to see PCP. Has upcoming in person appt but did not want to go to ER.  -     Past Medical History:   Diagnosis Date    Alcoholic liver failure 2014    Underwent transplant     Anxiety 1999    Bipolar 1 disorder, depressed 2004    Bipolar 1 disorder, depressed     Informed from pt via Patient Portal     CMV (cytomegalovirus)     Donor CMV Status: positive    Elevated liver function tests 8/4/2023    ETOH abuse 08/27/2019    admits to social drinking    Heart attack 2015    s/p transplant complication and extended hospitalization     Hepatic steatosis 2014    Hepatitis B     Donor HBcAb: positive- taking lamivudine    Hypertension 2009    Kidney failure 2015    s/p transplant complication and extended hospitalization     Lumbago 1999    Macrocytic anemia 2014    Narcotic overdose 04/2018    hospitalized x 2- See 4/30/2018 office note    Osteoporosis 08/2016    Rheumatoid arthritis 5/1/2017    Skin cancer 1999, 2002, 2016    Stroke 2015    s/p transplant complication and extended hospitalization        Past Surgical History:   Procedure Laterality Date    AUGMENTATION OF BREAST Bilateral 2014    saline    BACK SURGERY  2007    L4-L5 - Laminectomy    BELT ABDOMINOPLASTY  2013    BLADDER SUSPENSION  2004    BREAST SURGERY  2014    Breast Augmentation    CHOLECYSTECTOMY  2009    DILATION AND CURETTAGE OF UTERUS      3 times     ERCP N/A 3/25/2021    Procedure: ERCP (ENDOSCOPIC RETROGRADE CHOLANGIOPANCREATOGRAPHY);  Surgeon: Dewayne Vasquez MD;  Location: [facility] ([address] [place] [address]);  Service: Endoscopy;  Laterality: N/A;  Covid-19 test 3/22/21 at [facility]    GASTRIC BYPASS  2009, 2010    HYSTERECTOMY  2008    LIVER TRANSPLANT  08/26/2015    Complication of liver transplant: dilated ducts suggestive of obstruction    OOPHORECTOMY      SKIN GRAFT  2013, 2012        Social History     Socioeconomic History    Marital status:     Number of children: 3    Highest education level:  Bachelor's degree (e.g., BA, AB, BS)   Occupational History    Occupation: disbility -transplant    Tobacco Use    Smoking status: Every Day     Current packs/day: 0.50     Average packs/day: 0.5 packs/day for 2.0 years (1.0 ttl pk-yrs)     Types: Cigarettes    Smokeless tobacco: Never   Substance and Sexual Activity    Alcohol use: Yes     Comment: Rarely - once monthly    Drug use: Not Currently     Types: Marijuana, Amphetamines    Sexual activity: Yes     Partners: Male     Social Determinants of Health     Financial Resource Strain: High Risk (10/18/2023)    Overall Financial Resource Strain (CARDIA)     Difficulty of Paying Living Expenses: Very hard   Food Insecurity: Food Insecurity Present (10/18/2023)    Hunger Vital Sign     Worried About Running Out of Food in the Last Year: Often true     Ran Out of Food in the Last Year: Often true   Transportation Needs: Unmet Transportation Needs (10/18/2023)    PRAPARE - Transportation     Lack of Transportation (Medical): Yes     Lack of Transportation (Non-Medical): Yes   Physical Activity: Insufficiently Active (10/18/2023)    Exercise Vital Sign     Days of Exercise per Week: 2 days     Minutes of Exercise per Session: 60 min   Stress: Stress Concern Present (10/18/2023)    [place] [employer] - Occupational Stress Questionnaire     Feeling of Stress : Very much   Social Connections: Unknown (10/18/2023)    Social Connection and Isolation Panel [NHANES]     Frequency of Communication with Friends and Family: Once a week     Frequency of Social Gatherings with Friends and Family: Never     Active Member of Clubs or Organizations: No     Attends Club or Organization Meetings: Patient refused     Marital Status:    Housing Stability: High Risk (10/18/2023)    Housing Stability Vital Sign     Unable to Pay for Housing in the Last Year: Yes     Number of Places Lived in the Last Year: 18     Unstable Housing in the Last Year: Yes        Family History   Problem Relation Age of Onset    Alcohol abuse Father     Depression Father     Arthritis Father     Colon cancer Father 70    Depression Mother     Arthritis Mother     Breast cancer Neg Hx     Ovarian cancer Neg Hx        Review of patient's allergies indicates:   Allergen Reactions    Penicillins Hives and Shortness Of Breath          Current Outpatient Medications:     albuterol (PROAIR HFA) 90 mcg/actuation inhaler, Inhale 2 puffs into the lungs every 6 (six) hours as needed for Wheezing., Disp: 18 g, Rfl: 0    lamiVUDine (EPIVIR) 150 MG Tab, Take 1 tablet (150 mg total) by mouth once daily., Disp: 30 tablet, Rfl: 5    lisinopriL-hydrochlorothiazide (PRINZIDE,ZESTORETIC) 10-12.5 mg per tablet, Take 1 tablet by mouth once daily., Disp: 30 tablet, Rfl: 0    multivitamin (THERAGRAN) tablet, Take 1 tablet by mouth once daily., Disp: 90 tablet, Rfl: 3    pantoprazole (PROTONIX) 40 MG tablet, Take 1 tablet (40 mg total) by mouth once daily., Disp: 30 tablet, Rfl: 11    tacrolimus (PROGRAF) 1 MG Cap, Take 6 capsules (6 mg total) by mouth every 12 (twelve) hours., Disp: 360 capsule, Rfl: 5    ursodioL (ACTIGALL) 500 MG tablet, Take 1 tablet (500 mg total) by mouth 2 (two) times daily., Disp: 60 tablet, Rfl: 5    Hypertension  This is a chronic problem. The current episode started more than 1 year ago. The problem has been gradually worsening since onset. The problem is uncontrolled. Associated symptoms include anxiety, blurred vision, headaches, malaise/fatigue, neck pain, palpitations, peripheral edema and shortness of breath. Pertinent negatives include no chest pain, orthopnea, PND or sweats. Risk factors for coronary artery disease include family history, post-menopausal state, smoking/tobacco exposure and stress. Past treatments include diuretics. The current treatment provides no improvement. There are no compliance problems.      Review of Systems   Constitutional:  Positive for  malaise/fatigue and unexpected weight change. Negative for chills, diaphoresis and fever.   HENT:  Negative for dental problem and trouble swallowing.    Eyes:  Positive for blurred vision. Negative for visual disturbance.   Respiratory:  Positive for shortness of breath. Negative for wheezing.    Cardiovascular:  Positive for palpitations. Negative for chest pain, orthopnea and PND.   Gastrointestinal:  Negative for abdominal pain, constipation, diarrhea, nausea and vomiting.   Endocrine: Negative for polydipsia and polyuria.   Genitourinary:  Negative for dysuria.   Musculoskeletal:  Positive for neck pain. Negative for joint swelling.   Skin:  Negative for rash.   Neurological:  Positive for headaches. Negative for syncope.   Psychiatric/Behavioral:  Negative for agitation and confusion.        Objective:      Physical Exam  Constitutional:       General: She is not in acute distress.     Appearance: She is ill-appearing.      Comments: Looks thin   HENT:      Head: Normocephalic.   Eyes:      Conjunctiva/sclera: Conjunctivae normal.   Pulmonary:      Effort: Pulmonary effort is normal.   Musculoskeletal:         General: Normal range of motion.      Cervical back: Normal range of motion.   Skin:     Coloration: Skin is not jaundiced or pale.   Neurological:      Mental Status: She is alert and oriented to person, place, and time.   Psychiatric:         Attention and Perception: Attention normal.         Mood and Affect: Mood is anxious. Affect is labile.         Speech: Speech is rapid and pressured.         Behavior: Behavior is hyperactive. Behavior is cooperative.         Thought Content: Thought content normal.         Cognition and Memory: Cognition and memory normal.         Judgment: Judgment normal.         Assessment:       1. Essential hypertension    2. Seasonal allergies    3. S/P liver transplant 8/26 for ETOH cirrhosis    4. Screening for diabetes mellitus (DM)    5. High risk medication use    6.  Encounter for lipid screening for cardiovascular disease        Plan:     1- discussed NCNS rate  2- discussed peth was positive   3- encouraged to resume cymbalta  4- start lisinopril/HCTZ  5- keep BP log and bring to Nov in person appt  6- labs ordered to be linked to 10/30/23 appt  -     Essential hypertension  -     lisinopriL-hydrochlorothiazide (PRINZIDE,ZESTORETIC) 10-12.5 mg per tablet; Take 1 tablet by mouth once daily.  Dispense: 30 tablet; Refill: 0  -     Lipid Panel; Future; Expected date: 10/18/2023  -     TSH; Future; Expected date: 10/18/2023  -     T4, Free; Future; Expected date: 10/18/2023  -     Hemoglobin A1C; Future; Expected date: 10/18/2023  -     Comprehensive Metabolic Panel; Future; Expected date: 10/18/2023  -     CBC Auto Differential; Future; Expected date: 10/18/2023    Seasonal allergies  -     albuterol (PROAIR HFA) 90 mcg/actuation inhaler; Inhale 2 puffs into the lungs every 6 (six) hours as needed for Wheezing.  Dispense: 18 g; Refill: 0    S/P liver transplant 8/26 for ETOH cirrhosis  -     Lipid Panel; Future; Expected date: 10/18/2023  -     TSH; Future; Expected date: 10/18/2023  -     T4, Free; Future; Expected date: 10/18/2023  -     Hemoglobin A1C; Future; Expected date: 10/18/2023  -     Comprehensive Metabolic Panel; Future; Expected date: 10/18/2023  -     CBC Auto Differential; Future; Expected date: 10/18/2023    Screening for diabetes mellitus (DM)  -     Lipid Panel; Future; Expected date: 10/18/2023  -     TSH; Future; Expected date: 10/18/2023  -     T4, Free; Future; Expected date: 10/18/2023  -     Hemoglobin A1C; Future; Expected date: 10/18/2023  -     Comprehensive Metabolic Panel; Future; Expected date: 10/18/2023  -     CBC Auto Differential; Future; Expected date: 10/18/2023    High risk medication use  -     Lipid Panel; Future; Expected date: 10/18/2023  -     TSH; Future; Expected date: 10/18/2023  -     T4, Free; Future; Expected date: 10/18/2023  -      Hemoglobin A1C; Future; Expected date: 10/18/2023  -     Comprehensive Metabolic Panel; Future; Expected date: 10/18/2023  -     CBC Auto Differential; Future; Expected date: 10/18/2023    Encounter for lipid screening for cardiovascular disease  -     Lipid Panel; Future; Expected date: 10/18/2023  -     TSH; Future; Expected date: 10/18/2023  -     T4, Free; Future; Expected date: 10/18/2023  -     Hemoglobin A1C; Future; Expected date: 10/18/2023  -     Comprehensive Metabolic Panel; Future; Expected date: 10/18/2023  -     CBC Auto Differential; Future; Expected date: 10/18/2023        Risks, benefits, and side effects were discussed with the patient. All questions were answered to the fullest satisfaction of the patient, and pt verbalized understanding and agreement to treatment plan. Pt was to call with any new or worsening symptoms, or present to the ER.

## 2023-10-30 ENCOUNTER — LAB VISIT (OUTPATIENT)
Dept: LAB | Facility: HOSPITAL | Age: 43
End: 2023-10-30
Attending: INTERNAL MEDICINE
Payer: COMMERCIAL

## 2023-10-30 DIAGNOSIS — Z94.4 S/P LIVER TRANSPLANT: ICD-10-CM

## 2023-10-30 DIAGNOSIS — B19.10 HEPATITIS B VIRUS INFECTION IN CADAVERIC DONOR: ICD-10-CM

## 2023-10-30 DIAGNOSIS — Z00.5 HEPATITIS B VIRUS INFECTION IN CADAVERIC DONOR: ICD-10-CM

## 2023-10-30 LAB — HBV SURFACE AG SERPL QL IA: NORMAL

## 2023-10-30 PROCEDURE — 87517 HEPATITIS B DNA QUANT: CPT | Performed by: INTERNAL MEDICINE

## 2023-10-30 PROCEDURE — 87340 HEPATITIS B SURFACE AG IA: CPT | Performed by: INTERNAL MEDICINE

## 2023-10-30 PROCEDURE — 80197 ASSAY OF TACROLIMUS: CPT | Performed by: INTERNAL MEDICINE

## 2023-10-31 LAB
HEPATITIS B VIRUS DNA: NORMAL
HEPATITIS B VIRUS PCR, QUANT: NOT DETECTED IU/ML
TACROLIMUS BLD-MCNC: 4.9 NG/ML (ref 5–15)

## 2023-11-02 ENCOUNTER — TELEPHONE (OUTPATIENT)
Dept: TRANSPLANT | Facility: CLINIC | Age: 43
End: 2023-11-02
Payer: MEDICAID

## 2023-11-02 NOTE — LETTER
November 2, 2023    Jose Berman  [address]  [place] [address]          Dear Jose Berman:  MRN: [id number]    This is a follow up to your recent labs, your lab results were stable.  There are no medicine changes.  Please have your labs drawn again on 1/29/24.      If you cannot have your labs drawn on the scheduled date, it is your responsibility to call the transplant department to reschedule.  Please call [phone] and ask to speak to Rohini Merchant  for all scheduling requests.     Sincerely,    Marie Ramos, RN,BSN,CCTC     Your Liver Transplant Coordinator    [facility]  [address] [place] [address]  [phone]

## 2023-11-02 NOTE — TELEPHONE ENCOUNTER
Labs reviewed and are stable, continue q 3 months. Letter sent.       ----- Message from Quita Wynn MD sent at 10/31/2023 12:12 PM CDT -----  The Labs are stable - please let patient know.

## 2023-12-18 ENCOUNTER — TELEPHONE (OUTPATIENT)
Dept: PEDIATRICS | Facility: CLINIC | Age: 43
End: 2023-12-18
Payer: MEDICAID

## 2024-02-05 ENCOUNTER — TELEPHONE (OUTPATIENT)
Dept: TRANSPLANT | Facility: CLINIC | Age: 44
End: 2024-02-05
Payer: MEDICAID

## 2024-02-05 NOTE — LETTER
February 5, 2024    Joes Berman  [address]  [place] [address]          Dear Jose Berman:  MRN: [id number]    Your lab work was due to be drawn on 2/2/24.  We contacted your lab and were unable to get test results.  It is very important to get your labs drawn as scheduled.  We cannot monitor you for rejection, infections, or drug toxicity side effects without lab results.  Please call us at [phone] as soon as possible to let us know when you plan to have labs drawn.    Sincerely,    Marie Ramos, RN,BSN,CCTC    Your Liver Transplant Coordinator    [facility]  [address] [place] [address]  [phone]

## 2024-03-05 DIAGNOSIS — Z94.4 S/P LIVER TRANSPLANT: ICD-10-CM

## 2024-03-05 RX ORDER — URSODIOL 500 MG/1
500 TABLET, FILM COATED ORAL 2 TIMES DAILY
Qty: 60 TABLET | Refills: 5 | Status: SHIPPED | OUTPATIENT
Start: 2024-03-05

## 2024-03-05 RX ORDER — TACROLIMUS 1 MG/1
6 CAPSULE ORAL EVERY 12 HOURS
Qty: 360 CAPSULE | Refills: 5 | Status: SHIPPED | OUTPATIENT
Start: 2024-03-05

## 2024-03-05 RX ORDER — LAMIVUDINE 150 MG/1
150 TABLET, FILM COATED ORAL DAILY
Qty: 30 TABLET | Refills: 5 | Status: SHIPPED | OUTPATIENT
Start: 2024-03-05

## 2024-03-05 RX ORDER — PANTOPRAZOLE SODIUM 40 MG/1
40 TABLET, DELAYED RELEASE ORAL DAILY
Qty: 30 TABLET | Refills: 11 | Status: SHIPPED | OUTPATIENT
Start: 2024-03-05

## 2024-03-05 NOTE — TELEPHONE ENCOUNTER
Refills sent to MD to sign.       ----- Message from Gareth Rausch sent at 3/5/2024  2:37 PM CST -----  Regarding: RX REFILL  Pt call to speak with Marie in regards to needing RX REFILL for ALL her RX requesting call back    [facility] - [place], [place] - [address]    Call

## 2024-03-07 ENCOUNTER — TELEPHONE (OUTPATIENT)
Dept: TRANSPLANT | Facility: CLINIC | Age: 44
End: 2024-03-07
Payer: MEDICAID

## 2024-03-07 NOTE — LETTER
March 7, 2024    Jose Berman  [address]  [place] [address]          Dear Jose Berman:  MRN: [id number]    Your lab work was due to be drawn on 2/2/24.  We contacted your lab and were unable to get test results.  It is very important to get your labs drawn as scheduled.  We cannot monitor you for rejection, infections, or drug toxicity side effects without lab results.  Please call us at [phone] as soon as possible to let us know when you plan to have labs drawn.    Sincerely,    Marie Ramos, RN,BSN,CCTC    Your Liver Transplant Coordinator    [facility]  [address] [place] [address]  [phone]

## 2024-03-14 ENCOUNTER — TELEPHONE (OUTPATIENT)
Dept: TRANSPLANT | Facility: CLINIC | Age: 44
End: 2024-03-14
Payer: MEDICAID

## 2024-03-14 NOTE — TELEPHONE ENCOUNTER
Informed pt lab orders faxed. Pt complaining of burning on her tongue. She states her blood pressure is also elevated. Informed pt to get reestablished with PCP to address.       ----- Message from Mona Love MA sent at 3/12/2024  4:15 PM CDT -----  Regarding: FW: Orders  Contact: Pt  [phone]    ----- Message -----  From: Tisha Emerson  Sent: 3/12/2024  12:34 PM CDT  To: [facility] Post-Liver Transplant Non-Clinical  Subject: Orders                                                     Appt Type:  Lab     Date/Time Preference:  Next parish     Treating Provider:  Quita Wynn MD      Caller Name: Jose     Contact Prefer: [phone]    Comments/Notes:   Called to reschedule missed lab appt from 02/03/24 would like order sent to [facility] in [place]

## 2024-03-15 LAB
EXT ALBUMIN: 4.4
EXT ALKALINE PHOSPHATASE: 214
EXT ALT: 20
EXT AST: 36
EXT BILIRUBIN TOTAL: 0.5
EXT BUN: 21
EXT CALCIUM: 9.1
EXT CHLORIDE: 110
EXT CO2: 25
EXT CREATININE: 1.3 MG/DL
EXT EOSINOPHIL%: 4.6
EXT GFR MDRD NON AF AMER: 52
EXT GGT: 149
EXT GLUCOSE: 88
EXT HBV DNA, QUALITATIVE PCR: NOT DETECTED
EXT HEMATOCRIT: 32.9
EXT HEMOGLOBIN: 10.4
EXT HEP B S AG: NON REACTIVE
EXT LYMPH%: 30.4
EXT MAGNESIUM: 1.7
EXT MONOCYTES%: 9.6
EXT PLATELETS: 188
EXT POTASSIUM: 3.9
EXT PROTEIN TOTAL: 7.9
EXT SEGS%: 54.5
EXT SODIUM: 141 MMOL/L
EXT TACROLIMUS LVL: 7.4
EXT WBC: 4.6
PHOSPHATIDYLETHANOL (PETH): 26 NG/ML

## 2024-03-21 ENCOUNTER — PATIENT MESSAGE (OUTPATIENT)
Dept: ADMINISTRATIVE | Facility: HOSPITAL | Age: 44
End: 2024-03-21
Payer: MEDICAID

## 2024-03-21 DIAGNOSIS — J30.2 SEASONAL ALLERGIES: ICD-10-CM

## 2024-03-21 DIAGNOSIS — I10 ESSENTIAL HYPERTENSION: ICD-10-CM

## 2024-03-21 RX ORDER — ALBUTEROL SULFATE 90 UG/1
2 AEROSOL, METERED RESPIRATORY (INHALATION) EVERY 6 HOURS PRN
Qty: 18 G | Refills: 5 | Status: SHIPPED | OUTPATIENT
Start: 2024-03-21

## 2024-03-21 RX ORDER — LISINOPRIL AND HYDROCHLOROTHIAZIDE 10; 12.5 MG/1; MG/1
1 TABLET ORAL DAILY
Qty: 30 TABLET | Refills: 5 | Status: SHIPPED | OUTPATIENT
Start: 2024-03-21

## 2024-03-21 NOTE — TELEPHONE ENCOUNTER
Refill Routing Note   Medication(s) are not appropriate for processing by [facility] for the following reason(s):      Non-participating provider    [facility] action(s):  Route Care Due:  None identified            Appointments  past 12m or future 3m with PCP    Date Provider   Last Visit   10/18/2023 Halie Villagran NP   Next Visit   5/7/2024 Halie Villagran NP   ED visits in past 90 days: 0        Note composed:11:02 AM 03/21/2024

## 2024-03-22 ENCOUNTER — TELEPHONE (OUTPATIENT)
Dept: TRANSPLANT | Facility: CLINIC | Age: 44
End: 2024-03-22
Payer: MEDICAID

## 2024-03-22 ENCOUNTER — TELEPHONE (OUTPATIENT)
Dept: INTERVENTIONAL RADIOLOGY/VASCULAR | Facility: CLINIC | Age: 44
End: 2024-03-22
Payer: MEDICAID

## 2024-03-22 ENCOUNTER — PATIENT MESSAGE (OUTPATIENT)
Dept: TRANSPLANT | Facility: CLINIC | Age: 44
End: 2024-03-22
Payer: MEDICAID

## 2024-03-22 DIAGNOSIS — R74.8 ALKALINE PHOSPHATASE ELEVATION: ICD-10-CM

## 2024-03-22 DIAGNOSIS — Z94.4 S/P LIVER TRANSPLANT: Primary | ICD-10-CM

## 2024-03-22 NOTE — TELEPHONE ENCOUNTER
Informed pt Dr. Wynn is recommending a liver biopsy. She is tearful but states she will proceed with scheduling.   Will place orders. IR to schedule.       ----- Message from Quita Wynn MD sent at 3/21/2024  8:53 PM CDT -----  The Labs are stable. Alkp remains elevated. Recommend liver biopsy if she is willing to do it - please let patient know.

## 2024-03-22 NOTE — TELEPHONE ENCOUNTER
"Will proceed with colonoscopy. Pt notified.         ----- Message from Quita Wynn MD sent at 3/22/2024  2:52 PM CDT -----  She is 43, colonoscopy is warranted  Proceed with liver biopsy  ----- Message -----  From: Marie Ramos RN  Sent: 3/22/2024   2:19 PM CDT  To: Quita Wynn MD    She is telling me she will do the biopsy.     She is asking for a colonoscopy, she states a few weeks ago she had a "weird iron smell" and noticed her H/H is lower. Is it ok if we order?     Her BP is also very uncontrolled, urged her to follow up with PCP.   ----- Message -----  From: Quita Wynn MD  Sent: 3/21/2024   8:53 PM CDT  To: [facility] Post-Liver Transplant Clinical    The Labs are stable. Alkp remains elevated. Recommend liver biopsy if she is willing to do it - please let patient know.      "

## 2024-03-25 ENCOUNTER — TELEPHONE (OUTPATIENT)
Dept: INTERVENTIONAL RADIOLOGY/VASCULAR | Facility: CLINIC | Age: 44
End: 2024-03-25
Payer: MEDICAID

## 2024-03-25 ENCOUNTER — TELEPHONE (OUTPATIENT)
Dept: ENDOSCOPY | Facility: HOSPITAL | Age: 44
End: 2024-03-25
Payer: MEDICAID

## 2024-05-03 ENCOUNTER — TELEPHONE (OUTPATIENT)
Dept: TRANSPLANT | Facility: CLINIC | Age: 44
End: 2024-05-03
Payer: MEDICAID

## 2024-05-03 ENCOUNTER — PATIENT MESSAGE (OUTPATIENT)
Dept: TRANSPLANT | Facility: CLINIC | Age: 44
End: 2024-05-03
Payer: MEDICAID

## 2024-05-03 NOTE — TELEPHONE ENCOUNTER
Pt not answering or returning calls to schedule colonoscopy and liver biopsy.   Will notify Dr. Wynn.

## 2024-06-05 ENCOUNTER — PATIENT MESSAGE (OUTPATIENT)
Dept: TRANSPLANT | Facility: CLINIC | Age: 44
End: 2024-06-05
Payer: MEDICAID

## 2024-06-19 ENCOUNTER — HOSPITAL ENCOUNTER (OUTPATIENT)
Dept: RADIOLOGY | Facility: HOSPITAL | Age: 44
Discharge: HOME | End: 2024-06-19
Attending: NURSE PRACTITIONER
Payer: MEDICAID

## 2024-06-19 DIAGNOSIS — Z12.31 OTHER SCREENING MAMMOGRAM: ICD-10-CM

## 2024-06-19 PROCEDURE — 77067 SCR MAMMO BI INCL CAD: CPT | Mod: TC

## 2024-07-01 ENCOUNTER — TELEPHONE (OUTPATIENT)
Dept: TRANSPLANT | Facility: CLINIC | Age: 44
End: 2024-07-01
Payer: MEDICAID

## 2024-07-01 NOTE — LETTER
July 1, 2024    Jose Berman  [address] [place] [address]          Dear Jose Cullen:  MRN: [id number]    Your lab work was due to be drawn on 6/5/24.  We contacted your lab and were unable to get test results.  It is very important to get your labs drawn as scheduled.  We cannot monitor you for rejection, infections, or drug toxicity side effects without lab results.  Please call us at [phone] as soon as possible to let us know when you plan to have labs drawn.    Sincerely,    Marie Ramos, RN,BSN,CCTC    Your Liver Transplant Coordinator    [facility]  [address] [place] [address]  [phone]

## 2024-07-11 ENCOUNTER — TELEPHONE (OUTPATIENT)
Dept: TRANSPLANT | Facility: CLINIC | Age: 44
End: 2024-07-11
Payer: MEDICAID

## 2024-07-11 NOTE — TELEPHONE ENCOUNTER
----- Message from Delphine Rowley sent at 7/11/2024 11:00 AM CDT -----  Regarding: Appts  Contact: [phone]  CONSULT/ADVISORY    Name of Caller: DYLON ACOSTA [[id number]]    Contact Preference:   [phone]    Nature of Call:  Pt is requesting labs be sched at [facility] in [place] for tomorrow.  Requesting a call back from Marie.  Pt is also requesting her yearly appt be sched in early August.

## 2024-08-21 ENCOUNTER — TELEPHONE (OUTPATIENT)
Dept: TRANSPLANT | Facility: CLINIC | Age: 44
End: 2024-08-21
Payer: MEDICAID

## 2024-08-21 DIAGNOSIS — Z94.4 S/P LIVER TRANSPLANT: ICD-10-CM

## 2024-08-21 RX ORDER — URSODIOL 500 MG/1
500 TABLET, FILM COATED ORAL 2 TIMES DAILY
Qty: 60 TABLET | Refills: 5 | Status: SHIPPED | OUTPATIENT
Start: 2024-08-21

## 2024-08-21 RX ORDER — TACROLIMUS 1 MG/1
6 CAPSULE ORAL EVERY 12 HOURS
Qty: 360 CAPSULE | Refills: 5 | Status: SHIPPED | OUTPATIENT
Start: 2024-08-21

## 2024-08-21 RX ORDER — LAMIVUDINE 150 MG/1
150 TABLET, FILM COATED ORAL DAILY
Qty: 30 TABLET | Refills: 5 | Status: SHIPPED | OUTPATIENT
Start: 2024-08-21

## 2024-08-22 ENCOUNTER — TELEPHONE (OUTPATIENT)
Dept: TRANSPLANT | Facility: CLINIC | Age: 44
End: 2024-08-22
Payer: MEDICAID

## 2024-08-28 ENCOUNTER — PATIENT MESSAGE (OUTPATIENT)
Dept: TRANSPLANT | Facility: CLINIC | Age: 44
End: 2024-08-28
Payer: MEDICAID

## 2024-08-29 ENCOUNTER — TELEPHONE (OUTPATIENT)
Dept: TRANSPLANT | Facility: CLINIC | Age: 44
End: 2024-08-29
Payer: MEDICAID

## 2024-08-29 NOTE — LETTER
August 29, 2024    Jose Berman  [address] [place] [address]          Dear Jose Cullen:  MRN: [id number]    Your lab work was due to be drawn on 6/5/24.  We contacted your lab and were unable to get test results.  It is very important to get your labs drawn as scheduled.  We cannot monitor you for rejection, infections, or drug toxicity side effects without lab results.  Please call us at [phone] as soon as possible to let us know when you plan to have labs drawn.    Sincerely,    Marie Ramos, RN,BSN,CCTC    Your Liver Transplant Coordinator    [facility]  [address] [place] [address]  [phone]

## 2024-09-25 ENCOUNTER — TELEPHONE (OUTPATIENT)
Dept: TRANSPLANT | Facility: CLINIC | Age: 44
End: 2024-09-25
Payer: MEDICAID

## 2024-10-01 ENCOUNTER — TELEPHONE (OUTPATIENT)
Dept: TRANSPLANT | Facility: CLINIC | Age: 44
End: 2024-10-01
Payer: MEDICAID

## 2024-10-01 NOTE — TELEPHONE ENCOUNTER
Called pharmacy who states the prescription has gone through, there was an error with [place] that has resolved. Pt notified. She states she is trying to get to lab, very overdue. Asked pt to notify office when she does go.     ----- Message from [employer] sent at 10/1/2024 12:44 PM CDT -----  Regarding: Consult/Advisory  Contact: :Jose Berman     Consult/Advisory     Name Of Caller:Jose Berman         Contact Preference:[phone] (home)       Nature of call:Patient is calling to speak to Marie about her meds that she can't get filled pharmacy stating that her provider is not a medicaid provider. Requesting a call back

## 2024-10-23 ENCOUNTER — OFFICE VISIT (OUTPATIENT)
Dept: TRANSPLANT | Facility: CLINIC | Age: 44
End: 2024-10-23
Payer: MEDICAID

## 2024-10-23 ENCOUNTER — PATIENT MESSAGE (OUTPATIENT)
Dept: TRANSPLANT | Facility: CLINIC | Age: 44
End: 2024-10-23

## 2024-10-23 VITALS
DIASTOLIC BLOOD PRESSURE: 126 MMHG | HEIGHT: 68 IN | OXYGEN SATURATION: 100 % | BODY MASS INDEX: 17.26 KG/M2 | HEART RATE: 81 BPM | SYSTOLIC BLOOD PRESSURE: 190 MMHG | TEMPERATURE: 98 F | WEIGHT: 113.88 LBS

## 2024-10-23 DIAGNOSIS — Z29.89 PROPHYLACTIC IMMUNOTHERAPY: ICD-10-CM

## 2024-10-23 DIAGNOSIS — T86.49 BILIARY STRICTURE OF TRANSPLANTED LIVER: ICD-10-CM

## 2024-10-23 DIAGNOSIS — K83.1 BILIARY STRICTURE OF TRANSPLANTED LIVER: ICD-10-CM

## 2024-10-23 DIAGNOSIS — K83.1 BILIARY OBSTRUCTION: ICD-10-CM

## 2024-10-23 DIAGNOSIS — Z94.4 S/P LIVER TRANSPLANT: Primary | ICD-10-CM

## 2024-10-23 PROCEDURE — 3080F DIAST BP >= 90 MM HG: CPT | Mod: CPTII,,, | Performed by: INTERNAL MEDICINE

## 2024-10-23 PROCEDURE — 99215 OFFICE O/P EST HI 40 MIN: CPT | Mod: S$PBB,,, | Performed by: INTERNAL MEDICINE

## 2024-10-23 PROCEDURE — 3008F BODY MASS INDEX DOCD: CPT | Mod: CPTII,,, | Performed by: INTERNAL MEDICINE

## 2024-10-23 PROCEDURE — 4010F ACE/ARB THERAPY RXD/TAKEN: CPT | Mod: CPTII,,, | Performed by: INTERNAL MEDICINE

## 2024-10-23 PROCEDURE — 1160F RVW MEDS BY RX/DR IN RCRD: CPT | Mod: CPTII,,, | Performed by: INTERNAL MEDICINE

## 2024-10-23 PROCEDURE — 1159F MED LIST DOCD IN RCRD: CPT | Mod: CPTII,,, | Performed by: INTERNAL MEDICINE

## 2024-10-23 PROCEDURE — 3077F SYST BP >= 140 MM HG: CPT | Mod: CPTII,,, | Performed by: INTERNAL MEDICINE

## 2024-10-23 PROCEDURE — 99214 OFFICE O/P EST MOD 30 MIN: CPT | Mod: PBBFAC,PN | Performed by: INTERNAL MEDICINE

## 2024-10-23 PROCEDURE — 99999 PR PBB SHADOW E&M-EST. PATIENT-LVL IV: CPT | Mod: PBBFAC,,, | Performed by: INTERNAL MEDICINE

## 2024-10-25 ENCOUNTER — TELEPHONE (OUTPATIENT)
Dept: TRANSPLANT | Facility: CLINIC | Age: 44
End: 2024-10-25
Payer: MEDICAID

## 2024-10-25 NOTE — TELEPHONE ENCOUNTER
Called to follow up with pt , she has not go to the ER at [facility] as instructed. Left pt voicemail to go ASAP.   Pt sent My Chart message with mammogram result, responded with instructions to go to ER ASAP. Will continue to try and reach pt.

## 2024-10-30 ENCOUNTER — TELEPHONE (OUTPATIENT)
Dept: TRANSPLANT | Facility: CLINIC | Age: 44
End: 2024-10-30
Payer: MEDICAID

## 2024-11-03 ENCOUNTER — HOSPITAL ENCOUNTER (INPATIENT)
Facility: HOSPITAL | Age: 44
LOS: 5 days | Discharge: HOME | End: 2024-11-08
Attending: FAMILY MEDICINE | Admitting: STUDENT IN AN ORGANIZED HEALTH CARE EDUCATION/TRAINING PROGRAM
Payer: MEDICAID

## 2024-11-03 DIAGNOSIS — R06.02 SOB (SHORTNESS OF BREATH): ICD-10-CM

## 2024-11-03 DIAGNOSIS — M06.9 RHEUMATOID ARTHRITIS OF WRIST, UNSPECIFIED LATERALITY, UNSPECIFIED WHETHER RHEUMATOID FACTOR PRESENT: ICD-10-CM

## 2024-11-03 DIAGNOSIS — N39.0 RECURRENT UTI: ICD-10-CM

## 2024-11-03 DIAGNOSIS — R53.82 CHRONIC FATIGUE: ICD-10-CM

## 2024-11-03 DIAGNOSIS — N17.9 AKI (ACUTE KIDNEY INJURY): ICD-10-CM

## 2024-11-03 DIAGNOSIS — N61.1 BREAST ABSCESS OF FEMALE: Primary | ICD-10-CM

## 2024-11-03 DIAGNOSIS — T86.49 BILIARY STRICTURE OF TRANSPLANTED LIVER: ICD-10-CM

## 2024-11-03 DIAGNOSIS — F17.210 CIGARETTE NICOTINE DEPENDENCE WITHOUT COMPLICATION: ICD-10-CM

## 2024-11-03 DIAGNOSIS — Z94.4 S/P LIVER TRANSPLANT: ICD-10-CM

## 2024-11-03 DIAGNOSIS — T86.49 OTHER COMPLICATION OF LIVER TRANSPLANT: ICD-10-CM

## 2024-11-03 DIAGNOSIS — F33.41 RECURRENT MAJOR DEPRESSIVE DISORDER, IN PARTIAL REMISSION: ICD-10-CM

## 2024-11-03 DIAGNOSIS — K83.1 BILIARY STRICTURE OF TRANSPLANTED LIVER: ICD-10-CM

## 2024-11-03 DIAGNOSIS — F10.10 ALCOHOL ABUSE, CONTINUOUS: ICD-10-CM

## 2024-11-03 DIAGNOSIS — F41.1 GAD (GENERALIZED ANXIETY DISORDER): ICD-10-CM

## 2024-11-03 DIAGNOSIS — Z85.828 HISTORY OF SKIN CANCER: ICD-10-CM

## 2024-11-03 DIAGNOSIS — R79.89 ELEVATED LIVER FUNCTION TESTS: ICD-10-CM

## 2024-11-03 DIAGNOSIS — Z94.4 LIVER TRANSPLANTED: ICD-10-CM

## 2024-11-03 DIAGNOSIS — N17.0 ATN (ACUTE TUBULAR NECROSIS): ICD-10-CM

## 2024-11-03 DIAGNOSIS — R07.9 CHEST PAIN: ICD-10-CM

## 2024-11-03 DIAGNOSIS — R41.3 SHORT-TERM MEMORY LOSS: ICD-10-CM

## 2024-11-03 DIAGNOSIS — Z29.89 PROPHYLACTIC IMMUNOTHERAPY: ICD-10-CM

## 2024-11-03 PROBLEM — B19.10 HEPATITIS B: Status: ACTIVE | Noted: 2024-11-03

## 2024-11-03 LAB
ALBUMIN SERPL BCP-MCNC: 3.5 G/DL (ref 3.5–5.2)
ALP SERPL-CCNC: 239 U/L (ref 40–150)
ALT SERPL W/O P-5'-P-CCNC: 18 U/L (ref 10–44)
ANION GAP SERPL CALC-SCNC: 14 MMOL/L (ref 8–16)
APTT PPP: 31.5 SEC (ref 21–32)
AST SERPL-CCNC: 29 U/L (ref 10–40)
B-HCG UR QL: NEGATIVE
BASOPHILS # BLD AUTO: 0.01 K/UL (ref 0–0.2)
BASOPHILS NFR BLD: 0.1 % (ref 0–1.9)
BILIRUB SERPL-MCNC: 0.7 MG/DL (ref 0.1–1)
BUN SERPL-MCNC: 29 MG/DL (ref 6–20)
CALCIUM SERPL-MCNC: 8.6 MG/DL (ref 8.7–10.5)
CHLORIDE SERPL-SCNC: 104 MMOL/L (ref 95–110)
CO2 SERPL-SCNC: 21 MMOL/L (ref 23–29)
CREAT SERPL-MCNC: 2.5 MG/DL (ref 0.5–1.4)
DIFFERENTIAL METHOD BLD: ABNORMAL
EOSINOPHIL # BLD AUTO: 0.1 K/UL (ref 0–0.5)
EOSINOPHIL NFR BLD: 0.9 % (ref 0–8)
ERYTHROCYTE [DISTWIDTH] IN BLOOD BY AUTOMATED COUNT: 13.9 % (ref 11.5–14.5)
EST. GFR  (NO RACE VARIABLE): 23.4 ML/MIN/1.73 M^2
GLUCOSE SERPL-MCNC: 61 MG/DL (ref 70–110)
HCT VFR BLD AUTO: 29.3 % (ref 37–48.5)
HGB BLD-MCNC: 10 G/DL (ref 12–16)
IMM GRANULOCYTES # BLD AUTO: 0.02 K/UL (ref 0–0.04)
IMM GRANULOCYTES NFR BLD AUTO: 0.2 % (ref 0–0.5)
INR PPP: 1 (ref 0.8–1.2)
LACTATE SERPL-SCNC: 1.1 MMOL/L (ref 0.5–2.2)
LYMPHOCYTES # BLD AUTO: 1 K/UL (ref 1–4.8)
LYMPHOCYTES NFR BLD: 10 % (ref 18–48)
MCH RBC QN AUTO: 32.8 PG (ref 27–31)
MCHC RBC AUTO-ENTMCNC: 34.1 G/DL (ref 32–36)
MCV RBC AUTO: 96 FL (ref 82–98)
MONOCYTES # BLD AUTO: 0.7 K/UL (ref 0.3–1)
MONOCYTES NFR BLD: 7.8 % (ref 4–15)
NEUTROPHILS # BLD AUTO: 7.7 K/UL (ref 1.8–7.7)
NEUTROPHILS NFR BLD: 81 % (ref 38–73)
NRBC BLD-RTO: 0 /100 WBC
PLATELET # BLD AUTO: 128 K/UL (ref 150–450)
PMV BLD AUTO: 10 FL (ref 9.2–12.9)
POTASSIUM SERPL-SCNC: 2.7 MMOL/L (ref 3.5–5.1)
PROT SERPL-MCNC: 7.6 G/DL (ref 6–8.4)
PROTHROMBIN TIME: 11 SEC (ref 9–12.5)
RBC # BLD AUTO: 3.05 M/UL (ref 4–5.4)
SODIUM SERPL-SCNC: 139 MMOL/L (ref 136–145)
WBC # BLD AUTO: 9.53 K/UL (ref 3.9–12.7)

## 2024-11-03 PROCEDURE — 96365 THER/PROPH/DIAG IV INF INIT: CPT

## 2024-11-03 PROCEDURE — 83605 ASSAY OF LACTIC ACID: CPT | Performed by: FAMILY MEDICINE

## 2024-11-03 PROCEDURE — 87186 SC STD MICRODIL/AGAR DIL: CPT | Performed by: FAMILY MEDICINE

## 2024-11-03 PROCEDURE — 85025 COMPLETE CBC W/AUTO DIFF WBC: CPT | Performed by: FAMILY MEDICINE

## 2024-11-03 PROCEDURE — 0H9U0ZZ DRAINAGE OF LEFT BREAST, OPEN APPROACH: ICD-10-PCS | Performed by: FAMILY MEDICINE

## 2024-11-03 PROCEDURE — 85610 PROTHROMBIN TIME: CPT | Performed by: FAMILY MEDICINE

## 2024-11-03 PROCEDURE — 96375 TX/PRO/DX INJ NEW DRUG ADDON: CPT

## 2024-11-03 PROCEDURE — 25000003 PHARM REV CODE 250: Performed by: NURSE PRACTITIONER

## 2024-11-03 PROCEDURE — 71250 CT THORAX DX C-: CPT | Mod: 26,,, | Performed by: RADIOLOGY

## 2024-11-03 PROCEDURE — 87077 CULTURE AEROBIC IDENTIFY: CPT | Performed by: FAMILY MEDICINE

## 2024-11-03 PROCEDURE — 96376 TX/PRO/DX INJ SAME DRUG ADON: CPT

## 2024-11-03 PROCEDURE — 63600175 PHARM REV CODE 636 W HCPCS: Performed by: FAMILY MEDICINE

## 2024-11-03 PROCEDURE — 81025 URINE PREGNANCY TEST: CPT | Performed by: FAMILY MEDICINE

## 2024-11-03 PROCEDURE — 80053 COMPREHEN METABOLIC PANEL: CPT | Performed by: FAMILY MEDICINE

## 2024-11-03 PROCEDURE — S4991 NICOTINE PATCH NONLEGEND: HCPCS | Performed by: NURSE PRACTITIONER

## 2024-11-03 PROCEDURE — 71250 CT THORAX DX C-: CPT | Mod: TC

## 2024-11-03 PROCEDURE — 96366 THER/PROPH/DIAG IV INF ADDON: CPT

## 2024-11-03 PROCEDURE — 87075 CULTR BACTERIA EXCEPT BLOOD: CPT | Performed by: FAMILY MEDICINE

## 2024-11-03 PROCEDURE — 99285 EMERGENCY DEPT VISIT HI MDM: CPT | Mod: 25

## 2024-11-03 PROCEDURE — S5010 5% DEXTROSE AND 0.45% SALINE: HCPCS | Performed by: FAMILY MEDICINE

## 2024-11-03 PROCEDURE — 96361 HYDRATE IV INFUSION ADD-ON: CPT

## 2024-11-03 PROCEDURE — 25000003 PHARM REV CODE 250: Mod: UD | Performed by: FAMILY MEDICINE

## 2024-11-03 PROCEDURE — 87040 BLOOD CULTURE FOR BACTERIA: CPT | Mod: 59 | Performed by: FAMILY MEDICINE

## 2024-11-03 PROCEDURE — 87070 CULTURE OTHR SPECIMN AEROBIC: CPT | Performed by: FAMILY MEDICINE

## 2024-11-03 PROCEDURE — 85730 THROMBOPLASTIN TIME PARTIAL: CPT | Performed by: FAMILY MEDICINE

## 2024-11-03 PROCEDURE — 12000002 HC ACUTE/MED SURGE SEMI-PRIVATE ROOM

## 2024-11-03 RX ORDER — IPRATROPIUM BROMIDE AND ALBUTEROL SULFATE 2.5; .5 MG/3ML; MG/3ML
3 SOLUTION RESPIRATORY (INHALATION) EVERY 4 HOURS PRN
Status: DISCONTINUED | OUTPATIENT
Start: 2024-11-04 | End: 2024-11-08 | Stop reason: HOSPADM

## 2024-11-03 RX ORDER — IBUPROFEN 200 MG
1 TABLET ORAL DAILY
Status: DISCONTINUED | OUTPATIENT
Start: 2024-11-03 | End: 2024-11-08 | Stop reason: HOSPADM

## 2024-11-03 RX ORDER — HYDROCODONE BITARTRATE AND ACETAMINOPHEN 5; 325 MG/1; MG/1
1 TABLET ORAL EVERY 6 HOURS PRN
Status: DISCONTINUED | OUTPATIENT
Start: 2024-11-04 | End: 2024-11-05

## 2024-11-03 RX ORDER — HYDROMORPHONE HYDROCHLORIDE 1 MG/ML
1 INJECTION, SOLUTION INTRAMUSCULAR; INTRAVENOUS; SUBCUTANEOUS
Status: COMPLETED | OUTPATIENT
Start: 2024-11-03 | End: 2024-11-03

## 2024-11-03 RX ORDER — ACETAMINOPHEN 325 MG/1
650 TABLET ORAL EVERY 8 HOURS PRN
Status: DISCONTINUED | OUTPATIENT
Start: 2024-11-04 | End: 2024-11-08 | Stop reason: HOSPADM

## 2024-11-03 RX ORDER — DEXTROSE MONOHYDRATE AND SODIUM CHLORIDE 5; .45 G/100ML; G/100ML
1000 INJECTION, SOLUTION INTRAVENOUS
Status: COMPLETED | OUTPATIENT
Start: 2024-11-03 | End: 2024-11-04

## 2024-11-03 RX ORDER — HYDROCODONE BITARTRATE AND ACETAMINOPHEN 10; 325 MG/1; MG/1
1 TABLET ORAL EVERY 6 HOURS PRN
Status: DISCONTINUED | OUTPATIENT
Start: 2024-11-04 | End: 2024-11-06

## 2024-11-03 RX ORDER — POTASSIUM CHLORIDE 20 MEQ/1
40 TABLET, EXTENDED RELEASE ORAL
Status: COMPLETED | OUTPATIENT
Start: 2024-11-03 | End: 2024-11-03

## 2024-11-03 RX ORDER — IBUPROFEN 200 MG
16 TABLET ORAL
Status: DISCONTINUED | OUTPATIENT
Start: 2024-11-04 | End: 2024-11-08 | Stop reason: HOSPADM

## 2024-11-03 RX ORDER — AMOXICILLIN 250 MG
1 CAPSULE ORAL 2 TIMES DAILY PRN
Status: DISCONTINUED | OUTPATIENT
Start: 2024-11-04 | End: 2024-11-08 | Stop reason: HOSPADM

## 2024-11-03 RX ORDER — LABETALOL HCL 20 MG/4 ML
10 SYRINGE (ML) INTRAVENOUS EVERY 6 HOURS PRN
Status: DISCONTINUED | OUTPATIENT
Start: 2024-11-04 | End: 2024-11-06

## 2024-11-03 RX ORDER — SIMETHICONE 80 MG
1 TABLET,CHEWABLE ORAL 4 TIMES DAILY PRN
Status: DISCONTINUED | OUTPATIENT
Start: 2024-11-04 | End: 2024-11-08 | Stop reason: HOSPADM

## 2024-11-03 RX ORDER — ONDANSETRON HYDROCHLORIDE 2 MG/ML
4 INJECTION, SOLUTION INTRAVENOUS EVERY 8 HOURS PRN
Status: DISCONTINUED | OUTPATIENT
Start: 2024-11-04 | End: 2024-11-08 | Stop reason: HOSPADM

## 2024-11-03 RX ORDER — ALUMINUM HYDROXIDE, MAGNESIUM HYDROXIDE, AND SIMETHICONE 1200; 120; 1200 MG/30ML; MG/30ML; MG/30ML
30 SUSPENSION ORAL 4 TIMES DAILY PRN
Status: DISCONTINUED | OUTPATIENT
Start: 2024-11-04 | End: 2024-11-08 | Stop reason: HOSPADM

## 2024-11-03 RX ORDER — TACROLIMUS 1 MG/1
6 CAPSULE ORAL 2 TIMES DAILY
Status: DISCONTINUED | OUTPATIENT
Start: 2024-11-04 | End: 2024-11-08 | Stop reason: HOSPADM

## 2024-11-03 RX ORDER — HYDRALAZINE HYDROCHLORIDE 20 MG/ML
10 INJECTION INTRAMUSCULAR; INTRAVENOUS
Status: COMPLETED | OUTPATIENT
Start: 2024-11-03 | End: 2024-11-03

## 2024-11-03 RX ORDER — NALOXONE HCL 0.4 MG/ML
0.02 VIAL (ML) INJECTION
Status: DISCONTINUED | OUTPATIENT
Start: 2024-11-04 | End: 2024-11-08 | Stop reason: HOSPADM

## 2024-11-03 RX ORDER — CEFTRIAXONE 2 G/1
2 INJECTION, POWDER, FOR SOLUTION INTRAMUSCULAR; INTRAVENOUS
Status: DISCONTINUED | OUTPATIENT
Start: 2024-11-04 | End: 2024-11-08 | Stop reason: HOSPADM

## 2024-11-03 RX ORDER — IBUPROFEN 200 MG
24 TABLET ORAL
Status: DISCONTINUED | OUTPATIENT
Start: 2024-11-04 | End: 2024-11-08 | Stop reason: HOSPADM

## 2024-11-03 RX ORDER — PANTOPRAZOLE SODIUM 40 MG/1
40 TABLET, DELAYED RELEASE ORAL DAILY
Status: DISCONTINUED | OUTPATIENT
Start: 2024-11-04 | End: 2024-11-08 | Stop reason: HOSPADM

## 2024-11-03 RX ORDER — LAMIVUDINE 150 MG/1
150 TABLET, FILM COATED ORAL DAILY
Status: DISCONTINUED | OUTPATIENT
Start: 2024-11-04 | End: 2024-11-04

## 2024-11-03 RX ORDER — GLUCAGON 1 MG
1 KIT INJECTION
Status: DISCONTINUED | OUTPATIENT
Start: 2024-11-04 | End: 2024-11-08 | Stop reason: HOSPADM

## 2024-11-03 RX ORDER — SODIUM CHLORIDE AND POTASSIUM CHLORIDE 150; 900 MG/100ML; MG/100ML
INJECTION, SOLUTION INTRAVENOUS CONTINUOUS
Status: DISPENSED | OUTPATIENT
Start: 2024-11-04 | End: 2024-11-04

## 2024-11-03 RX ORDER — TALC
6 POWDER (GRAM) TOPICAL NIGHTLY PRN
Status: DISCONTINUED | OUTPATIENT
Start: 2024-11-04 | End: 2024-11-04

## 2024-11-03 RX ORDER — CEFTRIAXONE 1 G/1
1 INJECTION, POWDER, FOR SOLUTION INTRAMUSCULAR; INTRAVENOUS
Status: COMPLETED | OUTPATIENT
Start: 2024-11-03 | End: 2024-11-03

## 2024-11-03 RX ORDER — SODIUM CHLORIDE 0.9 % (FLUSH) 0.9 %
10 SYRINGE (ML) INJECTION EVERY 12 HOURS PRN
Status: DISCONTINUED | OUTPATIENT
Start: 2024-11-04 | End: 2024-11-08 | Stop reason: HOSPADM

## 2024-11-03 RX ORDER — POTASSIUM CHLORIDE 20 MEQ/1
40 TABLET, EXTENDED RELEASE ORAL
Status: COMPLETED | OUTPATIENT
Start: 2024-11-03 | End: 2024-11-04

## 2024-11-03 RX ADMIN — HYDROMORPHONE HYDROCHLORIDE 1 MG: 1 INJECTION, SOLUTION INTRAMUSCULAR; INTRAVENOUS; SUBCUTANEOUS at 09:11

## 2024-11-03 RX ADMIN — POTASSIUM CHLORIDE 40 MEQ: 1500 TABLET, EXTENDED RELEASE ORAL at 09:11

## 2024-11-03 RX ADMIN — HYDROMORPHONE HYDROCHLORIDE 1 MG: 1 INJECTION, SOLUTION INTRAMUSCULAR; INTRAVENOUS; SUBCUTANEOUS at 10:11

## 2024-11-03 RX ADMIN — LABETALOL HYDROCHLORIDE 10 MG: 5 INJECTION, SOLUTION INTRAVENOUS at 11:11

## 2024-11-03 RX ADMIN — LIDOCAINE HYDROCHLORIDE 10 ML: 10; .005 INJECTION, SOLUTION EPIDURAL; INFILTRATION; INTRACAUDAL; PERINEURAL at 09:11

## 2024-11-03 RX ADMIN — DEXTROSE MONOHYDRATE 1000 MG: 50 INJECTION, SOLUTION INTRAVENOUS at 11:11

## 2024-11-03 RX ADMIN — DEXTROSE AND SODIUM CHLORIDE 1000 ML: 5; 450 INJECTION, SOLUTION INTRAVENOUS at 09:11

## 2024-11-03 RX ADMIN — HYDRALAZINE HYDROCHLORIDE 10 MG: 20 INJECTION INTRAMUSCULAR; INTRAVENOUS at 08:11

## 2024-11-03 RX ADMIN — NICOTINE 1 PATCH: 21 PATCH, EXTENDED RELEASE TRANSDERMAL at 11:11

## 2024-11-03 RX ADMIN — POTASSIUM CHLORIDE 40 MEQ: 1500 TABLET, EXTENDED RELEASE ORAL at 11:11

## 2024-11-03 RX ADMIN — CEFTRIAXONE SODIUM 1 G: 1 INJECTION, POWDER, FOR SOLUTION INTRAMUSCULAR; INTRAVENOUS at 11:11

## 2024-11-03 RX ADMIN — HYDROMORPHONE HYDROCHLORIDE 1 MG: 1 INJECTION, SOLUTION INTRAMUSCULAR; INTRAVENOUS; SUBCUTANEOUS at 08:11

## 2024-11-03 RX ADMIN — SODIUM CHLORIDE 1000 ML: 9 INJECTION, SOLUTION INTRAVENOUS at 08:11

## 2024-11-04 ENCOUNTER — ANESTHESIA EVENT (OUTPATIENT)
Dept: SURGERY | Facility: HOSPITAL | Age: 44
End: 2024-11-04
Payer: MEDICAID

## 2024-11-04 ENCOUNTER — ANESTHESIA (OUTPATIENT)
Dept: SURGERY | Facility: HOSPITAL | Age: 44
End: 2024-11-04
Payer: MEDICAID

## 2024-11-04 LAB
ANION GAP SERPL CALC-SCNC: 11 MMOL/L (ref 8–16)
BACTERIA #/AREA URNS HPF: ABNORMAL /HPF
BASOPHILS # BLD AUTO: 0.02 K/UL (ref 0–0.2)
BASOPHILS NFR BLD: 0.2 % (ref 0–1.9)
BILIRUB UR QL STRIP: NEGATIVE
BUN SERPL-MCNC: 25 MG/DL (ref 6–20)
CALCIUM SERPL-MCNC: 8.5 MG/DL (ref 8.7–10.5)
CHLORIDE SERPL-SCNC: 107 MMOL/L (ref 95–110)
CLARITY UR: ABNORMAL
CO2 SERPL-SCNC: 18 MMOL/L (ref 23–29)
COLOR UR: YELLOW
CREAT SERPL-MCNC: 2.1 MG/DL (ref 0.5–1.4)
DIFFERENTIAL METHOD BLD: ABNORMAL
EOSINOPHIL # BLD AUTO: 0 K/UL (ref 0–0.5)
EOSINOPHIL NFR BLD: 0.3 % (ref 0–8)
ERYTHROCYTE [DISTWIDTH] IN BLOOD BY AUTOMATED COUNT: 13.8 % (ref 11.5–14.5)
EST. GFR  (NO RACE VARIABLE): 28.9 ML/MIN/1.73 M^2
ESTIMATED AVG GLUCOSE: 80 MG/DL (ref 68–131)
GLUCOSE SERPL-MCNC: 106 MG/DL (ref 70–110)
GLUCOSE UR QL STRIP: NEGATIVE
HBA1C MFR BLD: 4.4 % (ref 4–5.6)
HCT VFR BLD AUTO: 26.3 % (ref 37–48.5)
HGB BLD-MCNC: 9.2 G/DL (ref 12–16)
HGB UR QL STRIP: ABNORMAL
HYALINE CASTS #/AREA URNS LPF: 0 /LPF
IMM GRANULOCYTES # BLD AUTO: 0.03 K/UL (ref 0–0.04)
IMM GRANULOCYTES NFR BLD AUTO: 0.3 % (ref 0–0.5)
KETONES UR QL STRIP: NEGATIVE
LEUKOCYTE ESTERASE UR QL STRIP: NEGATIVE
LYMPHOCYTES # BLD AUTO: 0.6 K/UL (ref 1–4.8)
LYMPHOCYTES NFR BLD: 6.4 % (ref 18–48)
MAGNESIUM SERPL-MCNC: 1.5 MG/DL (ref 1.6–2.6)
MCH RBC QN AUTO: 33.5 PG (ref 27–31)
MCHC RBC AUTO-ENTMCNC: 35 G/DL (ref 32–36)
MCV RBC AUTO: 96 FL (ref 82–98)
MICROSCOPIC COMMENT: ABNORMAL
MONOCYTES # BLD AUTO: 0.9 K/UL (ref 0.3–1)
MONOCYTES NFR BLD: 9 % (ref 4–15)
NEUTROPHILS # BLD AUTO: 8.2 K/UL (ref 1.8–7.7)
NEUTROPHILS NFR BLD: 83.8 % (ref 38–73)
NITRITE UR QL STRIP: POSITIVE
NRBC BLD-RTO: 0 /100 WBC
PH UR STRIP: 6 [PH] (ref 5–8)
PLATELET # BLD AUTO: 109 K/UL (ref 150–450)
PMV BLD AUTO: 11 FL (ref 9.2–12.9)
POTASSIUM SERPL-SCNC: 3.6 MMOL/L (ref 3.5–5.1)
PROT UR QL STRIP: ABNORMAL
RBC # BLD AUTO: 2.75 M/UL (ref 4–5.4)
RBC #/AREA URNS HPF: 5 /HPF (ref 0–4)
SODIUM SERPL-SCNC: 136 MMOL/L (ref 136–145)
SP GR UR STRIP: 1.02 (ref 1–1.03)
SQUAMOUS #/AREA URNS HPF: 5 /HPF
TACROLIMUS BLD-MCNC: <2 NG/ML (ref 5–15)
URN SPEC COLLECT METH UR: ABNORMAL
UROBILINOGEN UR STRIP-ACNC: 1 EU/DL
VANCOMYCIN SERPL-MCNC: 13.3 UG/ML
WBC # BLD AUTO: 9.77 K/UL (ref 3.9–12.7)
WBC #/AREA URNS HPF: 20 /HPF (ref 0–5)

## 2024-11-04 PROCEDURE — 25000003 PHARM REV CODE 250: Performed by: SPECIALIST

## 2024-11-04 PROCEDURE — 87075 CULTR BACTERIA EXCEPT BLOOD: CPT | Performed by: SPECIALIST

## 2024-11-04 PROCEDURE — 87088 URINE BACTERIA CULTURE: CPT | Performed by: NURSE PRACTITIONER

## 2024-11-04 PROCEDURE — 0H9U0ZZ DRAINAGE OF LEFT BREAST, OPEN APPROACH: ICD-10-PCS | Performed by: SPECIALIST

## 2024-11-04 PROCEDURE — 36415 COLL VENOUS BLD VENIPUNCTURE: CPT | Performed by: STUDENT IN AN ORGANIZED HEALTH CARE EDUCATION/TRAINING PROGRAM

## 2024-11-04 PROCEDURE — 63600175 PHARM REV CODE 636 W HCPCS: Mod: UD | Performed by: NURSE ANESTHETIST, CERTIFIED REGISTERED

## 2024-11-04 PROCEDURE — 85025 COMPLETE CBC W/AUTO DIFF WBC: CPT | Performed by: NURSE PRACTITIONER

## 2024-11-04 PROCEDURE — 80197 ASSAY OF TACROLIMUS: CPT | Performed by: NURSE PRACTITIONER

## 2024-11-04 PROCEDURE — 63600175 PHARM REV CODE 636 W HCPCS: Performed by: STUDENT IN AN ORGANIZED HEALTH CARE EDUCATION/TRAINING PROGRAM

## 2024-11-04 PROCEDURE — 25000003 PHARM REV CODE 250: Mod: UD | Performed by: SPECIALIST

## 2024-11-04 PROCEDURE — 63600175 PHARM REV CODE 636 W HCPCS: Performed by: SPECIALIST

## 2024-11-04 PROCEDURE — 94799 UNLISTED PULMONARY SVC/PX: CPT

## 2024-11-04 PROCEDURE — 99223 1ST HOSP IP/OBS HIGH 75: CPT | Mod: ,,, | Performed by: STUDENT IN AN ORGANIZED HEALTH CARE EDUCATION/TRAINING PROGRAM

## 2024-11-04 PROCEDURE — 81000 URINALYSIS NONAUTO W/SCOPE: CPT | Performed by: NURSE PRACTITIONER

## 2024-11-04 PROCEDURE — 25000003 PHARM REV CODE 250: Performed by: NURSE PRACTITIONER

## 2024-11-04 PROCEDURE — 99900035 HC TECH TIME PER 15 MIN (STAT)

## 2024-11-04 PROCEDURE — 63600175 PHARM REV CODE 636 W HCPCS: Performed by: ANESTHESIOLOGY

## 2024-11-04 PROCEDURE — 87077 CULTURE AEROBIC IDENTIFY: CPT | Performed by: SPECIALIST

## 2024-11-04 PROCEDURE — 37000009 HC ANESTHESIA EA ADD 15 MINS: Performed by: SPECIALIST

## 2024-11-04 PROCEDURE — 99406 BEHAV CHNG SMOKING 3-10 MIN: CPT

## 2024-11-04 PROCEDURE — 87186 SC STD MICRODIL/AGAR DIL: CPT | Performed by: NURSE PRACTITIONER

## 2024-11-04 PROCEDURE — 27201423 OPTIME MED/SURG SUP & DEVICES STERILE SUPPLY: Performed by: SPECIALIST

## 2024-11-04 PROCEDURE — 21400001 HC TELEMETRY ROOM

## 2024-11-04 PROCEDURE — 10061 I&D ABSCESS COMP/MULTIPLE: CPT | Mod: ,,, | Performed by: SPECIALIST

## 2024-11-04 PROCEDURE — 63600175 PHARM REV CODE 636 W HCPCS: Mod: UD | Performed by: SPECIALIST

## 2024-11-04 PROCEDURE — 37000008 HC ANESTHESIA 1ST 15 MINUTES: Performed by: SPECIALIST

## 2024-11-04 PROCEDURE — 11000001 HC ACUTE MED/SURG PRIVATE ROOM

## 2024-11-04 PROCEDURE — 36000705 HC OR TIME LEV I EA ADD 15 MIN: Performed by: SPECIALIST

## 2024-11-04 PROCEDURE — 27000221 HC OXYGEN, UP TO 24 HOURS

## 2024-11-04 PROCEDURE — 87086 URINE CULTURE/COLONY COUNT: CPT | Performed by: NURSE PRACTITIONER

## 2024-11-04 PROCEDURE — 94760 N-INVAS EAR/PLS OXIMETRY 1: CPT

## 2024-11-04 PROCEDURE — 83735 ASSAY OF MAGNESIUM: CPT | Performed by: NURSE PRACTITIONER

## 2024-11-04 PROCEDURE — 80048 BASIC METABOLIC PNL TOTAL CA: CPT | Performed by: NURSE PRACTITIONER

## 2024-11-04 PROCEDURE — 36000704 HC OR TIME LEV I 1ST 15 MIN: Performed by: SPECIALIST

## 2024-11-04 PROCEDURE — 83036 HEMOGLOBIN GLYCOSYLATED A1C: CPT | Performed by: NURSE PRACTITIONER

## 2024-11-04 PROCEDURE — 87186 SC STD MICRODIL/AGAR DIL: CPT | Mod: 59 | Performed by: SPECIALIST

## 2024-11-04 PROCEDURE — 71000033 HC RECOVERY, INTIAL HOUR: Performed by: SPECIALIST

## 2024-11-04 PROCEDURE — 87070 CULTURE OTHR SPECIMN AEROBIC: CPT | Performed by: SPECIALIST

## 2024-11-04 PROCEDURE — 63600175 PHARM REV CODE 636 W HCPCS: Mod: UD | Performed by: NURSE PRACTITIONER

## 2024-11-04 PROCEDURE — 94761 N-INVAS EAR/PLS OXIMETRY MLT: CPT

## 2024-11-04 PROCEDURE — 63600175 PHARM REV CODE 636 W HCPCS: Performed by: NURSE PRACTITIONER

## 2024-11-04 PROCEDURE — 80202 ASSAY OF VANCOMYCIN: CPT | Performed by: STUDENT IN AN ORGANIZED HEALTH CARE EDUCATION/TRAINING PROGRAM

## 2024-11-04 RX ORDER — LIDOCAINE HYDROCHLORIDE 20 MG/ML
INJECTION, SOLUTION EPIDURAL; INFILTRATION; INTRACAUDAL; PERINEURAL
Status: DISCONTINUED | OUTPATIENT
Start: 2024-11-04 | End: 2024-11-04

## 2024-11-04 RX ORDER — MAGNESIUM SULFATE HEPTAHYDRATE 40 MG/ML
2 INJECTION, SOLUTION INTRAVENOUS ONCE
Status: COMPLETED | OUTPATIENT
Start: 2024-11-04 | End: 2024-11-04

## 2024-11-04 RX ORDER — SODIUM CHLORIDE 9 MG/ML
INJECTION, SOLUTION INTRAVENOUS CONTINUOUS
Status: DISCONTINUED | OUTPATIENT
Start: 2024-11-04 | End: 2024-11-06

## 2024-11-04 RX ORDER — FENTANYL CITRATE 50 UG/ML
INJECTION, SOLUTION INTRAMUSCULAR; INTRAVENOUS
Status: DISCONTINUED | OUTPATIENT
Start: 2024-11-04 | End: 2024-11-04

## 2024-11-04 RX ORDER — GLUCAGON 1 MG
1 KIT INJECTION
Status: DISCONTINUED | OUTPATIENT
Start: 2024-11-04 | End: 2024-11-04

## 2024-11-04 RX ORDER — PROPOFOL 10 MG/ML
VIAL (ML) INTRAVENOUS
Status: DISCONTINUED | OUTPATIENT
Start: 2024-11-04 | End: 2024-11-04

## 2024-11-04 RX ORDER — HYDROMORPHONE HYDROCHLORIDE 1 MG/ML
1 INJECTION, SOLUTION INTRAMUSCULAR; INTRAVENOUS; SUBCUTANEOUS ONCE
Status: COMPLETED | OUTPATIENT
Start: 2024-11-04 | End: 2024-11-04

## 2024-11-04 RX ORDER — CLONIDINE HYDROCHLORIDE 0.1 MG/1
0.1 TABLET ORAL EVERY 6 HOURS PRN
Status: DISCONTINUED | OUTPATIENT
Start: 2024-11-04 | End: 2024-11-08 | Stop reason: HOSPADM

## 2024-11-04 RX ORDER — HYDROCODONE BITARTRATE AND ACETAMINOPHEN 5; 325 MG/1; MG/1
1 TABLET ORAL EVERY 4 HOURS PRN
Status: DISCONTINUED | OUTPATIENT
Start: 2024-11-04 | End: 2024-11-05

## 2024-11-04 RX ORDER — SODIUM CHLORIDE 0.9 % (FLUSH) 0.9 %
10 SYRINGE (ML) INJECTION
Status: DISCONTINUED | OUTPATIENT
Start: 2024-11-04 | End: 2024-11-04

## 2024-11-04 RX ORDER — HYDROMORPHONE HYDROCHLORIDE 1 MG/ML
0.2 INJECTION, SOLUTION INTRAMUSCULAR; INTRAVENOUS; SUBCUTANEOUS EVERY 5 MIN PRN
Status: DISCONTINUED | OUTPATIENT
Start: 2024-11-04 | End: 2024-11-04

## 2024-11-04 RX ORDER — MORPHINE SULFATE 2 MG/ML
2 INJECTION, SOLUTION INTRAMUSCULAR; INTRAVENOUS EVERY 4 HOURS PRN
Status: DISCONTINUED | OUTPATIENT
Start: 2024-11-04 | End: 2024-11-05

## 2024-11-04 RX ORDER — MIDAZOLAM HYDROCHLORIDE 1 MG/ML
INJECTION INTRAMUSCULAR; INTRAVENOUS
Status: DISCONTINUED | OUTPATIENT
Start: 2024-11-04 | End: 2024-11-04

## 2024-11-04 RX ORDER — HYDRALAZINE HYDROCHLORIDE 25 MG/1
25 TABLET, FILM COATED ORAL EVERY 8 HOURS
Status: DISCONTINUED | OUTPATIENT
Start: 2024-11-04 | End: 2024-11-08 | Stop reason: HOSPADM

## 2024-11-04 RX ORDER — LISINOPRIL 10 MG/1
10 TABLET ORAL DAILY
Status: DISCONTINUED | OUTPATIENT
Start: 2024-11-04 | End: 2024-11-06

## 2024-11-04 RX ADMIN — MAGNESIUM SULFATE HEPTAHYDRATE 2 G: 40 INJECTION, SOLUTION INTRAVENOUS at 05:11

## 2024-11-04 RX ADMIN — TACROLIMUS 6 MG: 1 CAPSULE ORAL at 05:11

## 2024-11-04 RX ADMIN — HYDROCODONE BITARTRATE AND ACETAMINOPHEN 1 TABLET: 10; 325 TABLET ORAL at 08:11

## 2024-11-04 RX ADMIN — TACROLIMUS 6 MG: 1 CAPSULE ORAL at 09:11

## 2024-11-04 RX ADMIN — HYDROCODONE BITARTRATE AND ACETAMINOPHEN 1 TABLET: 10; 325 TABLET ORAL at 05:11

## 2024-11-04 RX ADMIN — POTASSIUM CHLORIDE 40 MEQ: 1500 TABLET, EXTENDED RELEASE ORAL at 01:11

## 2024-11-04 RX ADMIN — CEFTRIAXONE SODIUM 2 G: 2 INJECTION, POWDER, FOR SOLUTION INTRAMUSCULAR; INTRAVENOUS at 11:11

## 2024-11-04 RX ADMIN — SODIUM CHLORIDE: 9 INJECTION, SOLUTION INTRAVENOUS at 05:11

## 2024-11-04 RX ADMIN — LISINOPRIL 10 MG: 10 TABLET ORAL at 08:11

## 2024-11-04 RX ADMIN — HYDROMORPHONE HYDROCHLORIDE 0.2 MG: 1 INJECTION, SOLUTION INTRAMUSCULAR; INTRAVENOUS; SUBCUTANEOUS at 04:11

## 2024-11-04 RX ADMIN — HYDRALAZINE HYDROCHLORIDE 25 MG: 25 TABLET ORAL at 05:11

## 2024-11-04 RX ADMIN — SODIUM CHLORIDE: 9 INJECTION, SOLUTION INTRAVENOUS at 09:11

## 2024-11-04 RX ADMIN — FENTANYL CITRATE 50 MCG: 50 INJECTION INTRAMUSCULAR; INTRAVENOUS at 03:11

## 2024-11-04 RX ADMIN — MIDAZOLAM HYDROCHLORIDE 2 MG: 1 INJECTION, SOLUTION INTRAMUSCULAR; INTRAVENOUS at 03:11

## 2024-11-04 RX ADMIN — VANCOMYCIN HYDROCHLORIDE 500 MG: 500 INJECTION, POWDER, LYOPHILIZED, FOR SOLUTION INTRAVENOUS at 05:11

## 2024-11-04 RX ADMIN — HYDRALAZINE HYDROCHLORIDE 25 MG: 25 TABLET ORAL at 08:11

## 2024-11-04 RX ADMIN — ONDANSETRON 4 MG: 2 INJECTION INTRAMUSCULAR; INTRAVENOUS at 03:11

## 2024-11-04 RX ADMIN — PROPOFOL 150 MG: 10 INJECTION, EMULSION INTRAVENOUS at 03:11

## 2024-11-04 RX ADMIN — SODIUM CHLORIDE AND POTASSIUM CHLORIDE: .9; .15 SOLUTION INTRAVENOUS at 12:11

## 2024-11-04 RX ADMIN — HYDROCODONE BITARTRATE AND ACETAMINOPHEN 1 TABLET: 10; 325 TABLET ORAL at 01:11

## 2024-11-04 RX ADMIN — MORPHINE SULFATE 2 MG: 2 INJECTION, SOLUTION INTRAMUSCULAR; INTRAVENOUS at 08:11

## 2024-11-04 RX ADMIN — HYDROMORPHONE HYDROCHLORIDE 1 MG: 1 INJECTION, SOLUTION INTRAMUSCULAR; INTRAVENOUS; SUBCUTANEOUS at 11:11

## 2024-11-04 RX ADMIN — LIDOCAINE HYDROCHLORIDE 50 MG: 20 INJECTION, SOLUTION EPIDURAL; INFILTRATION; INTRACAUDAL; PERINEURAL at 03:11

## 2024-11-04 RX ADMIN — HYDROCODONE BITARTRATE AND ACETAMINOPHEN 1 TABLET: 10; 325 TABLET ORAL at 11:11

## 2024-11-04 NOTE — PLAN OF CARE
[facility] [place] Comprehensive Care Unit  Initial Discharge Assessment       Primary Care Provider: Halie Villagran NP    Admission Diagnosis: Liver transplanted [Z94.4]  Chest pain [R07.9]  Breast abscess of female [N61.1]    Admission Date: 11/3/2024  Expected Discharge Date:     Transition of Care Barriers: Homeless    Payor: [place] / Plan: [place] [facility] [employer] [place]AN [place] / Product Type: Managed Medicaid /     Extended Emergency Contact Information  Primary Emergency Contact: Marilee Horton  Mobile Phone: [phone]  Relation: Daughter   needed? No  Secondary Emergency Contact: NHI OLIVAREZ   [facility]  Mobile Phone: [phone]  Relation: Significant other  Mother: nora shrestha  Address: [address]  Home Phone: [phone]    Discharge Plan A: Home  Discharge Plan B: Shelter    DC assessment completed with patient at bedside. Verified information on facesheet as correct. Reports listed address is mailing address. States she is currently homeless. PCP is LISET Villagran. Pharmacy is [facility] in [place]. Denies hh/hd/blood thinners/outpt services. DME- cane. Independent at baseline. Uses cane PRN. Uses medicaid transport for apts. Unsure who will provide transportation home upon DC. Denies recent inpt stay in last 30 days. Reports that she takes home medications as prescribed and can currently afford them. Denies recent inpt stay in last 30 days. CM asked about plan from hospital - if staying with a friend is an option. States she is unsure at this time- did state her friend told her CM at hospital could get her into an apt. I explained that unfortunately we cannot get her into apt upon DC but will ask SW to meet her for resources. States she already has CM with [employer]ud that is supposed to be helping with her an apt but she hasn't heard anything from her in awhile. SW to meet with patient.     Initial  Assessment (most recent)       Adult Discharge Assessment - 11/04/24 0957          Discharge Assessment    Assessment Type Discharge Planning Assessment     Confirmed/corrected address, phone number and insurance Yes     Confirmed Demographics Correct on Facesheet     Source of Information patient     Communicated MARA with patient/caregiver Yes     Reason For Admission abscess     People in Home alone     Do you expect to return to your current living situation? No     Do you have help at home or someone to help you manage your care at home? No     Prior to hospitilization cognitive status: Alert/Oriented     Current cognitive status: Alert/Oriented     Walking or Climbing Stairs Difficulty no     Dressing/Bathing Difficulty no     Equipment Currently Used at Home cane, straight     Readmission within 30 days? No     Patient currently being followed by outpatient case management? No     Do you currently have service(s) that help you manage your care at home? No     Do you take prescription medications? Yes     Do you have prescription coverage? Yes     Do you have any problems affording any of your prescribed medications? No     Is the patient taking medications as prescribed? yes     How do you get to doctors appointments? health plan transportation     Are you on dialysis? No     Do you take coumadin? No     Discharge Plan A Home     Discharge Plan B Shelter     DME Needed Upon Discharge  none     Discharge Plan discussed with: Patient     Transition of Care Barriers Homeless

## 2024-11-04 NOTE — ED NOTES
Returned from CT scan. Urine sample obtained. Patient does express her pain has returned and rates 4-5 on pain scale. MD notified and medications prescribed.

## 2024-11-04 NOTE — INTERVAL H&P NOTE
The patient has been examined and the H&P has been reviewed:    I concur with the findings and no changes have occurred since H&P was written.    Surgery risks, benefits and alternative options discussed and understood by patient/family.          Active Hospital Problems    Diagnosis  POA    *Breast abscess of female [N61.1]  Yes    Hepatitis B [B19.10]  Yes    Cigarette nicotine dependence without complication [F17.210]  Yes    Rheumatoid arthritis [M06.9]  Yes     On humira      S/P liver transplant [Z94.4]  Not Applicable    SHIVAM (acute kidney injury) [N17.9]  Yes      Resolved Hospital Problems   No resolved problems to display.

## 2024-11-04 NOTE — ASSESSMENT & PLAN NOTE
SHIVAM is likely due to  infection . Baseline creatinine is  1.4 . Most recent creatinine and eGFR are listed below.  Recent Labs     11/03/24 2030   CREATININE 2.5*   EGFRNORACEVR 23.4*      Plan  - SHIVAM is worsening. Will continue current treatment  - Avoid nephrotoxins and renally dose meds for GFR listed above  - Monitor urine output, serial BMP, and adjust therapy as needed  - IVF-trend
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
## 2024-11-04 NOTE — ED PROVIDER NOTES
Encounter Date: 11/3/2024       History     Chief Complaint   Patient presents with    Abscess     Presents with abscess to L breast onset 3 weeks.  Seen by primary Dr and advised to go to to the ED. Pt did not and reports now that she has cellulitis, drainage from her nipple and pain.      The patient is a 44-year-old female with a past medical history of liver transplant.  Patient is on immunosuppressants.  Additionally, the patient has history of gastric bypass and hypertension.  Patient comes our facility with complaints of left pain and swelling.  Patient states that she was carrying a box and injured her left breast on October 22nd of this year.  Patient has a small bruise on her left breast.  When she had an appointment with her transplant physician the following day.  She was told to go to the emergency department for concerns over possible abscess formation.  The patient did follow-up with anybody.  Over the last 2 weeks, patient has had a steady increase in swelling to left breast, erythema of breast, pain and she now describes some drainage to the medial aspect of her nipple.  Transplant center called patient in the last 2 days and told her to go the hospital.  Patient denies any fevers or chills.      Review of patient's allergies indicates:   Allergen Reactions    Penicillins Hives and Shortness Of Breath     Past Medical History:   Diagnosis Date    Alcoholic liver failure 2014    Underwent transplant     Anxiety 1999    Bipolar 1 disorder, depressed 2004    Bipolar 1 disorder, depressed     Informed from pt via Patient Portal     CMV (cytomegalovirus)     Donor CMV Status: positive    Elevated liver function tests 8/4/2023    ETOH abuse 08/27/2019    admits to social drinking    Heart attack 2015    s/p transplant complication and extended hospitalization     Hepatic steatosis 2014    Hepatitis B     Donor HBcAb: positive- taking lamivudine    Hypertension 2009    Kidney failure 2015    s/p transplant  complication and extended hospitalization     Lumbago 1999    Macrocytic anemia 2014    Narcotic overdose 04/2018    hospitalized x 2- See 4/30/2018 office note    Osteoporosis 08/2016    Rheumatoid arthritis 5/1/2017    Skin cancer 1999, 2002, 2016    Stroke 2015    s/p transplant complication and extended hospitalization      Past Surgical History:   Procedure Laterality Date    AUGMENTATION OF BREAST Bilateral 2014    saline    BACK SURGERY  2007    L4-L5 - Laminectomy    BELT ABDOMINOPLASTY  2013    BLADDER SUSPENSION  2004    BREAST SURGERY  2014    Breast Augmentation    CHOLECYSTECTOMY  2009    DILATION AND CURETTAGE OF UTERUS      3 times     ERCP N/A 3/25/2021    Procedure: ERCP (ENDOSCOPIC RETROGRADE CHOLANGIOPANCREATOGRAPHY);  Surgeon: Dewayne Vasquez MD;  Location: [facility] ([address] [place] [address]);  Service: Endoscopy;  Laterality: N/A;  Covid-19 test 3/22/21 at [facility], [place], operated by [facility]    GASTRIC BYPASS  2009, 2010    HYSTERECTOMY  2008    LIVER TRANSPLANT  08/26/2015    Complication of liver transplant: dilated ducts suggestive of obstruction    OOPHORECTOMY      SKIN GRAFT  2013, 2012     Family History   Problem Relation Name Age of Onset    Alcohol abuse Father      Depression Father      Arthritis Father      Colon cancer Father  70    Depression Mother      Arthritis Mother      Breast cancer Neg Hx      Ovarian cancer Neg Hx       Social History     Tobacco Use    Smoking status: Every Day     Current packs/day: 0.50     Average packs/day: 0.5 packs/day for 2.0 years (1.0 ttl pk-yrs)     Types: Cigarettes    Smokeless tobacco: Never   Substance Use Topics    Alcohol use: Yes     Comment: Rarely - once monthly    Drug use: Not Currently     Types: Marijuana, Amphetamines     Review of Systems   Constitutional:  Negative for fever.   Skin:  Positive for color change.        Erythema to left breast with fluctuant overlying medial aspect of breast   All other systems reviewed and are negative.      Physical Exam     Initial  Vitals [11/03/24 1950]   BP Pulse Resp Temp SpO2   (!) 202/109 105 18 98.7 °F (37.1 °C) 100 %      MAP       --         Physical Exam    Nursing note and vitals reviewed.  Constitutional: She appears well-developed and well-nourished. She is not diaphoretic. No distress.   HENT:   Head: Normocephalic and atraumatic.   Nose: Nose normal.   Eyes: EOM are normal. Right eye exhibits no discharge. Left eye exhibits no discharge. No scleral icterus.   Neck: Neck supple.   Normal range of motion.  Cardiovascular:  Regular rhythm, normal heart sounds and intact distal pulses.           tachycardic   Pulmonary/Chest: Breath sounds normal. No respiratory distress.   Abdominal: Abdomen is soft. Bowel sounds are normal. She exhibits no distension. There is no abdominal tenderness.   Musculoskeletal:         General: Tenderness present. No edema. Normal range of motion.      Cervical back: Normal range of motion and neck supple.      Comments: Left breast tenderness     Lymphadenopathy:     She has no cervical adenopathy.   Neurological: She is alert and oriented to person, place, and time. She has normal strength. GCS score is 15. GCS eye subscore is 4. GCS verbal subscore is 5. GCS motor subscore is 6.   Skin: Skin is warm. Capillary refill takes less than 2 seconds. Abscess noted. No rash noted. There is erythema. No pallor.   Left breast with erythema to medial breast into some aspect on the lateral aspect.  Swelling with tenderness and fluctuance medial to nipple.  No active drainage at this time.  Erythematous streaking medially towards axilla is noted.   Psychiatric: She has a normal mood and affect. Her behavior is normal. Judgment and thought content normal.         ED Course   I & D - Incision and Drainage    Date/Time: 11/3/2024 10:30 PM  Location procedure was performed: [facility] EMERGENCY DEPARTMENT    Performed by: Moshe Garcia MD  Authorized by: Filiberto Hicks MD  Assisting provider: Moshe Garcia,  MD  Pre-operative diagnosis: Left breast abscess  Post-operative diagnosis: Left breast abscess with cellulitis  Consent Done: Yes  Consent: Verbal consent obtained.  Risks and benefits: risks, benefits and alternatives were discussed  Consent given by: patient  Patient understanding: patient states understanding of the procedure being performed  Patient consent: the patient's understanding of the procedure matches consent given  Procedure consent: procedure consent matches procedure scheduled  Relevant documents: relevant documents present and verified  Test results: test results available and properly labeled  Site marked: the operative site was marked  Imaging studies: imaging studies available  Required items: required blood products, implants, devices, and special equipment available  Patient identity confirmed: verbally with patient, name and   Type: abscess  Body area: trunk  Location details: left breast  Anesthesia: local infiltration    Anesthesia:  Local Anesthetic: lidocaine 1% with epinephrine  Anesthetic total: 6.5 mL    Patient sedated: no  Description of findings: Purulent material was expressed.  8-10 mL   Scalpel size: 11  Incision type: single straight  Incision depth: dermal and subcutaneous  Complexity: simple  Drainage: bloody and purulent  Drainage amount: copious  Wound treatment: incision, wound left open, expression of material, deloculation, wound packed and drainage  Packing material: 1/4 in gauze  Technical procedures used: Sterile technique  Significant surgical tasks conducted by the assistant(s): none  Complications: No  Estimated blood loss (mL): 2  Specimens: Yes (Cultures)  Implants: No  Patient tolerance of procedure: Patient was intolerant of completion of I and D and requested to stop.    Incision depth: dermal and subcutaneous        Labs Reviewed   CBC W/ AUTO DIFFERENTIAL - Abnormal       Result Value    WBC 9.53      RBC 3.05 (*)     Hemoglobin 10.0 (*)     Hematocrit  29.3 (*)     MCV 96      MCH 32.8 (*)     MCHC 34.1      RDW 13.9      Platelets 128 (*)     MPV 10.0      Immature Granulocytes 0.2      Gran # (ANC) 7.7      Immature Grans (Abs) 0.02      Lymph # 1.0      Mono # 0.7      Eos # 0.1      Baso # 0.01      nRBC 0      Gran % 81.0 (*)     Lymph % 10.0 (*)     Mono % 7.8      Eosinophil % 0.9      Basophil % 0.1      Differential Method Automated     COMPREHENSIVE METABOLIC PANEL - Abnormal    Sodium 139      Potassium 2.7 (*)     Chloride 104      CO2 21 (*)     Glucose 61 (*)     BUN 29 (*)     Creatinine 2.5 (*)     Calcium 8.6 (*)     Total Protein 7.6      Albumin 3.5      Total Bilirubin 0.7      Alkaline Phosphatase 239 (*)     AST 29      ALT 18      eGFR 23.4 (*)     Anion Gap 14      Narrative:        critical result(s) called and verbal readback obtained from Oliverio Wyatt RN. by TH3 11/03/2024 21:10   CULTURE, BLOOD   CULTURE, BLOOD   CULTURE, AEROBIC  (SPECIFY SOURCE)   CULTURE, ANAEROBIC   PROTIME-INR    Prothrombin Time 11.0      INR 1.0     APTT    aPTT 31.5     LACTIC ACID, PLASMA    Lactate (Lactic Acid) 1.1     PREGNANCY TEST, URINE RAPID    Preg Test, Ur Negative      Narrative:     Specimen Source->Urine   URINALYSIS, REFLEX TO URINE CULTURE          Imaging Results              CT Chest Without Contrast (Final result)  Result time 11/03/24 21:48:15      Final result by Mike Plascencia MD (11/03/24 21:48:15)                   Impression:      Abnormal complex collection or soft tissue density within the left breast,, anterior to the breast implant and medial to the nipple with dimensions as above.  This is nonspecific and could reflect a breast abscess or phlegmon in the appropriate clinical setting.  Underlying mass or neoplastic process is not excluded.  Clinical correlation with patient history and physical examination is advised.  Recommend short-term follow-up with dedicated breast imaging when clinically appropriate    Scattered pulmonary  micronodules throughout the lungs.  For multiple solid nodules all <6 mm, Fleischner Society 2017 guidelines recommend no routine follow up for a low risk patient, or follow up with non-contrast chest CT at 12 months after discovery in a high risk patient.    Scattered subsegmental opacities at the lung bases, right greater than left.  These may reflect atelectasis or scarring, although early infectious process not excluded, particularly the right lung base.    Additional incidental findings as above.      Electronically signed by: Mike Plascencia MD  Date:    11/03/2024  Time:    21:48               Narrative:    EXAMINATION:  CT CHEST WITHOUT CONTRAST    CLINICAL HISTORY:  Soft tissue mass, chest, US/xray nondiagnostic;left breast abscess ( SHIVAM);    TECHNIQUE:  Low dose axial images, sagittal and coronal reformations were obtained from the thoracic inlet to the lung bases. Contrast was not administered.    COMPARISON:  None.    FINDINGS:  Examination of the vascular and soft tissue structures at the base of the neck is unremarkable, noting assessment is limited by significant streak artifact throughout this region.    There are bilateral breast implants present.  There is an abnormal complex collection or soft tissue density within the medial aspect of the left breast, anterior to the breast implant and medial to the nipple.  This measures approximately 1.9 x 4.6 x 5.0 cm.  There is adjacent fat stranding and suspected mild overlying skin thickening.  No associated soft tissue gas present.  No associated calcification appreciated.    The thoracic aorta maintains normal caliber, contour, and course without significant atherosclerotic calcification within its course.  The heart is not significantly enlarged.  There is trace pericardial fluid.The esophagus maintains a normal course and caliber. There is no bulky axillary or mediastinal lymph node enlargement appreciated allowing for noncontrast technique.    The  trachea is midline and the proximal airways are patent. There is biapical pleuroparenchymal scarring present, right greater than left.  There are scattered pulmonary micronodules throughout the lungs measuring up to 0.4 cm.  There are scattered subsegmental opacities at the lung bases, right greater than left.  No significant pleural fluid.    Limited views of the upper abdomen demonstrate no acute abnormalities.  The gallbladder is surgically absent.  There is mild dilatation of the extrahepatic common bile duct which may relate to post cholecystectomy status.  There are postoperative changes of the stomach..    Osseous structures demonstrate mild degenerative changes..                                       Medications   iohexoL (OMNIPAQUE 350) injection 75 mL (has no administration in time range)   vancomycin (VANCOCIN) 1,000 mg in D5W 250 mL IVPB (admixture device) (1,000 mg Intravenous New Bag 11/3/24 9090)   lamiVUDine tablet 150 mg (has no administration in time range)   multivitamin tablet (has no administration in time range)   pantoprazole EC tablet 40 mg (has no administration in time range)   tacrolimus capsule 6 mg (has no administration in time range)   sodium chloride 0.9% flush 10 mL (has no administration in time range)   albuterol-ipratropium 2.5 mg-0.5 mg/3 mL nebulizer solution 3 mL (has no administration in time range)   melatonin tablet 6 mg (has no administration in time range)   ondansetron injection 4 mg (has no administration in time range)   senna-docusate 8.6-50 mg per tablet 1 tablet (has no administration in time range)   acetaminophen tablet 650 mg (has no administration in time range)   simethicone chewable tablet 80 mg (has no administration in time range)   aluminum-magnesium hydroxide-simethicone 200-200-20 mg/5 mL suspension 30 mL (has no administration in time range)   naloxone 0.4 mg/mL injection 0.02 mg (has no administration in time range)   glucose chewable tablet 16 g (has no  administration in time range)   glucose chewable tablet 24 g (has no administration in time range)   glucagon (human recombinant) injection 1 mg (has no administration in time range)   0.9 % NaCl with KCl 20 mEq infusion ( Intravenous New Bag 11/4/24 0006)   HYDROcodone-acetaminophen 5-325 mg per tablet 1 tablet (has no administration in time range)   HYDROcodone-acetaminophen  mg per tablet 1 tablet (has no administration in time range)   dextrose 10% bolus 125 mL 125 mL (has no administration in time range)   dextrose 10% bolus 250 mL 250 mL (has no administration in time range)   nicotine 21 mg/24 hr 1 patch (1 patch Transdermal Patch Applied 11/3/24 2353)   potassium chloride SA CR tablet 40 mEq (40 mEq Oral Given 11/3/24 2352)   vancomycin - pharmacy to dose (has no administration in time range)   labetalol 20 mg/4 mL (5 mg/mL) IV syring (10 mg Intravenous Given 11/3/24 2353)   cefTRIAXone injection 2 g (has no administration in time range)   HYDROmorphone injection 1 mg (1 mg Intravenous Given 11/3/24 2029)   sodium chloride 0.9% bolus 1,000 mL 1,000 mL (0 mLs Intravenous Stopped 11/3/24 2130)   hydrALAZINE injection 10 mg (10 mg Intravenous Given 11/3/24 2044)   dextrose 5 % and 0.45 % NaCl infusion (0 mLs Intravenous Stopped 11/4/24 0004)   potassium chloride SA CR tablet 40 mEq (40 mEq Oral Given 11/3/24 2118)   HYDROmorphone injection 1 mg (1 mg Intravenous Given 11/3/24 2131)   LIDOcaine-EPINEPHrine (PF) 1%-1:200,000 injection 10 mL (10 mLs Other Given 11/3/24 2156)   HYDROmorphone injection 1 mg (1 mg Intravenous Given 11/3/24 2227)   HYDROmorphone injection 1 mg (1 mg Intravenous Given 11/3/24 2227)   cefTRIAXone injection 1 g (1 g Intravenous Given 11/3/24 2302)     Medical Decision Making  Patient comes our facility with left breast pain and swelling with erythema.  Patient had noted some swelling and tenderness after some minor trauma to her left breast on October 22nd.  Patient was advised to  go to the hospital to be admitted.  The patient did not do so.  Over the following 2 weeks, the patient has had increasing swelling, erythema pain in her left breast.  Patient states there was some drainage from her nipple that looked purulent.  Patient's transplant physician team finally told her to come to the ER to be admitted.  Patient did so today.    Patient clinically has abscess to left breast on medial aspect.  There was fluctuance with cellulitic changes underlying medial left breast.  There is some erythematous streaking medially to axilla.  I obtained a a CT scan to see the extent of hospital abscess formation.  Does not appear that the abscess extended around or to the breast implant that the patient has.  Labs indicated that the patient had an SHIVAM with a creatinine of 2.5.  I did not use contrast as I did not want to in salt the kidneys further.  CT read was not definitive for abscess.  They could not rule in or rule out phlegmon.  Patient consented to I&D.  Initially after patient was prepped and draped, needle aspiration with 18 gauge needle was attempted.  Purulent material was aspirated.  After that, I and D with an 11 blade was performed.  1% lidocaine with epinephrine was used both prior to the needle aspiration as well as further local anesthetic was used for I&D.  Patient was very intolerant of the I&D.  I was able to express copious amounts of purulent material.  I estimate about a 210 mL of purulent material if not more was expressed.  Cultures were obtained.  Patient was placed.  Given vancomycin and Rocephin.  I was not able to fully explore abscess and break up the loculations.    Patient was admitted by hospitalist service for continued IV antibiotics.  Expressed that surgery will likely need to evaluate the patient as she may need a secondary I&D.  The patient's breast implant also could complicate the infection.    Amount and/or Complexity of Data Reviewed  Labs: ordered.  Radiology:  ordered.    Risk  Prescription drug management.  Decision regarding hospitalization.                                      Clinical Impression:  Final diagnoses:  [N61.1] Breast abscess of female  [Z94.4] Liver transplanted (Primary)  [R07.9] Chest pain          ED Disposition Condition    Admit                 Moshe Garcia MD  11/04/24 0037

## 2024-11-04 NOTE — HPI
Jose Berman is a 45 y/o F with past medical history significant for liver transplant, hep B, bipolar disorder, RA and HTN who presented to the ED with c/o left breast pain and swelling which began on October 22 when she injured her left breast while carrying a box.  She was seen by her transplant team the following day and was advised to go to the ED for concerns over possible abscess formation; however, she did not follow up with anyone at that time.  Over the last 2 weeks she has had progressive swelling and erythema to the left breast and now reports some drainage from the site.  Denies fever, chills, N/V or any other symptoms.  While in the ED, an I&D was performed at bedside with cultures collected.  She was started on IV rocephin and IV vancomycin.  Labs are significant for SHIVAM and hypokalemia.  She is admitted to the service of hospital medicine for further medical management.

## 2024-11-04 NOTE — PROGRESS NOTES
Pharmacokinetic Assessment Follow Up: IV Vancomycin    Vancomycin serum concentration assessment(s):    The random level was drawn correctly and can be used to guide therapy at this time. The measurement is within the desired definitive target range of 10 to 15 mcg/mL.    Vancomycin Regimen Plan:    Change regimen to Vancomycin 500 mg IV every 24 hours with next serum trough concentration measured at 1430 prior to next dose on 11/05    Drug levels (last 3 results):  Recent Labs   Lab Result Units 11/04/24  1123   Vancomycin, Random ug/mL 13.3       Pharmacy will continue to follow and monitor vancomycin.    Please contact pharmacy at extension [phone] for questions regarding this assessment.    Thank you for the consult,   Truman Orellana       Patient brief summary:  Jose Berman is a 44 y.o. female initiated on antimicrobial therapy with IV Vancomycin for treatment of skin & soft tissue infection    The patient's current regimen is vancomycin 1000mg Pulse Dosed    Drug Allergies:   Review of patient's allergies indicates:   Allergen Reactions    Penicillins Hives and Shortness Of Breath       Actual Body Weight:   51.1 Kg    Renal Function:   Estimated Creatinine Clearance: 27 mL/min (A) (based on SCr of 2.1 mg/dL (H)).,     Dialysis Method (if applicable):  N/A    CBC (last 72 hours):  Recent Labs   Lab Result Units 11/03/24  2030 11/04/24  0336   WBC K/uL 9.53 9.77   Hemoglobin g/dL 10.0* 9.2*   Hemoglobin A1C %  --  4.4   Hematocrit % 29.3* 26.3*   Platelets K/uL 128* 109*   Gran % % 81.0* 83.8*   Lymph % % 10.0* 6.4*   Mono % % 7.8 9.0   Eosinophil % % 0.9 0.3   Basophil % % 0.1 0.2   Differential Method  Automated Automated       Metabolic Panel (last 72 hours):  Recent Labs   Lab Result Units 11/03/24  2030 11/04/24  0058 11/04/24  0336   Sodium mmol/L 139  --  136   Potassium mmol/L 2.7*  --  3.6   Chloride mmol/L 104  --  107   CO2 mmol/L 21*  --  18*   Glucose mg/dL 61*  --  106   Glucose, UA   --   Negative  --    BUN mg/dL 29*  --  25*   Creatinine mg/dL 2.5*  --  2.1*   Albumin g/dL 3.5  --   --    Total Bilirubin mg/dL 0.7  --   --    Alkaline Phosphatase U/L 239*  --   --    AST U/L 29  --   --    ALT U/L 18  --   --    Magnesium mg/dL  --   --  1.5*       Vancomycin Administrations:  vancomycin given in the last 96 hours                     vancomycin (VANCOCIN) 1,000 mg in D5W 250 mL IVPB (admixture device) (mg) 1,000 mg New Bag 11/03/24 2311                    Microbiologic Results:  Microbiology Results (last 7 days)       Procedure Component Value Units Date/Time    Aerobic culture [[id number]] Collected: 11/03/24 2258    Order Status: Sent Specimen: Abscess from Breast, Left Updated: 11/04/24 1156    Culture, Anaerobic [[id number]] Collected: 11/03/24 2258    Order Status: Sent Specimen: Abscess from Breast, Left Updated: 11/04/24 1156    Blood Culture #1 **CANNOT BE ORDERED STAT** [[id number]] Collected: 11/03/24 2032    Order Status: Sent Specimen: Blood from Peripheral, Forearm, Left Updated: 11/04/24 1139    Blood Culture #2 **CANNOT BE ORDERED STAT** [[id number]] Collected: 11/03/24 2031    Order Status: Sent Specimen: Blood from Peripheral, Forearm, Right Updated: 11/04/24 1139    Urine culture [[id number]] Collected: 11/04/24 0058    Order Status: No result Specimen: Urine Updated: 11/04/24 0115

## 2024-11-04 NOTE — ANESTHESIA PROCEDURE NOTES
Intubation    Date/Time: 11/4/2024 3:37 PM    Performed by: Aleja De Leon CRNA  Authorized by: Aleja De Leon CRNA    Intubation:     Induction:  Intravenous    Intubated:  Postinduction    Mask Ventilation:  Easy mask    Attempts:  1    Attempted By:  CRNA    Difficult Airway Encountered?: No      Complications:  None    Airway Device:  Supraglottic airway/LMA    Airway Device Size:  3.0 (igel)    Secured at:  The lips    Placement Verified By:  Capnometry    Complicating Factors:  None    Findings Post-Intubation:  BS equal bilateral and atraumatic/condition of teeth unchanged

## 2024-11-04 NOTE — ANESTHESIA POSTPROCEDURE EVALUATION
Anesthesia Post Evaluation    Patient: Jose Berman    Procedure(s) Performed: Procedure(s) (LRB):  INCISION AND DRAINAGE, ABSCESS (Left)    Final Anesthesia Type: general      Patient location during evaluation: PACU  Patient participation: Yes- Able to Participate  Level of consciousness: awake and alert  Post-procedure vital signs: reviewed and stable  Pain management: adequate  Airway patency: patent    PONV status at discharge: No PONV  Anesthetic complications: no      Cardiovascular status: hemodynamically stable  Respiratory status: unassisted  Hydration status: euvolemic  Follow-up not needed.              Vitals Value Taken Time   /85 11/04/24 1627   Temp 36.5 °C (97.7 °F) 11/04/24 1605   Pulse 70 11/04/24 1629   Resp 13 11/04/24 1629   SpO2 100 % 11/04/24 1629   Vitals shown include unfiled device data.      No case tracking events are documented in the log.      Pain/Fredi Score: Pain Rating Prior to Med Admin: 10 (11/4/2024  4:27 PM)  Pain Rating Post Med Admin: 6 (11/4/2024  9:27 AM)  Fredi Score: 8 (11/4/2024  4:20 PM)

## 2024-11-04 NOTE — ASSESSMENT & PLAN NOTE
Began on Oct 22 after injury to left breast with progressive worsening   I&D performed in ED with cultures pending  IV vancomycin and IV ceftriaxone started.  Continue  Consult general surgery  NPO after MN  Pain control

## 2024-11-04 NOTE — TRANSFER OF CARE
"Anesthesia Transfer of Care Note    Patient: Jose Berman    Procedure(s) Performed: Procedure(s) (LRB):  INCISION AND DRAINAGE, ABSCESS (Left)    Patient location: PACU    Anesthesia Type: general    Transport from OR: Transported from OR on 6-10 L/min O2 by face mask with adequate spontaneous ventilation    Post pain: adequate analgesia    Post assessment: no apparent anesthetic complications and tolerated procedure well    Post vital signs: stable    Level of consciousness: awake and responds to stimulation    Nausea/Vomiting: no nausea/vomiting    Complications: none    Transfer of care protocol was followed      Last vitals: Visit Vitals  BP (!) 188/98 (Patient Position: Lying)   Pulse 66   Temp 36.8 °C (98.3 °F) (Temporal)   Resp 16   Ht 5' 7" (1.702 m)   Wt 51.3 kg (113 lb)   SpO2 100%   Breastfeeding No   BMI 17.70 kg/m²     "

## 2024-11-04 NOTE — H&P
[facility] Medicine  History & Physical    Patient Name: Jose Berman  MRN: [id number]  Admission Date: 11/3/2024  Attending Physician: Filiberto Hicks MD   Primary Care Provider: Halie Villagran NP         Patient information was obtained from patient and ER records.       Subjective:     Principal Problem:Breast abscess of female    Chief Complaint:   Chief Complaint   Patient presents with    Abscess     Presents with abscess to L breast onset 3 weeks.  Seen by primary Dr and advised to go to to the ED. Pt did not and reports now that she has cellulitis, drainage from her nipple and pain.         HPI: Jose Berman is a 45 y/o F with past medical history significant for liver transplant, hep B, bipolar disorder, RA and HTN who presented to the ED with c/o left breast pain and swelling which began on October 22 when she injured her left breast while carrying a box.  She was seen by her transplant team the following day and was advised to go to the ED for concerns over possible abscess formation; however, she did not follow up with anyone at that time.  Over the last 2 weeks she has had progressive swelling and erythema to the left breast and now reports some drainage from the site.  Denies fever, chills, N/V or any other symptoms.  While in the ED, an I&D was performed at bedside with cultures collected.  She was started on IV rocephin and IV vancomycin.  Labs are significant for SHIVAM and hypokalemia.  She is admitted to the service of hospital medicine for further medical management.    Past Medical History:   Diagnosis Date    Alcoholic liver failure 2014    Underwent transplant     Anxiety 1999    Bipolar 1 disorder, depressed 2004    Bipolar 1 disorder, depressed     Informed from pt via Patient Portal     CMV (cytomegalovirus)     Donor CMV Status: positive    Elevated liver function tests 8/4/2023    ETOH abuse 08/27/2019    admits to social drinking     Heart attack 2015    s/p transplant complication and extended hospitalization     Hepatic steatosis 2014    Hepatitis B     Donor HBcAb: positive- taking lamivudine    Hypertension 2009    Kidney failure 2015    s/p transplant complication and extended hospitalization     Lumbago 1999    Macrocytic anemia 2014    Narcotic overdose 04/2018    hospitalized x 2- See 4/30/2018 office note    Osteoporosis 08/2016    Rheumatoid arthritis 5/1/2017    Skin cancer 1999, 2002, 2016    Stroke 2015    s/p transplant complication and extended hospitalization        Past Surgical History:   Procedure Laterality Date    AUGMENTATION OF BREAST Bilateral 2014    saline    BACK SURGERY  2007    L4-L5 - Laminectomy    BELT ABDOMINOPLASTY  2013    BLADDER SUSPENSION  2004    BREAST SURGERY  2014    Breast Augmentation    CHOLECYSTECTOMY  2009    DILATION AND CURETTAGE OF UTERUS      3 times     ERCP N/A 3/25/2021    Procedure: ERCP (ENDOSCOPIC RETROGRADE CHOLANGIOPANCREATOGRAPHY);  Surgeon: Dewayne Vasquez MD;  Location: [facility] ([address] [place] [address]);  Service: Endoscopy;  Laterality: N/A;  Covid-19 test 3/22/21 at [facility]    GASTRIC BYPASS  2009, 2010    HYSTERECTOMY  2008    LIVER TRANSPLANT  08/26/2015    Complication of liver transplant: dilated ducts suggestive of obstruction    OOPHORECTOMY      SKIN GRAFT  2013, 2012       Review of patient's allergies indicates:   Allergen Reactions    Penicillins Hives and Shortness Of Breath       No current facility-administered medications on file prior to encounter.     Current Outpatient Medications on File Prior to Encounter   Medication Sig    albuterol (PROAIR HFA) 90 mcg/actuation inhaler Inhale 2 puffs into the lungs every 6 (six) hours as needed for Wheezing.    lamiVUDine (EPIVIR) 150 MG Tab Take 1 tablet (150 mg total) by mouth once daily.    lisinopriL-hydrochlorothiazide (PRINZIDE,ZESTORETIC) 10-12.5 mg per tablet Take 1 tablet by mouth once daily.    multivitamin (THERAGRAN) tablet  Take 1 tablet by mouth once daily.    pantoprazole (PROTONIX) 40 MG tablet Take 1 tablet (40 mg total) by mouth once daily.    tacrolimus (PROGRAF) 1 MG Cap Take 6 capsules (6 mg total) by mouth every 12 (twelve) hours.    ursodioL (ACTIGALL) 500 MG tablet Take 1 tablet (500 mg total) by mouth 2 (two) times daily.     Family History       Problem Relation (Age of Onset)    Alcohol abuse Father    Arthritis Father, Mother    Colon cancer Father (70)    Depression Father, Mother          Tobacco Use    Smoking status: Every Day     Current packs/day: 0.50     Average packs/day: 0.5 packs/day for 2.0 years (1.0 ttl pk-yrs)     Types: Cigarettes    Smokeless tobacco: Never   Substance and Sexual Activity    Alcohol use: Yes     Comment: Rarely - once monthly    Drug use: Not Currently     Types: Marijuana, Amphetamines    Sexual activity: Yes     Partners: Male     Review of Systems   Constitutional:  Negative for chills and fever.   Respiratory:  Negative for cough, shortness of breath and wheezing.    Cardiovascular:  Negative for chest pain and palpitations.   Gastrointestinal:  Negative for abdominal pain, diarrhea, nausea and vomiting.   Genitourinary:  Negative for dysuria and hematuria.   Skin:  Positive for color change.   Neurological:  Negative for syncope and weakness.   Psychiatric/Behavioral:  Negative for agitation and confusion.    All other systems reviewed and are negative.    Objective:     Vital Signs (Most Recent):  Temp: 98.7 °F (37.1 °C) (11/03/24 1950)  Pulse: 95 (11/03/24 2120)  Resp: 20 (11/03/24 2227)  BP: (!) 183/112 (11/03/24 2120)  SpO2: 100 % (11/03/24 2120) Vital Signs (24h Range):  Temp:  [98.7 °F (37.1 °C)] 98.7 °F (37.1 °C)  Pulse:  [] 95  Resp:  [18-21] 20  SpO2:  [100 %] 100 %  BP: (177-202)/(109-123) 183/112     Weight: 51.3 kg (113 lb)  Body mass index is 17.7 kg/m².     Physical Exam  Vitals and nursing note reviewed.   Constitutional:       General: She is not in acute  distress.     Appearance: She is well-developed. She is not toxic-appearing.      Comments: Thin, frail appearing   Cardiovascular:      Rate and Rhythm: Tachycardia present.   Pulmonary:      Effort: Pulmonary effort is normal. No respiratory distress.   Musculoskeletal:         General: Normal range of motion.      Cervical back: Normal range of motion and neck supple.   Skin:     Comments: Dressing to left breast   Neurological:      General: No focal deficit present.      Mental Status: She is alert and oriented to person, place, and time.   Psychiatric:         Mood and Affect: Mood normal.         Behavior: Behavior normal.         Thought Content: Thought content normal.                Significant Labs: All pertinent labs within the past 24 hours have been reviewed.  CBC:   Recent Labs   Lab 11/03/24 2030   WBC 9.53   HGB 10.0*   HCT 29.3*   *     CMP:   Recent Labs   Lab 11/03/24 2030      K 2.7*      CO2 21*   GLU 61*   BUN 29*   CREATININE 2.5*   CALCIUM 8.6*   PROT 7.6   ALBUMIN 3.5   BILITOT 0.7   ALKPHOS 239*   AST 29   ALT 18   ANIONGAP 14     Lactic Acid:   Recent Labs   Lab 11/03/24 2030   LACTATE 1.1       Significant Imaging: I have reviewed all pertinent imaging results/findings within the past 24 hours.  CT chest:  Abnormal complex collection or soft tissue density within the left breast,, anterior to the breast implant and medial to the nipple with dimensions as above.  This is nonspecific and could reflect a breast abscess or phlegmon in the appropriate clinical setting.  Underlying mass or neoplastic process is not excluded.  Clinical correlation with patient history and physical examination is advised.  Recommend short-term follow-up with dedicated breast imaging when clinically appropriate     Scattered pulmonary micronodules throughout the lungs.  For multiple solid nodules all <6 mm, Fleischner Society 2017 guidelines recommend no routine follow up for a low risk  patient, or follow up with non-contrast chest CT at 12 months after discovery in a high risk patient.     Scattered subsegmental opacities at the lung bases, right greater than left.  These may reflect atelectasis or scarring, although early infectious process not excluded, particularly the right lung base.    Assessment/Plan:     * Breast abscess of female  Began on Oct 22 after injury to left breast with progressive worsening   I&D performed in ED with cultures pending  IV vancomycin and IV ceftriaxone started.  Continue  Consult general surgery  NPO after MN  Pain control      Cigarette nicotine dependence without complication  Dangers of cigarette smoking were reviewed with patient in detail. Patient was Counseled for 3-10 minutes. Nicotine replacement options were discussed. Nicotine replacement was discussed- prescribed    Hepatitis B  Chronic  Continue lamivudine      Rheumatoid arthritis  Noted  No longer on Humira    S/P liver transplant  S/p liver transplant 2015  Reports compliance with meds-continue  Monitor LFTs      SHIVAM       SHIVAM is likely due to  unclear etiology . Baseline creatinine is  1.4 . Most recent creatinine and eGFR are listed below.      Recent Labs     11/03/24 2030   CREATININE 2.5*   EGFRNORACEVR 23.4*       Plan  - SHIVAM is worsening. Will continue current treatment  - Avoid nephrotoxins and renally dose meds for GFR listed above  - Monitor urine output, serial BMP, and adjust therapy as needed  - [facility]-trend      VTE Risk Mitigation (From admission, onward)           Ordered     Reason for No Pharmacological VTE Prophylaxis  Once        Question:  Reasons:  Answer:  Risk of Bleeding    11/03/24 2332     IP VTE HIGH RISK PATIENT  Once         11/03/24 2332     Place sequential compression device  Until discontinued         11/03/24 2332                         The attending portion of this evaluation, treatment, and documentation was performed per JENNYFER Dao via Telemedicine  AudioVisual using the secure [employer] software platform with 2 way audio/video. The provider was located off-site and the patient is located in the hospital. The aforementioned video software was utilized to document the relevant history and physical exam            JENNYFER Dao  Department of [facility] Medicine   [facility] - Emergency Dept

## 2024-11-04 NOTE — PROGRESS NOTES
"Pharmacokinetic Initial Assessment: IV Vancomycin    Assessment/Plan:    Initiate intravenous vancomycin with loading dose of 1000 mg once with subsequent doses when random concentrations are less than 20 mcg/mL  Desired empiric serum trough concentration is 10 to 20 mcg/mL  Draw vancomycin random level on 11/4 at 1100.  Pharmacy will continue to follow and monitor vancomycin.      Please contact pharmacy with any questions regarding this assessment.     Thank you for the consult,   Alisha Mcrae       Patient brief summary:  Jose Berman is a 44 y.o. female initiated on antimicrobial therapy with IV Vancomycin for treatment of suspected skin & soft tissue infection    Drug Allergies:   Review of patient's allergies indicates:   Allergen Reactions    Penicillins Hives and Shortness Of Breath       Actual Body Weight:   51.3kg    Renal Function:   Estimated Creatinine Clearance: 22.8 mL/min (A) (based on SCr of 2.5 mg/dL (H)).,     Dialysis Method (if applicable):  N/A    CBC (last 72 hours):  Recent Labs   Lab Result Units 11/03/24  2030   WBC K/uL 9.53   Hemoglobin g/dL 10.0*   Hematocrit % 29.3*   Platelets K/uL 128*   Gran % % 81.0*   Lymph % % 10.0*   Mono % % 7.8   Eosinophil % % 0.9   Basophil % % 0.1   Differential Method  Automated       Metabolic Panel (last 72 hours):  Recent Labs   Lab Result Units 11/03/24  2030   Sodium mmol/L 139   Potassium mmol/L 2.7*   Chloride mmol/L 104   CO2 mmol/L 21*   Glucose mg/dL 61*   BUN mg/dL 29*   Creatinine mg/dL 2.5*   Albumin g/dL 3.5   Total Bilirubin mg/dL 0.7   Alkaline Phosphatase U/L 239*   AST U/L 29   ALT U/L 18       Drug levels (last 3 results):  No results for input(s): "VANCOMYCINRA", "VANCORANDOM", "VANCOMYCINPE", "VANCOPEAK", "VANCOMYCINTR", "VANCOTROUGH" in the last 72 hours.    Microbiologic Results:  Microbiology Results (last 7 days)       Procedure Component Value Units Date/Time    Aerobic culture [[id number]] Collected: 11/03/24 3748    Order " Status: Sent Specimen: Abscess from Breast, Left Updated: 11/03/24 2302    Culture, Anaerobic [[id number]] Collected: 11/03/24 2258    Order Status: Sent Specimen: Abscess from Breast, Left Updated: 11/03/24 2300    Blood Culture #1 **CANNOT BE ORDERED STAT** [[id number]] Collected: 11/03/24 2032    Order Status: Sent Specimen: Blood from Peripheral, Forearm, Left     Blood Culture #2 **CANNOT BE ORDERED STAT** [[id number]] Collected: 11/03/24 2031    Order Status: Sent Specimen: Blood from Peripheral, Forearm, Right

## 2024-11-04 NOTE — SUBJECTIVE & OBJECTIVE
Past Medical History:   Diagnosis Date    Alcoholic liver failure 2014    Underwent transplant     Anxiety 1999    Bipolar 1 disorder, depressed 2004    Bipolar 1 disorder, depressed     Informed from pt via Patient Portal     CMV (cytomegalovirus)     Donor CMV Status: positive    Elevated liver function tests 8/4/2023    ETOH abuse 08/27/2019    admits to social drinking    Heart attack 2015    s/p transplant complication and extended hospitalization     Hepatic steatosis 2014    Hepatitis B     Donor HBcAb: positive- taking lamivudine    Hypertension 2009    Kidney failure 2015    s/p transplant complication and extended hospitalization     Lumbago 1999    Macrocytic anemia 2014    Narcotic overdose 04/2018    hospitalized x 2- See 4/30/2018 office note    Osteoporosis 08/2016    Rheumatoid arthritis 5/1/2017    Skin cancer 1999, 2002, 2016    Stroke 2015    s/p transplant complication and extended hospitalization        Past Surgical History:   Procedure Laterality Date    AUGMENTATION OF BREAST Bilateral 2014    saline    BACK SURGERY  2007    L4-L5 - Laminectomy    BELT ABDOMINOPLASTY  2013    BLADDER SUSPENSION  2004    BREAST SURGERY  2014    Breast Augmentation    CHOLECYSTECTOMY  2009    DILATION AND CURETTAGE OF UTERUS      3 times     ERCP N/A 3/25/2021    Procedure: ERCP (ENDOSCOPIC RETROGRADE CHOLANGIOPANCREATOGRAPHY);  Surgeon: Dewayne Vasquez MD;  Location: [facility] ([address] [place] [address]);  Service: Endoscopy;  Laterality: N/A;  Covid-19 test 3/22/21 at [facility]    GASTRIC BYPASS  2009, 2010    HYSTERECTOMY  2008    LIVER TRANSPLANT  08/26/2015    Complication of liver transplant: dilated ducts suggestive of obstruction    OOPHORECTOMY      SKIN GRAFT  2013, 2012       Review of patient's allergies indicates:   Allergen Reactions    Penicillins Hives and Shortness Of Breath       No current facility-administered medications on file prior to encounter.     Current Outpatient Medications on File Prior to  Encounter   Medication Sig    albuterol (PROAIR HFA) 90 mcg/actuation inhaler Inhale 2 puffs into the lungs every 6 (six) hours as needed for Wheezing.    lamiVUDine (EPIVIR) 150 MG Tab Take 1 tablet (150 mg total) by mouth once daily.    lisinopriL-hydrochlorothiazide (PRINZIDE,ZESTORETIC) 10-12.5 mg per tablet Take 1 tablet by mouth once daily.    multivitamin (THERAGRAN) tablet Take 1 tablet by mouth once daily.    pantoprazole (PROTONIX) 40 MG tablet Take 1 tablet (40 mg total) by mouth once daily.    tacrolimus (PROGRAF) 1 MG Cap Take 6 capsules (6 mg total) by mouth every 12 (twelve) hours.    ursodioL (ACTIGALL) 500 MG tablet Take 1 tablet (500 mg total) by mouth 2 (two) times daily.     Family History       Problem Relation (Age of Onset)    Alcohol abuse Father    Arthritis Father, Mother    Colon cancer Father (70)    Depression Father, Mother          Tobacco Use    Smoking status: Every Day     Current packs/day: 0.50     Average packs/day: 0.5 packs/day for 2.0 years (1.0 ttl pk-yrs)     Types: Cigarettes    Smokeless tobacco: Never   Substance and Sexual Activity    Alcohol use: Yes     Comment: Rarely - once monthly    Drug use: Not Currently     Types: Marijuana, Amphetamines    Sexual activity: Yes     Partners: Male     Review of Systems   Constitutional:  Negative for chills and fever.   Respiratory:  Negative for cough, shortness of breath and wheezing.    Cardiovascular:  Negative for chest pain and palpitations.   Gastrointestinal:  Negative for abdominal pain, diarrhea, nausea and vomiting.   Genitourinary:  Negative for dysuria and hematuria.   Skin:  Positive for color change.   Neurological:  Negative for syncope and weakness.   Psychiatric/Behavioral:  Negative for agitation and confusion.    All other systems reviewed and are negative.    Objective:     Vital Signs (Most Recent):  Temp: 98.7 °F (37.1 °C) (11/03/24 1950)  Pulse: 95 (11/03/24 2120)  Resp: 20 (11/03/24 2227)  BP: (!) 183/112  (11/03/24 2120)  SpO2: 100 % (11/03/24 2120) Vital Signs (24h Range):  Temp:  [98.7 °F (37.1 °C)] 98.7 °F (37.1 °C)  Pulse:  [] 95  Resp:  [18-21] 20  SpO2:  [100 %] 100 %  BP: (177-202)/(109-123) 183/112     Weight: 51.3 kg (113 lb)  Body mass index is 17.7 kg/m².     Physical Exam  Vitals and nursing note reviewed.   Constitutional:       General: She is not in acute distress.     Appearance: She is well-developed. She is not toxic-appearing.      Comments: Thin, frail appearing   Cardiovascular:      Rate and Rhythm: Tachycardia present.   Pulmonary:      Effort: Pulmonary effort is normal. No respiratory distress.   Abdominal:      General: Bowel sounds are normal. There is no distension.      Palpations: Abdomen is soft.      Tenderness: There is no abdominal tenderness.   Musculoskeletal:         General: Normal range of motion.      Cervical back: Normal range of motion and neck supple.   Skin:     Comments: Dressing to left breast   Neurological:      General: No focal deficit present.      Mental Status: She is alert and oriented to person, place, and time.   Psychiatric:         Mood and Affect: Mood normal.         Behavior: Behavior normal.         Thought Content: Thought content normal.                Significant Labs: All pertinent labs within the past 24 hours have been reviewed.  CBC:   Recent Labs   Lab 11/03/24 2030   WBC 9.53   HGB 10.0*   HCT 29.3*   *     CMP:   Recent Labs   Lab 11/03/24 2030      K 2.7*      CO2 21*   GLU 61*   BUN 29*   CREATININE 2.5*   CALCIUM 8.6*   PROT 7.6   ALBUMIN 3.5   BILITOT 0.7   ALKPHOS 239*   AST 29   ALT 18   ANIONGAP 14     Lactic Acid:   Recent Labs   Lab 11/03/24 2030   LACTATE 1.1       Significant Imaging: I have reviewed all pertinent imaging results/findings within the past 24 hours.  CT chest:  Abnormal complex collection or soft tissue density within the left breast,, anterior to the breast implant and medial to the nipple  with dimensions as above.  This is nonspecific and could reflect a breast abscess or phlegmon in the appropriate clinical setting.  Underlying mass or neoplastic process is not excluded.  Clinical correlation with patient history and physical examination is advised.  Recommend short-term follow-up with dedicated breast imaging when clinically appropriate     Scattered pulmonary micronodules throughout the lungs.  For multiple solid nodules all <6 mm, Fleischner Society 2017 guidelines recommend no routine follow up for a low risk patient, or follow up with non-contrast chest CT at 12 months after discovery in a high risk patient.     Scattered subsegmental opacities at the lung bases, right greater than left.  These may reflect atelectasis or scarring, although early infectious process not excluded, particularly the right lung base.

## 2024-11-04 NOTE — OP NOTE
Patient: Jose Berman     Date of Procedure: 11/4/2024    Procedure:  Incision and drainage of the left breast abscess    Surgeon: Marcos Anderson MD    Assistant: None    Pre-op Diagnosis: Left breast abscess [N61.1]     Post-op Diagnosis: Left breast abscess [N61.1]    Procedure in Detail:  After informed consent was obtained, consent form signed, and questions answered, the surgical site was identified and marked appropriately.  The patient was then taken to the operating room where general anesthesia was induced. Prophylactic IV antibiotics were administered.  The patient was positioned and the surgical field was prepped and draped in the usual sterile fashion. A thorough time-out procedure was performed with the surgical team.    Small draining pocket medial aspect of the area older complex and inferiorly was probed.  Cultures were obtained.  This abscess pocket tracked superiorly.  This was opened up a proximally 3-1/2 cm in length.  Abscess pocket then was irrigated and evaluated.  There was no evidence of exposed implant i.e. breast implant exposed in the wound.  Just the capsule pocket.  Wound was irrigated with saline.  Wet-to-dry packings were made with 1 in iodoform gauze.  Sterile dressings were applied.  Patient was brought to the recovery room extubated in hemodynamically stable condition.    Dressings were applied and the patient was awakened and transferred to the recovery room in stable condition. There were no immediate complications.    Specimen:  Culture sent for evaluation    EBL:  Less than 5 cc    Complications: None    This note was created using [employer] direct voice recognition software. Note may have occasional typographical errors that may not have been identified and edited despite initial review prior to signing.

## 2024-11-04 NOTE — ED NOTES
Resting more comfortably in no acute distress. Blood pressure slightly improved. Does state pain approx 4 on pain scale but denies any needs at this time.

## 2024-11-04 NOTE — PLAN OF CARE
Problem: Adult Inpatient Plan of Care  Goal: Plan of Care Review  Outcome: Progressing  Goal: Patient-Specific Goal (Individualized)  Outcome: Progressing  Goal: Absence of Hospital-Acquired Illness or Injury  Outcome: Progressing  Goal: Optimal Comfort and Wellbeing  Outcome: Progressing  Goal: Readiness for Transition of Care  Outcome: Progressing     Problem: Skin or Soft Tissue Infection  Goal: Absence of Infection Signs and Symptoms  Outcome: Not Progressing     Problem: Pain Acute  Goal: Optimal Pain Control and Function  Outcome: Not Progressing     Problem: Hypertension Acute  Goal: Blood Pressure Within Desired Range  Outcome: Not Progressing

## 2024-11-04 NOTE — PLAN OF CARE
Problem: Adult Inpatient Plan of Care  Goal: Plan of Care Review  Outcome: Progressing  Goal: Patient-Specific Goal (Individualized)  Outcome: Progressing  Goal: Absence of Hospital-Acquired Illness or Injury  Outcome: Progressing  Goal: Optimal Comfort and Wellbeing  Outcome: Progressing  Goal: Readiness for Transition of Care  Outcome: Progressing     Problem: Skin or Soft Tissue Infection  Goal: Absence of Infection Signs and Symptoms  Outcome: Progressing     Problem: Pain Acute  Goal: Optimal Pain Control and Function  Outcome: Progressing     Problem: Hypertension Acute  Goal: Blood Pressure Within Desired Range  Outcome: Progressing     Problem: Infection  Goal: Absence of Infection Signs and Symptoms  Outcome: Progressing     Problem:  Fall Injury Risk  Goal: Absence of Fall, Infant Drop and Related Injury  Outcome: Progressing     Problem: Wound  Goal: Optimal Coping  Outcome: Progressing  Goal: Optimal Functional Ability  Outcome: Progressing  Goal: Absence of Infection Signs and Symptoms  Outcome: Progressing  Goal: Improved Oral Intake  Outcome: Progressing  Goal: Optimal Pain Control and Function  Outcome: Progressing  Goal: Skin Health and Integrity  Outcome: Progressing  Goal: Optimal Wound Healing  Outcome: Progressing     Problem: Acute Kidney Injury/Impairment  Goal: Fluid and Electrolyte Balance  Outcome: Progressing  Goal: Improved Oral Intake  Outcome: Progressing  Goal: Effective Renal Function  Outcome: Progressing

## 2024-11-04 NOTE — CONSULTS
[facility]  General Surgery  Consult Note    Patient Name: Jose Berman  MRN: [id number]  Admission Date: 11/3/2024  Attending Physician: Filiberto Hicks MD   Consult Physician: Marcos Anderson MD  Primary Care Provider: Halie Villagran NP    Patient information was obtained from patient and ER records.     Subjective:     Reason for consultation:  Left breast abscess    History of Present Illness:  Jose Berman is a 44 y.o. female with a history of liver transplantation and proximally 9 years ago and a gastric bypass as well as bilateral breast augmentation presents with abscess of the left breast.  Has been I and D in the emergency room.  Surgical consultation was placed to evaluate the area.  For possible need for further intervention    Review of patient's allergies indicates:   Allergen Reactions    Penicillins Hives and Shortness Of Breath       Past Medical History:   Diagnosis Date    Alcoholic liver failure 2014    Underwent transplant     Anxiety 1999    Bipolar 1 disorder, depressed 2004    Bipolar 1 disorder, depressed     Informed from pt via Patient Portal     CMV (cytomegalovirus)     Donor CMV Status: positive    Elevated liver function tests 8/4/2023    ETOH abuse 08/27/2019    admits to social drinking    Heart attack 2015    s/p transplant complication and extended hospitalization     Hepatic steatosis 2014    Hepatitis B     Donor HBcAb: positive- taking lamivudine    Hypertension 2009    Kidney failure 2015    s/p transplant complication and extended hospitalization     Lumbago 1999    Macrocytic anemia 2014    Narcotic overdose 04/2018    hospitalized x 2- See 4/30/2018 office note    Osteoporosis 08/2016    Rheumatoid arthritis 5/1/2017    Skin cancer 1999, 2002, 2016    Stroke 2015    s/p transplant complication and extended hospitalization      Past Surgical History:   Procedure Laterality Date    AUGMENTATION OF BREAST Bilateral 2014     saline    BACK SURGERY  2007    L4-L5 - Laminectomy    BELT ABDOMINOPLASTY  2013    BLADDER SUSPENSION  2004    BREAST SURGERY  2014    Breast Augmentation    CHOLECYSTECTOMY  2009    DILATION AND CURETTAGE OF UTERUS      3 times     ERCP N/A 3/25/2021    Procedure: ERCP (ENDOSCOPIC RETROGRADE CHOLANGIOPANCREATOGRAPHY);  Surgeon: Dewayne Vasquez MD;  Location: [facility] ([address] [place] [address]);  Service: Endoscopy;  Laterality: N/A;  Covid-19 test 3/22/21 at [facility]    GASTRIC BYPASS  2009, 2010    HYSTERECTOMY  2008    LIVER TRANSPLANT  08/26/2015    Complication of liver transplant: dilated ducts suggestive of obstruction    OOPHORECTOMY      SKIN GRAFT  2013, 2012     Family History       Problem Relation (Age of Onset)    Alcohol abuse Father    Arthritis Father, Mother    Colon cancer Father (70)    Depression Father, Mother          Tobacco Use    Smoking status: Every Day     Current packs/day: 0.50     Average packs/day: 0.5 packs/day for 2.0 years (1.0 ttl pk-yrs)     Types: Cigarettes    Smokeless tobacco: Never   Substance and Sexual Activity    Alcohol use: Yes     Comment: Rarely - once monthly    Drug use: Not Currently     Types: Marijuana, Amphetamines    Sexual activity: Yes     Partners: Male     Review of Systems   Skin:         Abscess left breast subareolar complex     Objective:     Vital Signs (Most Recent):  Temp: 98.3 °F (36.8 °C) (11/04/24 1145)  Pulse: 61 (11/04/24 0715)  Resp: 18 (11/04/24 0828)  BP: 133/80 (11/04/24 1145)  SpO2: 99 % (11/04/24 0731) Vital Signs (24h Range):  Temp:  [97.8 °F (36.6 °C)-98.7 °F (37.1 °C)] 98.3 °F (36.8 °C)  Pulse:  [] 61  Resp:  [14-21] 18  SpO2:  [98 %-100 %] 99 %  BP: (133-207)/() 133/80     Weight: 51.1 kg (112 lb 10.5 oz)    Body mass index is 17.64 kg/m².    Review of Systems   Skin:         Abscess left breast subareolar complex       Physical Exam  Vitals and nursing note reviewed. Exam conducted with a chaperone present.   Constitutional:        Appearance: Normal appearance. She is normal weight.   HENT:      Head: Normocephalic and atraumatic.      Mouth/Throat:      Mouth: Mucous membranes are moist.   Eyes:      Extraocular Movements: Extraocular movements intact.      Conjunctiva/sclera: Conjunctivae normal.      Pupils: Pupils are equal, round, and reactive to light.   Cardiovascular:      Rate and Rhythm: Normal rate.   Pulmonary:      Effort: Pulmonary effort is normal.   Abdominal:      General: Abdomen is flat.      Palpations: Abdomen is soft.   Musculoskeletal:         General: Normal range of motion.      Cervical back: Normal range of motion and neck supple.   Skin:     General: Skin is warm.             Comments: Area of abscess left breast   Neurological:      General: No focal deficit present.      Mental Status: She is alert and oriented to person, place, and time. Mental status is at baseline.   Psychiatric:         Mood and Affect: Mood normal.         Significant Labs:  CBC:   Recent Labs   Lab 11/04/24 0336   WBC 9.77   RBC 2.75*   HGB 9.2*   HCT 26.3*   *   MCV 96   MCH 33.5*   MCHC 35.0     BMP:   Recent Labs   Lab 11/04/24 0336         K 3.6      CO2 18*   BUN 25*   CREATININE 2.1*   CALCIUM 8.5*   MG 1.5*     CMP:   Recent Labs   Lab 11/03/24 2030 11/04/24 0336   GLU 61* 106   CALCIUM 8.6* 8.5*   ALBUMIN 3.5  --    PROT 7.6  --     136   K 2.7* 3.6   CO2 21* 18*    107   BUN 29* 25*   CREATININE 2.5* 2.1*   ALKPHOS 239*  --    ALT 18  --    AST 29  --    BILITOT 0.7  --      LFTs:   Recent Labs   Lab 11/03/24 2030   ALT 18   AST 29   ALKPHOS 239*   BILITOT 0.7   PROT 7.6   ALBUMIN 3.5     Coagulation:   Recent Labs   Lab 11/03/24 2030   LABPROT 11.0   INR 1.0   APTT 31.5     Specimen (24h ago, onward)      None          Recent Labs   Lab 11/04/24  0058   COLORU Yellow   SPECGRAV 1.020   PHUR 6.0   PROTEINUA 2+*   BACTERIA Many*   NITRITE Positive*   LEUKOCYTESUR Negative   UROBILINOGEN  1.0   HYALINECASTS 0       Significant Diagnostics:      Assessment:   Jose Berman is a 44 y.o. female who presents with left breast abscess was been partially I&D.  Needs further opening for drainage in the operating room.  Also to evaluate whether the breast augmentation implant is involved.  If it was obviously exposed and within the abscess pocket of the implant we will need to be removed and the patient is on immunosuppressant meds for liver transplantation immunosuppressant.  With thoroughly discussed this with the patient prior to going back to the operating room.  Continue with IV antibiotics..    Active Diagnoses:    Diagnosis Date Noted POA    PRINCIPAL PROBLEM:  Breast abscess of female [N61.1] 11/03/2024 Yes    Hepatitis B [B19.10] 11/03/2024 Yes    Cigarette nicotine dependence without complication [F17.210] 11/03/2024 Yes    Rheumatoid arthritis [M06.9] 05/01/2017 Yes    S/P liver transplant [Z94.4] 08/26/2015 Not Applicable    SHIVAM (acute kidney injury) [N17.9] 08/26/2015 Yes      Problems Resolved During this Admission:     VTE Risk Mitigation (From admission, onward)           Ordered     Reason for No Pharmacological VTE Prophylaxis  Once        Question:  Reasons:  Answer:  Risk of Bleeding    11/03/24 2332     IP VTE HIGH RISK PATIENT  Once         11/03/24 2332     Place sequential compression device  Until discontinued         11/03/24 2332                    Medical Decision Making/Plan:  Nahun Anderson MD  General Surgery  [place] [facility]

## 2024-11-04 NOTE — CARE UPDATE
Seen and examined.  No acute events since admission.  Reports that her appetite has been very poor.  She is presently NPO for impending I/D with General surgery.  Case was discussed with Dr. Anderson.  Will continue IV antibiotics, and IV fluid hydration for SHIVAM. Monitor cultures.

## 2024-11-04 NOTE — H&P (VIEW-ONLY)
[facility]  General Surgery  Consult Note    Patient Name: Jose Berman  MRN: [id number]  Admission Date: 11/3/2024  Attending Physician: Filiberto Hicks MD   Consult Physician: Marcos Anderson MD  Primary Care Provider: Halie Villagran NP    Patient information was obtained from patient and ER records.     Subjective:     Reason for consultation:  Left breast abscess    History of Present Illness:  Jose Berman is a 44 y.o. female with a history of liver transplantation and proximally 9 years ago and a gastric bypass as well as bilateral breast augmentation presents with abscess of the left breast.  Has been I and D in the emergency room.  Surgical consultation was placed to evaluate the area.  For possible need for further intervention    Review of patient's allergies indicates:   Allergen Reactions    Penicillins Hives and Shortness Of Breath       Past Medical History:   Diagnosis Date    Alcoholic liver failure 2014    Underwent transplant     Anxiety 1999    Bipolar 1 disorder, depressed 2004    Bipolar 1 disorder, depressed     Informed from pt via Patient Portal     CMV (cytomegalovirus)     Donor CMV Status: positive    Elevated liver function tests 8/4/2023    ETOH abuse 08/27/2019    admits to social drinking    Heart attack 2015    s/p transplant complication and extended hospitalization     Hepatic steatosis 2014    Hepatitis B     Donor HBcAb: positive- taking lamivudine    Hypertension 2009    Kidney failure 2015    s/p transplant complication and extended hospitalization     Lumbago 1999    Macrocytic anemia 2014    Narcotic overdose 04/2018    hospitalized x 2- See 4/30/2018 office note    Osteoporosis 08/2016    Rheumatoid arthritis 5/1/2017    Skin cancer 1999, 2002, 2016    Stroke 2015    s/p transplant complication and extended hospitalization      Past Surgical History:   Procedure Laterality Date    AUGMENTATION OF BREAST Bilateral 2014     saline    BACK SURGERY  2007    L4-L5 - Laminectomy    BELT ABDOMINOPLASTY  2013    BLADDER SUSPENSION  2004    BREAST SURGERY  2014    Breast Augmentation    CHOLECYSTECTOMY  2009    DILATION AND CURETTAGE OF UTERUS      3 times     ERCP N/A 3/25/2021    Procedure: ERCP (ENDOSCOPIC RETROGRADE CHOLANGIOPANCREATOGRAPHY);  Surgeon: Dewayne Vasquez MD;  Location: [facility] ([address] [place] [address]);  Service: Endoscopy;  Laterality: N/A;  Covid-19 test 3/22/21 at [facility]    GASTRIC BYPASS  2009, 2010    HYSTERECTOMY  2008    LIVER TRANSPLANT  08/26/2015    Complication of liver transplant: dilated ducts suggestive of obstruction    OOPHORECTOMY      SKIN GRAFT  2013, 2012     Family History       Problem Relation (Age of Onset)    Alcohol abuse Father    Arthritis Father, Mother    Colon cancer Father (70)    Depression Father, Mother          Tobacco Use    Smoking status: Every Day     Current packs/day: 0.50     Average packs/day: 0.5 packs/day for 2.0 years (1.0 ttl pk-yrs)     Types: Cigarettes    Smokeless tobacco: Never   Substance and Sexual Activity    Alcohol use: Yes     Comment: Rarely - once monthly    Drug use: Not Currently     Types: Marijuana, Amphetamines    Sexual activity: Yes     Partners: Male     Review of Systems   Skin:         Abscess left breast subareolar complex     Objective:     Vital Signs (Most Recent):  Temp: 98.3 °F (36.8 °C) (11/04/24 1145)  Pulse: 61 (11/04/24 0715)  Resp: 18 (11/04/24 0828)  BP: 133/80 (11/04/24 1145)  SpO2: 99 % (11/04/24 0731) Vital Signs (24h Range):  Temp:  [97.8 °F (36.6 °C)-98.7 °F (37.1 °C)] 98.3 °F (36.8 °C)  Pulse:  [] 61  Resp:  [14-21] 18  SpO2:  [98 %-100 %] 99 %  BP: (133-207)/() 133/80     Weight: 51.1 kg (112 lb 10.5 oz)    Body mass index is 17.64 kg/m².    Review of Systems   Skin:         Abscess left breast subareolar complex       Physical Exam  Vitals and nursing note reviewed. Exam conducted with a chaperone present.   Constitutional:        Appearance: Normal appearance. She is normal weight.   HENT:      Head: Normocephalic and atraumatic.      Mouth/Throat:      Mouth: Mucous membranes are moist.   Eyes:      Extraocular Movements: Extraocular movements intact.      Conjunctiva/sclera: Conjunctivae normal.      Pupils: Pupils are equal, round, and reactive to light.   Cardiovascular:      Rate and Rhythm: Normal rate.   Pulmonary:      Effort: Pulmonary effort is normal.   Abdominal:      General: Abdomen is flat.      Palpations: Abdomen is soft.   Musculoskeletal:         General: Normal range of motion.      Cervical back: Normal range of motion and neck supple.   Skin:     General: Skin is warm.             Comments: Area of abscess left breast   Neurological:      General: No focal deficit present.      Mental Status: She is alert and oriented to person, place, and time. Mental status is at baseline.   Psychiatric:         Mood and Affect: Mood normal.         Significant Labs:  CBC:   Recent Labs   Lab 11/04/24 0336   WBC 9.77   RBC 2.75*   HGB 9.2*   HCT 26.3*   *   MCV 96   MCH 33.5*   MCHC 35.0     BMP:   Recent Labs   Lab 11/04/24 0336         K 3.6      CO2 18*   BUN 25*   CREATININE 2.1*   CALCIUM 8.5*   MG 1.5*     CMP:   Recent Labs   Lab 11/03/24 2030 11/04/24 0336   GLU 61* 106   CALCIUM 8.6* 8.5*   ALBUMIN 3.5  --    PROT 7.6  --     136   K 2.7* 3.6   CO2 21* 18*    107   BUN 29* 25*   CREATININE 2.5* 2.1*   ALKPHOS 239*  --    ALT 18  --    AST 29  --    BILITOT 0.7  --      LFTs:   Recent Labs   Lab 11/03/24 2030   ALT 18   AST 29   ALKPHOS 239*   BILITOT 0.7   PROT 7.6   ALBUMIN 3.5     Coagulation:   Recent Labs   Lab 11/03/24 2030   LABPROT 11.0   INR 1.0   APTT 31.5     Specimen (24h ago, onward)      None          Recent Labs   Lab 11/04/24  0058   COLORU Yellow   SPECGRAV 1.020   PHUR 6.0   PROTEINUA 2+*   BACTERIA Many*   NITRITE Positive*   LEUKOCYTESUR Negative   UROBILINOGEN  1.0   HYALINECASTS 0       Significant Diagnostics:      Assessment:   Jose Berman is a 44 y.o. female who presents with left breast abscess was been partially I&D.  Needs further opening for drainage in the operating room.  Also to evaluate whether the breast augmentation implant is involved.  If it was obviously exposed and within the abscess pocket of the implant we will need to be removed and the patient is on immunosuppressant meds for liver transplantation immunosuppressant.  With thoroughly discussed this with the patient prior to going back to the operating room.  Continue with IV antibiotics..    Active Diagnoses:    Diagnosis Date Noted POA    PRINCIPAL PROBLEM:  Breast abscess of female [N61.1] 11/03/2024 Yes    Hepatitis B [B19.10] 11/03/2024 Yes    Cigarette nicotine dependence without complication [F17.210] 11/03/2024 Yes    Rheumatoid arthritis [M06.9] 05/01/2017 Yes    S/P liver transplant [Z94.4] 08/26/2015 Not Applicable    SHIVAM (acute kidney injury) [N17.9] 08/26/2015 Yes      Problems Resolved During this Admission:     VTE Risk Mitigation (From admission, onward)           Ordered     Reason for No Pharmacological VTE Prophylaxis  Once        Question:  Reasons:  Answer:  Risk of Bleeding    11/03/24 2332     IP VTE HIGH RISK PATIENT  Once         11/03/24 2332     Place sequential compression device  Until discontinued         11/03/24 2332                    Medical Decision Making/Plan:  Nahun Anderson MD  General Surgery  [place] [facility]

## 2024-11-04 NOTE — ANESTHESIA PREPROCEDURE EVALUATION
11/04/2024  Jose Berman is a 44 y.o., female.      Pre-op Assessment    I have reviewed the Patient Summary Reports.    I have reviewed the NPO Status.   I have reviewed the Medications.     Review of Systems  Anesthesia Hx:    Complicated post op course post liver transplant in 2015. Claims to have had a perioperative MI and Stroke.  No documentation noted. Multiple transfusions. ERCP in 2021 without incident              Social:  Smoker       Hematology/Oncology:       -- Anemia:                                  Cardiovascular:     Hypertension                                          Pulmonary:   COPD, mild   Shortness of breath                  Renal/:  Chronic Renal Disease   SHIVAM with elevated Cr.             Hepatic/GI:      Liver Disease, Hepatitis Post Transplant> LFT's are in acceptable range.             Neurological:           Highly anxious                            Psych:   anxiety                 Physical Exam  General: Cachexia    Airway:  Mallampati: I   Mouth Opening: Normal  TM Distance: Normal  Tongue: Normal  Neck ROM: Normal ROM    Dental:  Edentulous    Chest/Lungs:  Clear to auscultation    Heart:  Rate: Normal        Anesthesia Plan  Type of Anesthesia, risks & benefits discussed:    Anesthesia Type: Gen ETT  Intra-op Monitoring Plan: Standard ASA Monitors  Post Op Pain Control Plan: IV/PO Opioids PRN  Induction:  IV  Airway Plan: Video  Informed Consent: Informed consent signed with the Patient and all parties understand the risks and agree with anesthesia plan.  All questions answered. Patient consented to blood products? Yes  ASA Score: 3    Ready For Surgery From Anesthesia Perspective.     .

## 2024-11-04 NOTE — ED NOTES
Patient resting comfortably at this time. States pain improved since pressure removed from breast after I&D complete. Bandage intact. Pressure trending downward. Denies any needs at this time.

## 2024-11-05 LAB
ANION GAP SERPL CALC-SCNC: 11 MMOL/L (ref 8–16)
BASOPHILS # BLD AUTO: 0.02 K/UL (ref 0–0.2)
BASOPHILS NFR BLD: 0.3 % (ref 0–1.9)
BUN SERPL-MCNC: 22 MG/DL (ref 6–20)
CALCIUM SERPL-MCNC: 8.5 MG/DL (ref 8.7–10.5)
CHLORIDE SERPL-SCNC: 113 MMOL/L (ref 95–110)
CO2 SERPL-SCNC: 16 MMOL/L (ref 23–29)
CREAT SERPL-MCNC: 2 MG/DL (ref 0.5–1.4)
DIFFERENTIAL METHOD BLD: ABNORMAL
EOSINOPHIL # BLD AUTO: 0.1 K/UL (ref 0–0.5)
EOSINOPHIL NFR BLD: 1.6 % (ref 0–8)
ERYTHROCYTE [DISTWIDTH] IN BLOOD BY AUTOMATED COUNT: 14.6 % (ref 11.5–14.5)
EST. GFR  (NO RACE VARIABLE): 30.6 ML/MIN/1.73 M^2
GLUCOSE SERPL-MCNC: 96 MG/DL (ref 70–110)
HCT VFR BLD AUTO: 28.2 % (ref 37–48.5)
HGB BLD-MCNC: 9.4 G/DL (ref 12–16)
IMM GRANULOCYTES # BLD AUTO: 0.02 K/UL (ref 0–0.04)
IMM GRANULOCYTES NFR BLD AUTO: 0.3 % (ref 0–0.5)
LYMPHOCYTES # BLD AUTO: 0.9 K/UL (ref 1–4.8)
LYMPHOCYTES NFR BLD: 14.3 % (ref 18–48)
MAGNESIUM SERPL-MCNC: 1.7 MG/DL (ref 1.6–2.6)
MCH RBC QN AUTO: 33.3 PG (ref 27–31)
MCHC RBC AUTO-ENTMCNC: 33.3 G/DL (ref 32–36)
MCV RBC AUTO: 100 FL (ref 82–98)
MONOCYTES # BLD AUTO: 0.5 K/UL (ref 0.3–1)
MONOCYTES NFR BLD: 7.7 % (ref 4–15)
NEUTROPHILS # BLD AUTO: 4.6 K/UL (ref 1.8–7.7)
NEUTROPHILS NFR BLD: 75.8 % (ref 38–73)
NRBC BLD-RTO: 0 /100 WBC
PLATELET # BLD AUTO: 128 K/UL (ref 150–450)
PMV BLD AUTO: 9.7 FL (ref 9.2–12.9)
POTASSIUM SERPL-SCNC: 3.7 MMOL/L (ref 3.5–5.1)
RBC # BLD AUTO: 2.82 M/UL (ref 4–5.4)
SODIUM SERPL-SCNC: 140 MMOL/L (ref 136–145)
VANCOMYCIN SERPL-MCNC: 10.8 UG/ML
WBC # BLD AUTO: 6.09 K/UL (ref 3.9–12.7)

## 2024-11-05 PROCEDURE — 99900035 HC TECH TIME PER 15 MIN (STAT)

## 2024-11-05 PROCEDURE — 25000003 PHARM REV CODE 250: Performed by: NURSE PRACTITIONER

## 2024-11-05 PROCEDURE — 80202 ASSAY OF VANCOMYCIN: CPT | Performed by: SPECIALIST

## 2024-11-05 PROCEDURE — 63600175 PHARM REV CODE 636 W HCPCS: Mod: UD | Performed by: SPECIALIST

## 2024-11-05 PROCEDURE — 85025 COMPLETE CBC W/AUTO DIFF WBC: CPT | Performed by: SPECIALIST

## 2024-11-05 PROCEDURE — 36415 COLL VENOUS BLD VENIPUNCTURE: CPT | Performed by: SPECIALIST

## 2024-11-05 PROCEDURE — 83735 ASSAY OF MAGNESIUM: CPT | Performed by: SPECIALIST

## 2024-11-05 PROCEDURE — 25000003 PHARM REV CODE 250: Performed by: SPECIALIST

## 2024-11-05 PROCEDURE — 21400001 HC TELEMETRY ROOM

## 2024-11-05 PROCEDURE — 63600175 PHARM REV CODE 636 W HCPCS: Performed by: STUDENT IN AN ORGANIZED HEALTH CARE EDUCATION/TRAINING PROGRAM

## 2024-11-05 PROCEDURE — 80048 BASIC METABOLIC PNL TOTAL CA: CPT | Performed by: SPECIALIST

## 2024-11-05 PROCEDURE — 63600175 PHARM REV CODE 636 W HCPCS: Performed by: SPECIALIST

## 2024-11-05 PROCEDURE — 25000003 PHARM REV CODE 250: Mod: UD | Performed by: SPECIALIST

## 2024-11-05 PROCEDURE — 94761 N-INVAS EAR/PLS OXIMETRY MLT: CPT

## 2024-11-05 PROCEDURE — 63600175 PHARM REV CODE 636 W HCPCS: Performed by: NURSE PRACTITIONER

## 2024-11-05 PROCEDURE — S4991 NICOTINE PATCH NONLEGEND: HCPCS | Performed by: SPECIALIST

## 2024-11-05 PROCEDURE — 11000001 HC ACUTE MED/SURG PRIVATE ROOM

## 2024-11-05 RX ORDER — OXYCODONE AND ACETAMINOPHEN 5; 325 MG/1; MG/1
1 TABLET ORAL EVERY 4 HOURS PRN
Status: DISCONTINUED | OUTPATIENT
Start: 2024-11-05 | End: 2024-11-08 | Stop reason: HOSPADM

## 2024-11-05 RX ORDER — AMOXICILLIN 250 MG
1 CAPSULE ORAL 2 TIMES DAILY
Status: DISCONTINUED | OUTPATIENT
Start: 2024-11-05 | End: 2024-11-07

## 2024-11-05 RX ORDER — HYDROMORPHONE HYDROCHLORIDE 1 MG/ML
1 INJECTION, SOLUTION INTRAMUSCULAR; INTRAVENOUS; SUBCUTANEOUS EVERY 4 HOURS PRN
Status: DISCONTINUED | OUTPATIENT
Start: 2024-11-05 | End: 2024-11-08

## 2024-11-05 RX ORDER — OXYCODONE AND ACETAMINOPHEN 10; 325 MG/1; MG/1
1 TABLET ORAL EVERY 4 HOURS PRN
Status: DISCONTINUED | OUTPATIENT
Start: 2024-11-05 | End: 2024-11-08 | Stop reason: HOSPADM

## 2024-11-05 RX ORDER — LAMIVUDINE 150 MG/1
150 TABLET, FILM COATED ORAL DAILY
Status: DISCONTINUED | OUTPATIENT
Start: 2024-11-05 | End: 2024-11-08 | Stop reason: HOSPADM

## 2024-11-05 RX ADMIN — CEFTRIAXONE SODIUM 2 G: 2 INJECTION, POWDER, FOR SOLUTION INTRAMUSCULAR; INTRAVENOUS at 10:11

## 2024-11-05 RX ADMIN — LAMIVUDINE 150 MG: 150 TABLET, FILM COATED ORAL at 11:11

## 2024-11-05 RX ADMIN — HYDROMORPHONE HYDROCHLORIDE 1 MG: 1 INJECTION, SOLUTION INTRAMUSCULAR; INTRAVENOUS; SUBCUTANEOUS at 11:11

## 2024-11-05 RX ADMIN — HYDRALAZINE HYDROCHLORIDE 25 MG: 25 TABLET ORAL at 03:11

## 2024-11-05 RX ADMIN — THERA TABS 1 TABLET: TAB at 09:11

## 2024-11-05 RX ADMIN — SENNOSIDES AND DOCUSATE SODIUM 1 TABLET: 50; 8.6 TABLET ORAL at 11:11

## 2024-11-05 RX ADMIN — HYDROCODONE BITARTRATE AND ACETAMINOPHEN 1 TABLET: 10; 325 TABLET ORAL at 05:11

## 2024-11-05 RX ADMIN — HYDROMORPHONE HYDROCHLORIDE 1 MG: 1 INJECTION, SOLUTION INTRAMUSCULAR; INTRAVENOUS; SUBCUTANEOUS at 07:11

## 2024-11-05 RX ADMIN — NICOTINE 1 PATCH: 21 PATCH, EXTENDED RELEASE TRANSDERMAL at 09:11

## 2024-11-05 RX ADMIN — LISINOPRIL 10 MG: 10 TABLET ORAL at 09:11

## 2024-11-05 RX ADMIN — TACROLIMUS 6 MG: 1 CAPSULE ORAL at 06:11

## 2024-11-05 RX ADMIN — OXYCODONE HYDROCHLORIDE AND ACETAMINOPHEN 1 TABLET: 10; 325 TABLET ORAL at 09:11

## 2024-11-05 RX ADMIN — HYDRALAZINE HYDROCHLORIDE 25 MG: 25 TABLET ORAL at 05:11

## 2024-11-05 RX ADMIN — HYDRALAZINE HYDROCHLORIDE 25 MG: 25 TABLET ORAL at 09:11

## 2024-11-05 RX ADMIN — MORPHINE SULFATE 2 MG: 2 INJECTION, SOLUTION INTRAMUSCULAR; INTRAVENOUS at 02:11

## 2024-11-05 RX ADMIN — MORPHINE SULFATE 2 MG: 2 INJECTION, SOLUTION INTRAMUSCULAR; INTRAVENOUS at 07:11

## 2024-11-05 RX ADMIN — OXYCODONE HYDROCHLORIDE AND ACETAMINOPHEN 1 TABLET: 10; 325 TABLET ORAL at 11:11

## 2024-11-05 RX ADMIN — TACROLIMUS 6 MG: 1 CAPSULE ORAL at 11:11

## 2024-11-05 RX ADMIN — PANTOPRAZOLE SODIUM 40 MG: 40 TABLET, DELAYED RELEASE ORAL at 09:11

## 2024-11-05 RX ADMIN — SODIUM CHLORIDE: 9 INJECTION, SOLUTION INTRAVENOUS at 09:11

## 2024-11-05 RX ADMIN — SENNOSIDES AND DOCUSATE SODIUM 1 TABLET: 50; 8.6 TABLET ORAL at 08:11

## 2024-11-05 RX ADMIN — TRAZODONE HYDROCHLORIDE 25 MG: 50 TABLET ORAL at 11:11

## 2024-11-05 RX ADMIN — OXYCODONE HYDROCHLORIDE AND ACETAMINOPHEN 1 TABLET: 10; 325 TABLET ORAL at 05:11

## 2024-11-05 NOTE — PLAN OF CARE
Problem: Gas Exchange Impaired  Goal: Optimal Gas Exchange  Outcome: Progressing  Intervention: Optimize Oxygenation and Ventilation  Flowsheets (Taken 11/5/2024 9421)  Airway/Ventilation Management: airway patency maintained  Head of Bed (HOB) Positioning: HOB elevated   Patient in no apparent distress. Sat's 100  % on room air. PRN treatments not needed. IS done Per patient . Will continue to monitor.

## 2024-11-05 NOTE — ASSESSMENT & PLAN NOTE
Began on Oct 22 after injury to left breast with progressive worsening   I&D performed in ED with cultures pending  IV vancomycin and IV ceftriaxone started.  Continue  Consult general surgery  S/P I&D  Pain control  Wound Care

## 2024-11-05 NOTE — PROGRESS NOTES
[facility] Medicine  Progress Note    Patient Name: Jose Berman  MRN: [id number]  Patient Class: IP- Inpatient   Admission Date: 11/3/2024  Length of Stay: 2 days  Attending Physician: Filiberto Hicks MD  Primary Care Provider: Halie Villagran NP        Subjective:     Principal Problem:Breast abscess of female        HPI:  Jose Berman is a 45 y/o F with past medical history significant for liver transplant, hep B, bipolar disorder, RA and HTN who presented to the ED with c/o left breast pain and swelling which began on October 22 when she injured her left breast while carrying a box.  She was seen by her transplant team the following day and was advised to go to the ED for concerns over possible abscess formation; however, she did not follow up with anyone at that time.  Over the last 2 weeks she has had progressive swelling and erythema to the left breast and now reports some drainage from the site.  Denies fever, chills, N/V or any other symptoms.  While in the ED, an I&D was performed at bedside with cultures collected.  She was started on IV rocephin and IV vancomycin.  Labs are significant for SHIVAM and hypokalemia.  She is admitted to the service of hospital medicine for further medical management.    Overview/Hospital Course:  No notes on file    Interval History:  Patient seen and examined.  Reports her pain has been uncontrolled since she has been out of surgery.  She reports going outside to smoke a cigarette.  I have had a discussion with her that this is against hospital policy.  She does have a nicotine patch ordered.  I have changed up her pain control regimen in attempts to achieve better control over her pain.  Serum creatinine remains elevated this morning, with slight improvement from yesterday.    Review of Systems   Skin:  Positive for color change and wound.     Objective:     Vital Signs (Most Recent):  Temp: 97.6 °F (36.4 °C)  (11/05/24 0720)  Pulse: 70 (11/05/24 0851)  Resp: 18 (11/05/24 0948)  BP: (!) 144/99 (11/05/24 0948)  SpO2: 100 % (11/05/24 0851) Vital Signs (24h Range):  Temp:  [97.4 °F (36.3 °C)-98.3 °F (36.8 °C)] 97.6 °F (36.4 °C)  Pulse:  [66-82] 70  Resp:  [13-20] 18  SpO2:  [99 %-100 %] 100 %  BP: (114-188)/() 144/99     Weight: 50 kg (110 lb 3.7 oz)  Body mass index is 17.26 kg/m².    Intake/Output Summary (Last 24 hours) at 11/5/2024 1021  Last data filed at 11/4/2024 2316  Gross per 24 hour   Intake 222 ml   Output 702 ml   Net -480 ml         Physical Exam  Constitutional:       General: She is not in acute distress.     Appearance: She is not ill-appearing, toxic-appearing or diaphoretic.   HENT:      Head: Normocephalic and atraumatic.   Cardiovascular:      Rate and Rhythm: Normal rate and regular rhythm.      Pulses: Normal pulses.      Heart sounds: Normal heart sounds.   Pulmonary:      Effort: Pulmonary effort is normal. No respiratory distress.      Breath sounds: Normal breath sounds.   Musculoskeletal:         General: Normal range of motion.   Skin:     General: Skin is warm and dry.   Neurological:      Mental Status: She is alert and oriented to person, place, and time. Mental status is at baseline.                       Significant Labs: All pertinent labs within the past 24 hours have been reviewed.  CBC:   Recent Labs   Lab 11/03/24 2030 11/04/24 0336 11/05/24  0243   WBC 9.53 9.77 6.09   HGB 10.0* 9.2* 9.4*   HCT 29.3* 26.3* 28.2*   * 109* 128*     CMP:   Recent Labs   Lab 11/03/24 2030 11/04/24  0336 11/05/24  0243    136 140   K 2.7* 3.6 3.7    107 113*   CO2 21* 18* 16*   GLU 61* 106 96   BUN 29* 25* 22*   CREATININE 2.5* 2.1* 2.0*   CALCIUM 8.6* 8.5* 8.5*   PROT 7.6  --   --    ALBUMIN 3.5  --   --    BILITOT 0.7  --   --    ALKPHOS 239*  --   --    AST 29  --   --    ALT 18  --   --    ANIONGAP 14 11 11       Significant Imaging: I have reviewed all pertinent imaging  results/findings within the past 24 hours.    Assessment/Plan:      * Breast abscess of female  Began on Oct 22 after injury to left breast with progressive worsening   I&D performed in ED with cultures pending  IV vancomycin and IV ceftriaxone started.  Continue  Consult general surgery  S/P I&D  Pain control  Wound Care    Cigarette nicotine dependence without complication  Dangers of cigarette smoking were reviewed with patient in detail. Patient was Counseled for 3-10 minutes. Nicotine replacement options were discussed. Nicotine replacement was discussed- prescribed    Hepatitis B  Chronic  Continue lamivudine      Rheumatoid arthritis  Noted  No longer on Humira    S/P liver transplant  S/p liver transplant 2015  Reports compliance with meds-continue  Monitor LFTs      SHIVAM (acute kidney injury)  SHIVAM is likely due to  infection . Baseline creatinine is  1.4 . Most recent creatinine and eGFR are listed below.  Recent Labs     11/03/24 2030   CREATININE 2.5*   EGFRNORACEVR 23.4*      Plan  - SHIVAM is worsening. Will continue current treatment  - Avoid nephrotoxins and renally dose meds for GFR listed above  - Monitor urine output, serial BMP, and adjust therapy as needed  - [facility]-trend      VTE Risk Mitigation (From admission, onward)           Ordered     Reason for No Pharmacological VTE Prophylaxis  Once        Question:  Reasons:  Answer:  Risk of Bleeding    11/03/24 2332     IP VTE HIGH RISK PATIENT  Once         11/03/24 2332     Place sequential compression device  Until discontinued         11/03/24 2332                    Discharge Planning   MARA: 11/8/2024     Code Status: Full Code   Is the patient medically ready for discharge?:     Reason for patient still in hospital (select all that apply): Patient trending condition, Laboratory test, and Treatment  Discharge Plan A: Shelter   Discharge Delays: None known at this time              Carmella Jerry NP  Department of Hospital Medicine   [facility]  Comprehensive Care Unit

## 2024-11-05 NOTE — PLAN OF CARE
sent paperwork to [employer] for patient to re appeal her housing. Patient was provided with Abuse Hotline number for shelter. Patient stated she is not feeling well and said she does nto know what she will do. Pt receives SSI and has money saved.SW called resource Center to find out of there are emergency funds we can get or home for patients needs.   11/05/24 6001   Discharge Plan   Discharge Plan A Shelter   Discharge Plan B Shelter      sent over paperwork to [facility] and filled out a from for care for this patient for emergency housing.

## 2024-11-05 NOTE — SUBJECTIVE & OBJECTIVE
Interval History:  Patient seen and examined.  Reports her pain has been uncontrolled since she has been out of surgery.  She reports going outside to smoke a cigarette.  I have had a discussion with her that this is against hospital policy.  She does have a nicotine patch ordered.  I have changed up her pain control regimen in attempts to achieve better control over her pain.  Serum creatinine remains elevated this morning, with slight improvement from yesterday.    Review of Systems   Skin:  Positive for color change and wound.     Objective:     Vital Signs (Most Recent):  Temp: 97.6 °F (36.4 °C) (11/05/24 0720)  Pulse: 70 (11/05/24 0851)  Resp: 18 (11/05/24 0948)  BP: (!) 144/99 (11/05/24 0948)  SpO2: 100 % (11/05/24 0851) Vital Signs (24h Range):  Temp:  [97.4 °F (36.3 °C)-98.3 °F (36.8 °C)] 97.6 °F (36.4 °C)  Pulse:  [66-82] 70  Resp:  [13-20] 18  SpO2:  [99 %-100 %] 100 %  BP: (114-188)/() 144/99     Weight: 50 kg (110 lb 3.7 oz)  Body mass index is 17.26 kg/m².    Intake/Output Summary (Last 24 hours) at 11/5/2024 1021  Last data filed at 11/4/2024 2316  Gross per 24 hour   Intake 222 ml   Output 702 ml   Net -480 ml         Physical Exam  Constitutional:       General: She is not in acute distress.     Appearance: She is not ill-appearing, toxic-appearing or diaphoretic.   HENT:      Head: Normocephalic and atraumatic.   Cardiovascular:      Rate and Rhythm: Normal rate and regular rhythm.      Pulses: Normal pulses.      Heart sounds: Normal heart sounds.   Pulmonary:      Effort: Pulmonary effort is normal. No respiratory distress.      Breath sounds: Normal breath sounds.   Musculoskeletal:         General: Normal range of motion.   Skin:     General: Skin is warm and dry.   Neurological:      Mental Status: She is alert and oriented to person, place, and time. Mental status is at baseline.                       Significant Labs: All pertinent labs within the past 24 hours have been reviewed.  CBC:    Recent Labs   Lab 11/03/24 2030 11/04/24 0336 11/05/24  0243   WBC 9.53 9.77 6.09   HGB 10.0* 9.2* 9.4*   HCT 29.3* 26.3* 28.2*   * 109* 128*     CMP:   Recent Labs   Lab 11/03/24 2030 11/04/24 0336 11/05/24  0243    136 140   K 2.7* 3.6 3.7    107 113*   CO2 21* 18* 16*   GLU 61* 106 96   BUN 29* 25* 22*   CREATININE 2.5* 2.1* 2.0*   CALCIUM 8.6* 8.5* 8.5*   PROT 7.6  --   --    ALBUMIN 3.5  --   --    BILITOT 0.7  --   --    ALKPHOS 239*  --   --    AST 29  --   --    ALT 18  --   --    ANIONGAP 14 11 11       Significant Imaging: I have reviewed all pertinent imaging results/findings within the past 24 hours.

## 2024-11-06 LAB
ANION GAP SERPL CALC-SCNC: 10 MMOL/L (ref 8–16)
BACTERIA #/AREA URNS HPF: NORMAL /HPF
BACTERIA UR CULT: ABNORMAL
BASOPHILS # BLD AUTO: 0.02 K/UL (ref 0–0.2)
BASOPHILS NFR BLD: 0.4 % (ref 0–1.9)
BILIRUB UR QL STRIP: NEGATIVE
BUN SERPL-MCNC: 21 MG/DL (ref 6–20)
CALCIUM SERPL-MCNC: 8.1 MG/DL (ref 8.7–10.5)
CHLORIDE SERPL-SCNC: 114 MMOL/L (ref 95–110)
CK SERPL-CCNC: 45 U/L (ref 20–180)
CLARITY UR: CLEAR
CO2 SERPL-SCNC: 18 MMOL/L (ref 23–29)
COLOR UR: YELLOW
CREAT SERPL-MCNC: 2 MG/DL (ref 0.5–1.4)
DIFFERENTIAL METHOD BLD: ABNORMAL
EOSINOPHIL # BLD AUTO: 0.1 K/UL (ref 0–0.5)
EOSINOPHIL NFR BLD: 1.4 % (ref 0–8)
ERYTHROCYTE [DISTWIDTH] IN BLOOD BY AUTOMATED COUNT: 14.4 % (ref 11.5–14.5)
EST. GFR  (NO RACE VARIABLE): 30.6 ML/MIN/1.73 M^2
GLUCOSE SERPL-MCNC: 79 MG/DL (ref 70–110)
GLUCOSE UR QL STRIP: NEGATIVE
HCT VFR BLD AUTO: 28.7 % (ref 37–48.5)
HGB BLD-MCNC: 9.6 G/DL (ref 12–16)
HGB UR QL STRIP: NEGATIVE
HYALINE CASTS #/AREA URNS LPF: 1 /LPF
IMM GRANULOCYTES # BLD AUTO: 0.01 K/UL (ref 0–0.04)
IMM GRANULOCYTES NFR BLD AUTO: 0.2 % (ref 0–0.5)
KETONES UR QL STRIP: NEGATIVE
LEUKOCYTE ESTERASE UR QL STRIP: NEGATIVE
LYMPHOCYTES # BLD AUTO: 1.2 K/UL (ref 1–4.8)
LYMPHOCYTES NFR BLD: 23.7 % (ref 18–48)
MAGNESIUM SERPL-MCNC: 1.5 MG/DL (ref 1.6–2.6)
MCH RBC QN AUTO: 33.4 PG (ref 27–31)
MCHC RBC AUTO-ENTMCNC: 33.4 G/DL (ref 32–36)
MCV RBC AUTO: 100 FL (ref 82–98)
MICROSCOPIC COMMENT: NORMAL
MONOCYTES # BLD AUTO: 0.4 K/UL (ref 0.3–1)
MONOCYTES NFR BLD: 7.6 % (ref 4–15)
NEUTROPHILS # BLD AUTO: 3.3 K/UL (ref 1.8–7.7)
NEUTROPHILS NFR BLD: 66.7 % (ref 38–73)
NITRITE UR QL STRIP: NEGATIVE
NRBC BLD-RTO: 0 /100 WBC
OSMOLALITY UR: 429 MOSM/KG (ref 50–1200)
PH UR STRIP: 6 [PH] (ref 5–8)
PHOSPHATE SERPL-MCNC: 3.8 MG/DL (ref 2.7–4.5)
PLATELET # BLD AUTO: 153 K/UL (ref 150–450)
PMV BLD AUTO: 10.4 FL (ref 9.2–12.9)
POCT GLUCOSE: 88 MG/DL (ref 70–110)
POTASSIUM SERPL-SCNC: 4 MMOL/L (ref 3.5–5.1)
PROT UR QL STRIP: ABNORMAL
RBC # BLD AUTO: 2.87 M/UL (ref 4–5.4)
RBC #/AREA URNS HPF: 2 /HPF (ref 0–4)
SODIUM SERPL-SCNC: 142 MMOL/L (ref 136–145)
SODIUM UR-SCNC: 110 MMOL/L (ref 20–250)
SP GR UR STRIP: 1.02 (ref 1–1.03)
SQUAMOUS #/AREA URNS HPF: 2 /HPF
URATE SERPL-MCNC: 6.2 MG/DL (ref 2.4–5.7)
URN SPEC COLLECT METH UR: ABNORMAL
UROBILINOGEN UR STRIP-ACNC: 1 EU/DL
WBC # BLD AUTO: 4.89 K/UL (ref 3.9–12.7)
WBC #/AREA URNS HPF: 1 /HPF (ref 0–5)

## 2024-11-06 PROCEDURE — 63600175 PHARM REV CODE 636 W HCPCS: Mod: UD | Performed by: NURSE PRACTITIONER

## 2024-11-06 PROCEDURE — 63600175 PHARM REV CODE 636 W HCPCS: Performed by: SPECIALIST

## 2024-11-06 PROCEDURE — 80048 BASIC METABOLIC PNL TOTAL CA: CPT | Performed by: SPECIALIST

## 2024-11-06 PROCEDURE — 84550 ASSAY OF BLOOD/URIC ACID: CPT | Performed by: NURSE PRACTITIONER

## 2024-11-06 PROCEDURE — 84100 ASSAY OF PHOSPHORUS: CPT | Performed by: SPECIALIST

## 2024-11-06 PROCEDURE — 85025 COMPLETE CBC W/AUTO DIFF WBC: CPT | Performed by: SPECIALIST

## 2024-11-06 PROCEDURE — 99900035 HC TECH TIME PER 15 MIN (STAT)

## 2024-11-06 PROCEDURE — 83735 ASSAY OF MAGNESIUM: CPT | Performed by: SPECIALIST

## 2024-11-06 PROCEDURE — 25000003 PHARM REV CODE 250: Performed by: NURSE PRACTITIONER

## 2024-11-06 PROCEDURE — 63600175 PHARM REV CODE 636 W HCPCS: Mod: UD | Performed by: SPECIALIST

## 2024-11-06 PROCEDURE — 11000001 HC ACUTE MED/SURG PRIVATE ROOM

## 2024-11-06 PROCEDURE — 83935 ASSAY OF URINE OSMOLALITY: CPT | Performed by: NURSE PRACTITIONER

## 2024-11-06 PROCEDURE — 21400001 HC TELEMETRY ROOM

## 2024-11-06 PROCEDURE — 94761 N-INVAS EAR/PLS OXIMETRY MLT: CPT

## 2024-11-06 PROCEDURE — 81000 URINALYSIS NONAUTO W/SCOPE: CPT | Performed by: NURSE PRACTITIONER

## 2024-11-06 PROCEDURE — 25000003 PHARM REV CODE 250: Mod: UD | Performed by: SPECIALIST

## 2024-11-06 PROCEDURE — 25000003 PHARM REV CODE 250: Performed by: SPECIALIST

## 2024-11-06 PROCEDURE — S4991 NICOTINE PATCH NONLEGEND: HCPCS | Performed by: SPECIALIST

## 2024-11-06 PROCEDURE — 36415 COLL VENOUS BLD VENIPUNCTURE: CPT | Performed by: NURSE PRACTITIONER

## 2024-11-06 PROCEDURE — 84300 ASSAY OF URINE SODIUM: CPT | Performed by: NURSE PRACTITIONER

## 2024-11-06 PROCEDURE — 82550 ASSAY OF CK (CPK): CPT | Performed by: NURSE PRACTITIONER

## 2024-11-06 RX ORDER — MAGNESIUM SULFATE HEPTAHYDRATE 40 MG/ML
2 INJECTION, SOLUTION INTRAVENOUS ONCE
Status: COMPLETED | OUTPATIENT
Start: 2024-11-06 | End: 2024-11-06

## 2024-11-06 RX ORDER — HYDROXYZINE HYDROCHLORIDE 25 MG/1
50 TABLET, FILM COATED ORAL 3 TIMES DAILY PRN
Status: DISCONTINUED | OUTPATIENT
Start: 2024-11-06 | End: 2024-11-08 | Stop reason: HOSPADM

## 2024-11-06 RX ORDER — SODIUM CHLORIDE 450 MG/100ML
INJECTION, SOLUTION INTRAVENOUS CONTINUOUS
Status: DISCONTINUED | OUTPATIENT
Start: 2024-11-06 | End: 2024-11-08

## 2024-11-06 RX ORDER — HYDRALAZINE HYDROCHLORIDE 20 MG/ML
10 INJECTION INTRAMUSCULAR; INTRAVENOUS EVERY 8 HOURS PRN
Status: DISCONTINUED | OUTPATIENT
Start: 2024-11-06 | End: 2024-11-08 | Stop reason: HOSPADM

## 2024-11-06 RX ADMIN — SENNOSIDES AND DOCUSATE SODIUM 1 TABLET: 50; 8.6 TABLET ORAL at 09:11

## 2024-11-06 RX ADMIN — HYDROMORPHONE HYDROCHLORIDE 1 MG: 1 INJECTION, SOLUTION INTRAMUSCULAR; INTRAVENOUS; SUBCUTANEOUS at 12:11

## 2024-11-06 RX ADMIN — MAGNESIUM SULFATE HEPTAHYDRATE 2 G: 40 INJECTION, SOLUTION INTRAVENOUS at 08:11

## 2024-11-06 RX ADMIN — HYDROXYZINE HYDROCHLORIDE 50 MG: 25 TABLET, FILM COATED ORAL at 10:11

## 2024-11-06 RX ADMIN — SODIUM CHLORIDE: 4.5 INJECTION, SOLUTION INTRAVENOUS at 08:11

## 2024-11-06 RX ADMIN — HYDRALAZINE HYDROCHLORIDE 25 MG: 25 TABLET ORAL at 05:11

## 2024-11-06 RX ADMIN — CLONIDINE HYDROCHLORIDE 0.1 MG: 0.1 TABLET ORAL at 12:11

## 2024-11-06 RX ADMIN — OXYCODONE HYDROCHLORIDE AND ACETAMINOPHEN 1 TABLET: 10; 325 TABLET ORAL at 10:11

## 2024-11-06 RX ADMIN — PANTOPRAZOLE SODIUM 40 MG: 40 TABLET, DELAYED RELEASE ORAL at 08:11

## 2024-11-06 RX ADMIN — HYDROMORPHONE HYDROCHLORIDE 1 MG: 1 INJECTION, SOLUTION INTRAMUSCULAR; INTRAVENOUS; SUBCUTANEOUS at 04:11

## 2024-11-06 RX ADMIN — TACROLIMUS 6 MG: 1 CAPSULE ORAL at 08:11

## 2024-11-06 RX ADMIN — SODIUM CHLORIDE: 9 INJECTION, SOLUTION INTRAVENOUS at 02:11

## 2024-11-06 RX ADMIN — THERA TABS 1 TABLET: TAB at 08:11

## 2024-11-06 RX ADMIN — OXYCODONE HYDROCHLORIDE AND ACETAMINOPHEN 1 TABLET: 10; 325 TABLET ORAL at 11:11

## 2024-11-06 RX ADMIN — HYDRALAZINE HYDROCHLORIDE 10 MG: 20 INJECTION, SOLUTION INTRAMUSCULAR; INTRAVENOUS at 03:11

## 2024-11-06 RX ADMIN — HYDROMORPHONE HYDROCHLORIDE 1 MG: 1 INJECTION, SOLUTION INTRAMUSCULAR; INTRAVENOUS; SUBCUTANEOUS at 01:11

## 2024-11-06 RX ADMIN — NICOTINE 1 PATCH: 21 PATCH, EXTENDED RELEASE TRANSDERMAL at 08:11

## 2024-11-06 RX ADMIN — HYDRALAZINE HYDROCHLORIDE 25 MG: 25 TABLET ORAL at 02:11

## 2024-11-06 RX ADMIN — SODIUM CHLORIDE: 4.5 INJECTION, SOLUTION INTRAVENOUS at 05:11

## 2024-11-06 RX ADMIN — CEFTRIAXONE SODIUM 2 G: 2 INJECTION, POWDER, FOR SOLUTION INTRAMUSCULAR; INTRAVENOUS at 10:11

## 2024-11-06 RX ADMIN — SENNOSIDES AND DOCUSATE SODIUM 1 TABLET: 50; 8.6 TABLET ORAL at 08:11

## 2024-11-06 RX ADMIN — HYDROXYZINE HYDROCHLORIDE 50 MG: 25 TABLET, FILM COATED ORAL at 02:11

## 2024-11-06 RX ADMIN — HYDRALAZINE HYDROCHLORIDE 25 MG: 25 TABLET ORAL at 10:11

## 2024-11-06 RX ADMIN — TACROLIMUS 6 MG: 1 CAPSULE ORAL at 06:11

## 2024-11-06 RX ADMIN — HYDROCODONE BITARTRATE AND ACETAMINOPHEN 1 TABLET: 10; 325 TABLET ORAL at 04:11

## 2024-11-06 RX ADMIN — HYDROMORPHONE HYDROCHLORIDE 1 MG: 1 INJECTION, SOLUTION INTRAMUSCULAR; INTRAVENOUS; SUBCUTANEOUS at 08:11

## 2024-11-06 RX ADMIN — LAMIVUDINE 150 MG: 150 TABLET, FILM COATED ORAL at 08:11

## 2024-11-06 RX ADMIN — HYDROMORPHONE HYDROCHLORIDE 1 MG: 1 INJECTION, SOLUTION INTRAMUSCULAR; INTRAVENOUS; SUBCUTANEOUS at 07:11

## 2024-11-06 RX ADMIN — OXYCODONE HYDROCHLORIDE AND ACETAMINOPHEN 1 TABLET: 10; 325 TABLET ORAL at 06:11

## 2024-11-06 NOTE — PLAN OF CARE
Problem: Gas Exchange Impaired  Goal: Optimal Gas Exchange  Outcome: Progressing  Intervention: Optimize Oxygenation and Ventilation  Flowsheets (Taken 11/6/2024 0810)  Airway/Ventilation Management: airway patency maintained  Head of Bed (HOB) Positioning: HOB elevated   Patient in no apparent distress. Sat's 100  % on room air. PRN treatments not needed. IS done per patient . Will continue to monitor.

## 2024-11-06 NOTE — ASSESSMENT & PLAN NOTE
SHIVAM is likely due to  infection . Baseline creatinine is  1.4 . Most recent creatinine and eGFR are listed below.  Recent Labs     11/04/24  0336 11/05/24  0243 11/06/24  0440   CREATININE 2.1* 2.0* 2.0*   EGFRNORACEVR 28.9* 30.6* 30.6*        Plan  - SHIVAM is stable  - Avoid nephrotoxins and renally dose meds for GFR listed above  - Monitor urine output, serial BMP, and adjust therapy as needed  - IVFH - continue to match diuresis  - check urine Osmo, urine sodium  - check uric acid, CK   - renal ultrasound

## 2024-11-06 NOTE — PROGRESS NOTES
[facility] Medicine  Progress Note    Patient Name: Jose Berman  MRN: [id number]  Patient Class: IP- Inpatient   Admission Date: 11/3/2024  Length of Stay: 3 days  Attending Physician: Filiberto Hicks MD  Primary Care Provider: Halie Villagran NP        Subjective:     Principal Problem:Breast abscess of female        HPI:  Jose Berman is a 43 y/o F with past medical history significant for liver transplant, hep B, bipolar disorder, RA and HTN who presented to the ED with c/o left breast pain and swelling which began on October 22 when she injured her left breast while carrying a box.  She was seen by her transplant team the following day and was advised to go to the ED for concerns over possible abscess formation; however, she did not follow up with anyone at that time.  Over the last 2 weeks she has had progressive swelling and erythema to the left breast and now reports some drainage from the site.  Denies fever, chills, N/V or any other symptoms.  While in the ED, an I&D was performed at bedside with cultures collected.  She was started on IV rocephin and IV vancomycin.  Labs are significant for SHIVAM and hypokalemia.  She is admitted to the service of hospital medicine for further medical management.    Overview/Hospital Course:  No notes on file    Interval History:  Seen and examined.  No acute events overnight.  Reports that the change in pain medications has helped some, but she is still in significant pain to her left breast.  Serum creatinine is stagnant and she has put out quite a bit of urine pointing towards acute tubular necrosis.  Will stop nephrotoxic medications for now.  Workup for ATN ensuing.    Review of Systems   Musculoskeletal:  Positive for myalgias.   Skin:  Positive for color change and wound.   Neurological:  Positive for headaches.   All other systems reviewed and are negative.    Objective:     Vital Signs (Most  Recent):  Temp: 98 °F (36.7 °C) (11/06/24 0726)  Pulse: 70 (11/06/24 0809)  Resp: 16 (11/06/24 1059)  BP: (!) 137/97 (nurse notfied) (11/06/24 0537)  SpO2: 100 % (11/06/24 0809) Vital Signs (24h Range):  Temp:  [97.5 °F (36.4 °C)-98.1 °F (36.7 °C)] 98 °F (36.7 °C)  Pulse:  [62-78] 70  Resp:  [16-18] 16  SpO2:  [97 %-100 %] 100 %  BP: (130-166)/(87-99) 137/97     Weight: 50 kg (110 lb 3.7 oz)  Body mass index is 17.26 kg/m².    Intake/Output Summary (Last 24 hours) at 11/6/2024 1109  Last data filed at 11/6/2024 0436  Gross per 24 hour   Intake 220 ml   Output 1800 ml   Net -1580 ml         Physical Exam  Constitutional:       General: She is not in acute distress.     Appearance: She is not ill-appearing, toxic-appearing or diaphoretic.   HENT:      Head: Normocephalic and atraumatic.   Cardiovascular:      Rate and Rhythm: Normal rate and regular rhythm.      Pulses: Normal pulses.      Heart sounds: Normal heart sounds.   Pulmonary:      Effort: Pulmonary effort is normal. No respiratory distress.      Breath sounds: Normal breath sounds.   Musculoskeletal:         General: Normal range of motion.   Skin:     General: Skin is warm and dry.      Findings: Erythema present.      Comments: Left breast dressing C/D/I   Neurological:      Mental Status: She is alert and oriented to person, place, and time. Mental status is at baseline.             Significant Labs: All pertinent labs within the past 24 hours have been reviewed.  CBC:   Recent Labs   Lab 11/05/24 0243 11/06/24  0440   WBC 6.09 4.89   HGB 9.4* 9.6*   HCT 28.2* 28.7*   * 153     CMP:   Recent Labs   Lab 11/05/24 0243 11/06/24  0440    142   K 3.7 4.0   * 114*   CO2 16* 18*   GLU 96 79   BUN 22* 21*   CREATININE 2.0* 2.0*   CALCIUM 8.5* 8.1*   ANIONGAP 11 10     Magnesium:   Recent Labs   Lab 11/05/24  0243 11/06/24  0440   MG 1.7 1.5*     Urine Studies:   Recent Labs   Lab 11/06/24  0843   COLORU Yellow   APPEARANCEUA Clear   PHUR 6.0    SPECGRAV 1.025   PROTEINUA 2+*   GLUCUA Negative   KETONESU Negative   BILIRUBINUA Negative   OCCULTUA Negative   NITRITE Negative   UROBILINOGEN 1.0   LEUKOCYTESUR Negative   RBCUA 2   WBCUA 1   BACTERIA Rare   SQUAMEPITHEL 2   HYALINECASTS 1       Significant Imaging: I have reviewed all pertinent imaging results/findings within the past 24 hours.    Assessment/Plan:      * Breast abscess of female  Began on Oct 22 after injury to left breast with progressive worsening   I&D performed in ED with cultures pending  IV vancomycin and IV ceftriaxone started.  Continue  Consult general surgery  S/P I&D  Pain control  Wound Care    Cigarette nicotine dependence without complication  Dangers of cigarette smoking were reviewed with patient in detail. Patient was Counseled for 3-10 minutes. Nicotine replacement options were discussed. Nicotine replacement was discussed- prescribed    Hepatitis B  Chronic  Continue lamivudine      Rheumatoid arthritis  Noted  No longer on Humira    S/P liver transplant  S/p liver transplant 2015  Reports compliance with meds-continue  Monitor LFTs      ATN (acute tubular necrosis)  SHIVAM is likely due to  infection . Baseline creatinine is  1.4 . Most recent creatinine and eGFR are listed below.  Recent Labs     11/04/24  0336 11/05/24  0243 11/06/24  0440   CREATININE 2.1* 2.0* 2.0*   EGFRNORACEVR 28.9* 30.6* 30.6*        Plan  - SHIVAM is stable  - Avoid nephrotoxins and renally dose meds for GFR listed above  - Monitor urine output, serial BMP, and adjust therapy as needed  - IVFH - continue to match diuresis  - check urine Osmo, urine sodium  - check uric acid, CK   - renal ultrasound         VTE Risk Mitigation (From admission, onward)           Ordered     Reason for No Pharmacological VTE Prophylaxis  Once        Question:  Reasons:  Answer:  Risk of Bleeding    11/03/24 2332     IP VTE HIGH RISK PATIENT  Once         11/03/24 2332     Place sequential compression device  Until  discontinued         11/03/24 2332                    Discharge Planning   MARA: 11/8/2024     Code Status: Full Code   Is the patient medically ready for discharge?:     Reason for patient still in hospital (select all that apply): Patient trending condition, Laboratory test, and Treatment  Discharge Plan A: Shelter   Discharge Delays: None known at this time              Carmella Jerry NP  Department of [facility] Medicine   [facility]

## 2024-11-06 NOTE — SUBJECTIVE & OBJECTIVE
Interval History:  Seen and examined.  No acute events overnight.  Reports that the change in pain medications has helped some, but she is still in significant pain to her left breast.  Serum creatinine is stagnant and she has put out quite a bit of urine pointing towards acute tubular necrosis.  Will stop nephrotoxic medications for now.  Workup for ATN ensuing.    Review of Systems   Musculoskeletal:  Positive for myalgias.   Skin:  Positive for color change and wound.   Neurological:  Positive for headaches.   All other systems reviewed and are negative.    Objective:     Vital Signs (Most Recent):  Temp: 98 °F (36.7 °C) (11/06/24 0726)  Pulse: 70 (11/06/24 0809)  Resp: 16 (11/06/24 1059)  BP: (!) 137/97 (nurse notfied) (11/06/24 0537)  SpO2: 100 % (11/06/24 0809) Vital Signs (24h Range):  Temp:  [97.5 °F (36.4 °C)-98.1 °F (36.7 °C)] 98 °F (36.7 °C)  Pulse:  [62-78] 70  Resp:  [16-18] 16  SpO2:  [97 %-100 %] 100 %  BP: (130-166)/(87-99) 137/97     Weight: 50 kg (110 lb 3.7 oz)  Body mass index is 17.26 kg/m².    Intake/Output Summary (Last 24 hours) at 11/6/2024 1109  Last data filed at 11/6/2024 0436  Gross per 24 hour   Intake 220 ml   Output 1800 ml   Net -1580 ml         Physical Exam  Constitutional:       General: She is not in acute distress.     Appearance: She is not ill-appearing, toxic-appearing or diaphoretic.   HENT:      Head: Normocephalic and atraumatic.   Cardiovascular:      Rate and Rhythm: Normal rate and regular rhythm.      Pulses: Normal pulses.      Heart sounds: Normal heart sounds.   Pulmonary:      Effort: Pulmonary effort is normal. No respiratory distress.      Breath sounds: Normal breath sounds.   Musculoskeletal:         General: Normal range of motion.   Skin:     General: Skin is warm and dry.      Findings: Erythema present.      Comments: Left breast dressing C/D/I   Neurological:      Mental Status: She is alert and oriented to person, place, and time. Mental status is at  baseline.             Significant Labs: All pertinent labs within the past 24 hours have been reviewed.  CBC:   Recent Labs   Lab 11/05/24  0243 11/06/24  0440   WBC 6.09 4.89   HGB 9.4* 9.6*   HCT 28.2* 28.7*   * 153     CMP:   Recent Labs   Lab 11/05/24  0243 11/06/24  0440    142   K 3.7 4.0   * 114*   CO2 16* 18*   GLU 96 79   BUN 22* 21*   CREATININE 2.0* 2.0*   CALCIUM 8.5* 8.1*   ANIONGAP 11 10     Magnesium:   Recent Labs   Lab 11/05/24  0243 11/06/24  0440   MG 1.7 1.5*     Urine Studies:   Recent Labs   Lab 11/06/24  0843   COLORU Yellow   APPEARANCEUA Clear   PHUR 6.0   SPECGRAV 1.025   PROTEINUA 2+*   GLUCUA Negative   KETONESU Negative   BILIRUBINUA Negative   OCCULTUA Negative   NITRITE Negative   UROBILINOGEN 1.0   LEUKOCYTESUR Negative   RBCUA 2   WBCUA 1   BACTERIA Rare   SQUAMEPITHEL 2   HYALINECASTS 1       Significant Imaging: I have reviewed all pertinent imaging results/findings within the past 24 hours.

## 2024-11-06 NOTE — PLAN OF CARE
Problem: Adult Inpatient Plan of Care  Goal: Plan of Care Review  2024 by Elbert No RN  Outcome: Progressing  2024 by Elbert No RN  Outcome: Progressing  Goal: Patient-Specific Goal (Individualized)  2024 by Elbert No RN  Outcome: Progressing  2024 by Elbert No RN  Outcome: Progressing  Goal: Absence of Hospital-Acquired Illness or Injury  2024 by Elbert No RN  Outcome: Progressing  2024 by Elbert No RN  Outcome: Progressing  Goal: Optimal Comfort and Wellbeing  2024 by Elbert No RN  Outcome: Progressing  2024 by Elbert No RN  Outcome: Progressing  Goal: Readiness for Transition of Care  2024 by Elbert No RN  Outcome: Progressing  2024 by Elbert No RN  Outcome: Progressing     Problem: Skin or Soft Tissue Infection  Goal: Absence of Infection Signs and Symptoms  2024 by Elbert No RN  Outcome: Progressing  2024 by Elbert No RN  Outcome: Progressing     Problem: Pain Acute  Goal: Optimal Pain Control and Function  2024 by Elbert No RN  Outcome: Progressing  2024 by Elbert No RN  Outcome: Progressing  2024 by Elbret No RN  Outcome: Progressing     Problem: Hypertension Acute  Goal: Blood Pressure Within Desired Range  2024 by Elbert No RN  Outcome: Progressing  2024 by Elbert No RN  Outcome: Progressing     Problem: Infection  Goal: Absence of Infection Signs and Symptoms  2024 by Elbert No RN  Outcome: Progressing  2024 by Elbert No RN  Outcome: Progressing     Problem:  Fall Injury Risk  Goal: Absence of Fall, Infant Drop and Related Injury  2024 by Elbert No RN  Outcome: Progressing  2024 by Marybel, Linne, RN  Outcome: Progressing     Problem: Wound  Goal: Optimal Coping  2024 0247 by Marybel  SOPHIA [place]  Outcome: Progressing  11/6/2024 0247 by Elbert No RN  Outcome: Progressing  Goal: Optimal Functional Ability  11/6/2024 0247 by Elbert No RN  Outcome: Progressing  11/6/2024 0247 by Elbert No RN  Outcome: Progressing  Goal: Absence of Infection Signs and Symptoms  11/6/2024 0247 by Elbert No RN  Outcome: Progressing  11/6/2024 0247 by Elbert oN RN  Outcome: Progressing  Goal: Improved Oral Intake  11/6/2024 0247 by Elbert No RN  Outcome: Progressing  11/6/2024 0247 by Elbert oN RN  Outcome: Progressing  Goal: Optimal Pain Control and Function  11/6/2024 0247 by Elbert No RN  Outcome: Progressing  11/6/2024 0247 by Elbert No RN  Outcome: Progressing  Goal: Skin Health and Integrity  11/6/2024 0247 by Elbert No RN  Outcome: Progressing  11/6/2024 0247 by Elbert No RN  Outcome: Progressing  Goal: Optimal Wound Healing  11/6/2024 0247 by Elbert No RN  Outcome: Progressing  11/6/2024 0247 by Elbert No RN  Outcome: Progressing     Problem: Acute Kidney Injury/Impairment  Goal: Fluid and Electrolyte Balance  11/6/2024 0247 by Elbert No RN  Outcome: Progressing  11/6/2024 0247 by Elbert No RN  Outcome: Progressing  Goal: Improved Oral Intake  11/6/2024 0247 by Elbert No RN  Outcome: Progressing  11/6/2024 0247 by Elbert No RN  Outcome: Progressing  Goal: Effective Renal Function  11/6/2024 0247 by Elbert No RN  Outcome: Progressing  11/6/2024 0247 by Elbert No RN  Outcome: Progressing     Problem: Gas Exchange Impaired  Goal: Optimal Gas Exchange  11/6/2024 0247 by Elbert No RN  Outcome: Progressing  11/6/2024 0247 by Elbert No RN  Outcome: Progressing     Problem: Skin Injury Risk Increased  Goal: Skin Health and Integrity  11/6/2024 0247 by Elbert No RN  Outcome: Progressing  11/6/2024 0247 by Elbert No RN  Outcome: Progressing   POC reviewed at bedside. Questions and concerns addressed. VSS.  Placed bed in low and locked position. Call light within reach. Side rails up x2. Instructed to call for any needs. Verbalized understanding of all instructions. Frequent rounds.

## 2024-11-06 NOTE — PLAN OF CARE
HUDpaperwork was sent over by [facility] for patient to fill out.  provided [facility] number to call about any apartments she could rent etc.   11/06/24 3616   Post-Acute Status   Other Status Community Services   Discharge Plan   Discharge Plan A Shelter   Discharge Plan B Shelter

## 2024-11-07 ENCOUNTER — TELEPHONE (OUTPATIENT)
Dept: FAMILY MEDICINE | Facility: CLINIC | Age: 44
End: 2024-11-07
Payer: MEDICAID

## 2024-11-07 LAB
ALBUMIN SERPL BCP-MCNC: 2.5 G/DL (ref 3.5–5.2)
ALP SERPL-CCNC: 221 U/L (ref 40–150)
ALT SERPL W/O P-5'-P-CCNC: 33 U/L (ref 10–44)
ANION GAP SERPL CALC-SCNC: 8 MMOL/L (ref 8–16)
AST SERPL-CCNC: 32 U/L (ref 10–40)
BACTERIA SPEC AEROBE CULT: ABNORMAL
BACTERIA SPEC AEROBE CULT: ABNORMAL
BILIRUB SERPL-MCNC: 0.2 MG/DL (ref 0.1–1)
BUN SERPL-MCNC: 19 MG/DL (ref 6–20)
CALCIUM SERPL-MCNC: 8 MG/DL (ref 8.7–10.5)
CHLORIDE SERPL-SCNC: 114 MMOL/L (ref 95–110)
CO2 SERPL-SCNC: 18 MMOL/L (ref 23–29)
CREAT SERPL-MCNC: 2 MG/DL (ref 0.5–1.4)
EST. GFR  (NO RACE VARIABLE): 30.6 ML/MIN/1.73 M^2
GLUCOSE SERPL-MCNC: 54 MG/DL (ref 70–110)
MAGNESIUM SERPL-MCNC: 1.7 MG/DL (ref 1.6–2.6)
PHOSPHATE SERPL-MCNC: 3.8 MG/DL (ref 2.7–4.5)
POTASSIUM SERPL-SCNC: 4.3 MMOL/L (ref 3.5–5.1)
PROT SERPL-MCNC: 5.5 G/DL (ref 6–8.4)
SODIUM SERPL-SCNC: 140 MMOL/L (ref 136–145)

## 2024-11-07 PROCEDURE — 83735 ASSAY OF MAGNESIUM: CPT | Performed by: NURSE PRACTITIONER

## 2024-11-07 PROCEDURE — 11000001 HC ACUTE MED/SURG PRIVATE ROOM

## 2024-11-07 PROCEDURE — 36415 COLL VENOUS BLD VENIPUNCTURE: CPT | Performed by: NURSE PRACTITIONER

## 2024-11-07 PROCEDURE — 99900031 HC PATIENT EDUCATION (STAT)

## 2024-11-07 PROCEDURE — 25000003 PHARM REV CODE 250: Performed by: SPECIALIST

## 2024-11-07 PROCEDURE — 80053 COMPREHEN METABOLIC PANEL: CPT | Performed by: NURSE PRACTITIONER

## 2024-11-07 PROCEDURE — 63600175 PHARM REV CODE 636 W HCPCS: Performed by: SPECIALIST

## 2024-11-07 PROCEDURE — 21400001 HC TELEMETRY ROOM

## 2024-11-07 PROCEDURE — 25000003 PHARM REV CODE 250: Performed by: NURSE PRACTITIONER

## 2024-11-07 PROCEDURE — 63600175 PHARM REV CODE 636 W HCPCS: Performed by: NURSE PRACTITIONER

## 2024-11-07 PROCEDURE — 25000003 PHARM REV CODE 250: Performed by: STUDENT IN AN ORGANIZED HEALTH CARE EDUCATION/TRAINING PROGRAM

## 2024-11-07 PROCEDURE — S4991 NICOTINE PATCH NONLEGEND: HCPCS | Performed by: SPECIALIST

## 2024-11-07 PROCEDURE — 94761 N-INVAS EAR/PLS OXIMETRY MLT: CPT

## 2024-11-07 PROCEDURE — 84100 ASSAY OF PHOSPHORUS: CPT | Performed by: NURSE PRACTITIONER

## 2024-11-07 PROCEDURE — 63600175 PHARM REV CODE 636 W HCPCS: Mod: UD | Performed by: SPECIALIST

## 2024-11-07 PROCEDURE — 99900035 HC TECH TIME PER 15 MIN (STAT)

## 2024-11-07 RX ORDER — LINEZOLID 600 MG/1
600 TABLET, FILM COATED ORAL EVERY 12 HOURS
Status: DISCONTINUED | OUTPATIENT
Start: 2024-11-07 | End: 2024-11-08 | Stop reason: HOSPADM

## 2024-11-07 RX ORDER — MUPIROCIN 20 MG/G
OINTMENT TOPICAL 2 TIMES DAILY
Status: DISCONTINUED | OUTPATIENT
Start: 2024-11-07 | End: 2024-11-08 | Stop reason: HOSPADM

## 2024-11-07 RX ORDER — LACTULOSE 10 G/15ML
15 SOLUTION ORAL 2 TIMES DAILY
Status: DISCONTINUED | OUTPATIENT
Start: 2024-11-07 | End: 2024-11-08 | Stop reason: HOSPADM

## 2024-11-07 RX ORDER — AMOXICILLIN 250 MG
2 CAPSULE ORAL 2 TIMES DAILY
Status: DISCONTINUED | OUTPATIENT
Start: 2024-11-07 | End: 2024-11-08 | Stop reason: HOSPADM

## 2024-11-07 RX ORDER — POLYETHYLENE GLYCOL 3350 17 G/17G
17 POWDER, FOR SOLUTION ORAL DAILY
Status: DISCONTINUED | OUTPATIENT
Start: 2024-11-07 | End: 2024-11-08 | Stop reason: HOSPADM

## 2024-11-07 RX ADMIN — LACTULOSE 15 G: 20 SOLUTION ORAL at 08:11

## 2024-11-07 RX ADMIN — SODIUM CHLORIDE: 4.5 INJECTION, SOLUTION INTRAVENOUS at 10:11

## 2024-11-07 RX ADMIN — LINEZOLID 600 MG: 600 TABLET, FILM COATED ORAL at 11:11

## 2024-11-07 RX ADMIN — HYDRALAZINE HYDROCHLORIDE 25 MG: 25 TABLET ORAL at 09:11

## 2024-11-07 RX ADMIN — SODIUM CHLORIDE: 4.5 INJECTION, SOLUTION INTRAVENOUS at 01:11

## 2024-11-07 RX ADMIN — LAMIVUDINE 150 MG: 150 TABLET, FILM COATED ORAL at 08:11

## 2024-11-07 RX ADMIN — HYDRALAZINE HYDROCHLORIDE 25 MG: 25 TABLET ORAL at 01:11

## 2024-11-07 RX ADMIN — HYDRALAZINE HYDROCHLORIDE 25 MG: 25 TABLET ORAL at 06:11

## 2024-11-07 RX ADMIN — CLONIDINE HYDROCHLORIDE 0.1 MG: 0.1 TABLET ORAL at 09:11

## 2024-11-07 RX ADMIN — OXYCODONE HYDROCHLORIDE AND ACETAMINOPHEN 1 TABLET: 10; 325 TABLET ORAL at 11:11

## 2024-11-07 RX ADMIN — SIMETHICONE 80 MG: 80 TABLET, CHEWABLE ORAL at 09:11

## 2024-11-07 RX ADMIN — HYDROMORPHONE HYDROCHLORIDE 1 MG: 1 INJECTION, SOLUTION INTRAMUSCULAR; INTRAVENOUS; SUBCUTANEOUS at 08:11

## 2024-11-07 RX ADMIN — PANTOPRAZOLE SODIUM 40 MG: 40 TABLET, DELAYED RELEASE ORAL at 08:11

## 2024-11-07 RX ADMIN — HYDROMORPHONE HYDROCHLORIDE 1 MG: 1 INJECTION, SOLUTION INTRAMUSCULAR; INTRAVENOUS; SUBCUTANEOUS at 01:11

## 2024-11-07 RX ADMIN — TACROLIMUS 6 MG: 1 CAPSULE ORAL at 05:11

## 2024-11-07 RX ADMIN — SENNOSIDES AND DOCUSATE SODIUM 2 TABLET: 50; 8.6 TABLET ORAL at 08:11

## 2024-11-07 RX ADMIN — CEFTRIAXONE SODIUM 2 G: 2 INJECTION, POWDER, FOR SOLUTION INTRAMUSCULAR; INTRAVENOUS at 10:11

## 2024-11-07 RX ADMIN — POLYETHYLENE GLYCOL (3350) 17 G: 17 POWDER, FOR SOLUTION ORAL at 08:11

## 2024-11-07 RX ADMIN — OXYCODONE HYDROCHLORIDE AND ACETAMINOPHEN 1 TABLET: 10; 325 TABLET ORAL at 08:11

## 2024-11-07 RX ADMIN — OXYCODONE HYDROCHLORIDE AND ACETAMINOPHEN 1 TABLET: 10; 325 TABLET ORAL at 04:11

## 2024-11-07 RX ADMIN — NICOTINE 1 PATCH: 21 PATCH, EXTENDED RELEASE TRANSDERMAL at 08:11

## 2024-11-07 RX ADMIN — HYDROMORPHONE HYDROCHLORIDE 1 MG: 1 INJECTION, SOLUTION INTRAMUSCULAR; INTRAVENOUS; SUBCUTANEOUS at 09:11

## 2024-11-07 RX ADMIN — THERA TABS 1 TABLET: TAB at 08:11

## 2024-11-07 RX ADMIN — HYDROMORPHONE HYDROCHLORIDE 1 MG: 1 INJECTION, SOLUTION INTRAMUSCULAR; INTRAVENOUS; SUBCUTANEOUS at 05:11

## 2024-11-07 RX ADMIN — HYDRALAZINE HYDROCHLORIDE 10 MG: 20 INJECTION, SOLUTION INTRAMUSCULAR; INTRAVENOUS at 08:11

## 2024-11-07 RX ADMIN — LINEZOLID 600 MG: 600 TABLET, FILM COATED ORAL at 08:11

## 2024-11-07 RX ADMIN — TACROLIMUS 6 MG: 1 CAPSULE ORAL at 07:11

## 2024-11-07 RX ADMIN — MUPIROCIN: 20 OINTMENT TOPICAL at 08:11

## 2024-11-07 NOTE — PLAN OF CARE
Problem: Adult Inpatient Plan of Care  Goal: Plan of Care Review  Outcome: Progressing  Goal: Patient-Specific Goal (Individualized)  Outcome: Progressing  Goal: Absence of Hospital-Acquired Illness or Injury  Outcome: Progressing  Goal: Optimal Comfort and Wellbeing  Outcome: Progressing  Goal: Readiness for Transition of Care  Outcome: Progressing     Problem: Skin or Soft Tissue Infection  Goal: Absence of Infection Signs and Symptoms  Outcome: Progressing     Problem: Pain Acute  Goal: Optimal Pain Control and Function  Outcome: Progressing     Problem: Hypertension Acute  Goal: Blood Pressure Within Desired Range  Outcome: Progressing     Problem: Infection  Goal: Absence of Infection Signs and Symptoms  Outcome: Progressing     Problem:  Fall Injury Risk  Goal: Absence of Fall, Infant Drop and Related Injury  Outcome: Progressing     Problem: Wound  Goal: Optimal Coping  Outcome: Progressing  Goal: Optimal Functional Ability  Outcome: Progressing  Goal: Absence of Infection Signs and Symptoms  Outcome: Progressing  Goal: Improved Oral Intake  Outcome: Progressing  Goal: Optimal Pain Control and Function  Outcome: Progressing  Goal: Skin Health and Integrity  Outcome: Progressing  Goal: Optimal Wound Healing  Outcome: Progressing     Problem: Acute Kidney Injury/Impairment  Goal: Fluid and Electrolyte Balance  Outcome: Progressing  Goal: Improved Oral Intake  Outcome: Progressing  Goal: Effective Renal Function  Outcome: Progressing     Problem: Gas Exchange Impaired  Goal: Optimal Gas Exchange  Outcome: Progressing     Problem: Skin Injury Risk Increased  Goal: Skin Health and Integrity  Outcome: Progressing    Pt Aox4 throughout shift. VSS. Free from fall, skin breakdown already noted and pictures in chart. Bed in lowest position, locked, alarm set, side rails raised x2, and call light placed within reach. Rounding done hourly. All questions answered, no complaints at this time. Plan of care  ongoing.

## 2024-11-07 NOTE — TREATMENT PLAN
Application for [facility] completed with patient over the phone & submitted via their website. Patient did receive an email that she has an open application with them. Spoke to them & they don't have emergency housing & won't be able to give her a voucher for a hotel since living in [place]. She provided me with phone number to [facility] [phone]. Will provide patient with the number for her to call as spoke to the office & intake will have to come from the patient calling herself.

## 2024-11-07 NOTE — ASSESSMENT & PLAN NOTE
Continue Rocephin  UC growing EColi - await sensitivities     [FreeTextEntry1] : D/C Amoxil\par Supportive Care\par RTO if worse or changes in appearance

## 2024-11-07 NOTE — ASSESSMENT & PLAN NOTE
Began on Oct 22 after injury to left breast with progressive worsening   I&D performed in ED with cultures pending  Consult general surgery  S/P I&D  Pain control  Wound Care  Cultures growing staph - pending final  Change Vanc to Linezolid for now  Continue Rocephin   [Fever] : fever [Chills] : chills [Fatigue] : fatigue [Night Sweats] : night sweats [Recent Change In Weight] : ~T no recent weight change [Negative] : Heme/Lymph

## 2024-11-07 NOTE — PROGRESS NOTES
[facility] Medicine  Progress Note    Patient Name: Jose Berman  MRN: [id number]  Patient Class: IP- Inpatient   Admission Date: 11/3/2024  Length of Stay: 4 days  Attending Physician: Filiberto Hicks MD  Primary Care Provider: Halie Villagran NP        Subjective:     Principal Problem:Breast abscess of female        HPI:  Jose Berman is a 45 y/o F with past medical history significant for liver transplant, hep B, bipolar disorder, RA and HTN who presented to the ED with c/o left breast pain and swelling which began on October 22 when she injured her left breast while carrying a box.  She was seen by her transplant team the following day and was advised to go to the ED for concerns over possible abscess formation; however, she did not follow up with anyone at that time.  Over the last 2 weeks she has had progressive swelling and erythema to the left breast and now reports some drainage from the site.  Denies fever, chills, N/V or any other symptoms.  While in the ED, an I&D was performed at bedside with cultures collected.  She was started on IV rocephin and IV vancomycin.  Labs are significant for SHIVAM and hypokalemia.  She is admitted to the service of hospital medicine for further medical management.    Overview/Hospital Course:  No notes on file    Interval History:  Seen and examined.  No acute events overnight.  In chair during my examination.  Feels a little better today.  Serum creatinine is still stagnant.    Review of Systems   Musculoskeletal:  Positive for myalgias.   Skin:  Positive for color change and wound.   Neurological:  Positive for headaches.   All other systems reviewed and are negative.    Objective:     Vital Signs (Most Recent):  Temp: 98.1 °F (36.7 °C) (11/07/24 0430)  Pulse: 84 (11/07/24 0855)  Resp: (!) 33 (11/07/24 0855)  BP: (!) 157/94 (11/07/24 0430)  SpO2: 98 % (11/07/24 0430) Vital Signs (24h Range):  Temp:  [97.4 °F  (36.3 °C)-98.1 °F (36.7 °C)] 98.1 °F (36.7 °C)  Pulse:  [63-89] 84  Resp:  [14-33] 33  SpO2:  [98 %-100 %] 98 %  BP: (127-172)/() 157/94     Weight: 50 kg (110 lb 3.7 oz)  Body mass index is 17.26 kg/m².    Intake/Output Summary (Last 24 hours) at 11/7/2024 1051  Last data filed at 11/7/2024 0802  Gross per 24 hour   Intake 2469.85 ml   Output 800 ml   Net 1669.85 ml         Physical Exam  Constitutional:       General: She is not in acute distress.     Appearance: She is not ill-appearing, toxic-appearing or diaphoretic.   HENT:      Head: Normocephalic and atraumatic.   Cardiovascular:      Rate and Rhythm: Normal rate and regular rhythm.      Pulses: Normal pulses.      Heart sounds: Normal heart sounds.   Pulmonary:      Effort: Pulmonary effort is normal. No respiratory distress.      Breath sounds: Normal breath sounds.   Musculoskeletal:         General: Normal range of motion.   Skin:     General: Skin is warm and dry.      Findings: Erythema present.      Comments: Left breast dressing C/D/I   Neurological:      Mental Status: She is alert and oriented to person, place, and time. Mental status is at baseline.             Significant Labs: All pertinent labs within the past 24 hours have been reviewed.  CBC:   Recent Labs   Lab 11/06/24  0440   WBC 4.89   HGB 9.6*   HCT 28.7*        CMP:   Recent Labs   Lab 11/06/24  0440 11/07/24  0654    140   K 4.0 4.3   * 114*   CO2 18* 18*   GLU 79 54*   BUN 21* 19   CREATININE 2.0* 2.0*   CALCIUM 8.1* 8.0*   PROT  --  5.5*   ALBUMIN  --  2.5*   BILITOT  --  0.2   ALKPHOS  --  221*   AST  --  32   ALT  --  33   ANIONGAP 10 8     Magnesium:   Recent Labs   Lab 11/06/24  0440 11/07/24  0654   MG 1.5* 1.7     Urine Studies:   Recent Labs   Lab 11/06/24  0843   COLORU Yellow   APPEARANCEUA Clear   PHUR 6.0   SPECGRAV 1.025   PROTEINUA 2+*   GLUCUA Negative   KETONESU Negative   BILIRUBINUA Negative   OCCULTUA Negative   NITRITE Negative    UROBILINOGEN 1.0   LEUKOCYTESUR Negative   RBCUA 2   WBCUA 1   BACTERIA Rare   SQUAMEPITHEL 2   HYALINECASTS 1       Significant Imaging: I have reviewed all pertinent imaging results/findings within the past 24 hours.    EXAMINATION:  US RETROPERITONEAL COMPLETE    CLINICAL HISTORY:  tre;    TECHNIQUE:  Ultrasound of the kidneys and urinary bladder was performed including color flow and Doppler evaluation of the kidneys.    COMPARISON:  None.    FINDINGS:  Right kidney: The right kidney mild increased echogenicity and measures 11.4 cm. No cortical thinning. No loss of corticomedullary distinction. Resistive index measures 0.72.  No mass. No renal stone. No hydronephrosis.    Left kidney: The left kidney mild increased echogenicity and measures 10.4 cm. No cortical thinning. No loss of corticomedullary distinction. Resistive index measures 0.71.  No mass. No renal stone. No hydronephrosis.    Splenic artery resistive index 0.71.    The bladder is partially distended at the time of scanning and has an unremarkable appearance.  Small amount of free fluid within the pelvis.   Impression:       Mild increased echogenicity within the bilateral kidneys may reflect acute kidney injury and/or chronic medical renal disease.    Physiologic free fluid within the pelvis.      Electronically signed by: Dann Emanuel  Date: 11/06/2024  Time: 12:09       Assessment/Plan:      * Breast abscess of female  Began on Oct 22 after injury to left breast with progressive worsening   I&D performed in ED with cultures pending  Consult general surgery  S/P I&D  Pain control  Wound Care  Cultures growing staph - pending final  Change Vanc to Linezolid for now  Continue Rocephin    Cigarette nicotine dependence without complication  Dangers of cigarette smoking were reviewed with patient in detail. Patient was Counseled for 3-10 minutes. Nicotine replacement options were discussed. Nicotine replacement was discussed- prescribed    Hepatitis  B  Chronic  Continue lamivudine      Rheumatoid arthritis  Noted  No longer on Humira    UTI (urinary tract infection)  Continue Rocephin  UC growing EColi - await sensitivities      S/P liver transplant  S/p liver transplant 2015  Reports compliance with meds-continue  Monitor LFTs      ATN (acute tubular necrosis)  SHIVAM is likely due to  infection . Baseline creatinine is  1.4 . Most recent creatinine and eGFR are listed below.  Recent Labs     11/05/24  0243 11/06/24  0440 11/07/24  0654   CREATININE 2.0* 2.0* 2.0*   EGFRNORACEVR 30.6* 30.6* 30.6*        Plan  - SHIVAM is stable  - Avoid nephrotoxins and renally dose meds for GFR listed above  - Monitor urine output, serial BMP, and adjust therapy as needed  - IVFH - continue to match diuresis  - check urine Osmo 421, urine sodium 110  - check uric acid - 6.2, CK -WNL   - renal ultrasound - reviewed        VTE Risk Mitigation (From admission, onward)           Ordered     Reason for No Pharmacological VTE Prophylaxis  Once        Question:  Reasons:  Answer:  Risk of Bleeding    11/03/24 2332     IP VTE HIGH RISK PATIENT  Once         11/03/24 2332     Place sequential compression device  Until discontinued         11/03/24 2332                    Discharge Planning   MARA: 11/11/2024     Code Status: Full Code   Is the patient medically ready for discharge?:     Reason for patient still in hospital (select all that apply): Patient trending condition, Laboratory test, and Treatment  Discharge Plan A: Shelter   Discharge Delays: None known at this time              Carmella Jerry NP  Department of Hospital Medicine   [facility] [facility]

## 2024-11-07 NOTE — ASSESSMENT & PLAN NOTE
SHIVAM is likely due to  infection . Baseline creatinine is  1.4 . Most recent creatinine and eGFR are listed below.  Recent Labs     11/05/24  0243 11/06/24  0440 11/07/24  0654   CREATININE 2.0* 2.0* 2.0*   EGFRNORACEVR 30.6* 30.6* 30.6*        Plan  - SHIVAM is stable  - Avoid nephrotoxins and renally dose meds for GFR listed above  - Monitor urine output, serial BMP, and adjust therapy as needed  - IVFH - continue to match diuresis  - check urine Osmo 421, urine sodium 110  - check uric acid - 6.2, CK -WNL   - renal ultrasound - reviewed

## 2024-11-07 NOTE — TELEPHONE ENCOUNTER
Spoke with patient, she is still inpatient and unsure of her discharge date at this time. She will call for a follow up when she is discharged.

## 2024-11-07 NOTE — PLAN OF CARE
[facility] was contacted via telephone for patient regarding her homelessness and needing a place to live. Application was filled out on line two days ago. SW tried to contact [facility] and was not able to leave a message. Nurse EULOGIO will call to see if we can get a voucher for hotel for 5 days for patient to rest after discharge and regain her baring and meet with them if possible for her goals to be met.   11/07/24 1053   Post-Acute Status   Post-Acute Authorization Other  (Community Voucher possibly for hotel)   Discharge Plan   Discharge Plan B Other  (Homeless- VOUCHER HOTEL POSSIBLY)

## 2024-11-07 NOTE — SUBJECTIVE & OBJECTIVE
Interval History:  Seen and examined.  No acute events overnight.  In chair during my examination.  Feels a little better today.  Serum creatinine is still stagnant.    Review of Systems   Musculoskeletal:  Positive for myalgias.   Skin:  Positive for color change and wound.   Neurological:  Positive for headaches.   All other systems reviewed and are negative.    Objective:     Vital Signs (Most Recent):  Temp: 98.1 °F (36.7 °C) (11/07/24 0430)  Pulse: 84 (11/07/24 0855)  Resp: (!) 33 (11/07/24 0855)  BP: (!) 157/94 (11/07/24 0430)  SpO2: 98 % (11/07/24 0430) Vital Signs (24h Range):  Temp:  [97.4 °F (36.3 °C)-98.1 °F (36.7 °C)] 98.1 °F (36.7 °C)  Pulse:  [63-89] 84  Resp:  [14-33] 33  SpO2:  [98 %-100 %] 98 %  BP: (127-172)/() 157/94     Weight: 50 kg (110 lb 3.7 oz)  Body mass index is 17.26 kg/m².    Intake/Output Summary (Last 24 hours) at 11/7/2024 1051  Last data filed at 11/7/2024 0802  Gross per 24 hour   Intake 2469.85 ml   Output 800 ml   Net 1669.85 ml         Physical Exam  Constitutional:       General: She is not in acute distress.     Appearance: She is not ill-appearing, toxic-appearing or diaphoretic.   HENT:      Head: Normocephalic and atraumatic.   Cardiovascular:      Rate and Rhythm: Normal rate and regular rhythm.      Pulses: Normal pulses.      Heart sounds: Normal heart sounds.   Pulmonary:      Effort: Pulmonary effort is normal. No respiratory distress.      Breath sounds: Normal breath sounds.   Musculoskeletal:         General: Normal range of motion.   Skin:     General: Skin is warm and dry.      Findings: Erythema present.      Comments: Left breast dressing C/D/I   Neurological:      Mental Status: She is alert and oriented to person, place, and time. Mental status is at baseline.             Significant Labs: All pertinent labs within the past 24 hours have been reviewed.  CBC:   Recent Labs   Lab 11/06/24 0440   WBC 4.89   HGB 9.6*   HCT 28.7*        CMP:   Recent  Labs   Lab 11/06/24  0440 11/07/24  0654    140   K 4.0 4.3   * 114*   CO2 18* 18*   GLU 79 54*   BUN 21* 19   CREATININE 2.0* 2.0*   CALCIUM 8.1* 8.0*   PROT  --  5.5*   ALBUMIN  --  2.5*   BILITOT  --  0.2   ALKPHOS  --  221*   AST  --  32   ALT  --  33   ANIONGAP 10 8     Magnesium:   Recent Labs   Lab 11/06/24  0440 11/07/24  0654   MG 1.5* 1.7     Urine Studies:   Recent Labs   Lab 11/06/24  0843   COLORU Yellow   APPEARANCEUA Clear   PHUR 6.0   SPECGRAV 1.025   PROTEINUA 2+*   GLUCUA Negative   KETONESU Negative   BILIRUBINUA Negative   OCCULTUA Negative   NITRITE Negative   UROBILINOGEN 1.0   LEUKOCYTESUR Negative   RBCUA 2   WBCUA 1   BACTERIA Rare   SQUAMEPITHEL 2   HYALINECASTS 1       Significant Imaging: I have reviewed all pertinent imaging results/findings within the past 24 hours.    EXAMINATION:  US RETROPERITONEAL COMPLETE    CLINICAL HISTORY:  tre;    TECHNIQUE:  Ultrasound of the kidneys and urinary bladder was performed including color flow and Doppler evaluation of the kidneys.    COMPARISON:  None.    FINDINGS:  Right kidney: The right kidney mild increased echogenicity and measures 11.4 cm. No cortical thinning. No loss of corticomedullary distinction. Resistive index measures 0.72.  No mass. No renal stone. No hydronephrosis.    Left kidney: The left kidney mild increased echogenicity and measures 10.4 cm. No cortical thinning. No loss of corticomedullary distinction. Resistive index measures 0.71.  No mass. No renal stone. No hydronephrosis.    Splenic artery resistive index 0.71.    The bladder is partially distended at the time of scanning and has an unremarkable appearance.  Small amount of free fluid within the pelvis.   Impression:       Mild increased echogenicity within the bilateral kidneys may reflect acute kidney injury and/or chronic medical renal disease.    Physiologic free fluid within the pelvis.      Electronically signed by: Dann Emanuel  Date:  11/06/2024  Time: 12:09

## 2024-11-07 NOTE — TELEPHONE ENCOUNTER
----- Message from Kasia sent at 11/7/2024  4:24 PM CST -----  Contact: Connor Arechiga  Type:  Sooner Appointment Request    Caller is requesting a sooner appointment.  Caller declined first available appointment listed below.  Caller will not accept being placed on the waitlist and is requesting a message be sent to doctor.    Name of Caller:  the patient  When is the first available appointment?  N/a  Symptoms:  hospital f/u  Would the patient rather a call back or a response via [facility]? call  Best Call Back Number:  [phone]  Additional Information:  looking for hospital f/u appt date of discharge 11/09 looking for 1-2 weeks appt
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
## 2024-11-07 NOTE — CARE UPDATE
11/07/24 0855   Patient Assessment/Suction   Level of Consciousness (AVPU) alert   Respiratory Effort Unlabored;Normal   Expansion/Accessory Muscles/Retractions no use of accessory muscles   All Lung Fields Breath Sounds Anterior:;Lateral:;clear   Rhythm/Pattern, Respiratory pattern regular;depth regular   Cough Frequency no cough   Cough Type good;nonproductive   PRE-TX-O2   Device (Oxygen Therapy) room air   Pulse Oximetry Type Intermittent   $ Pulse Oximetry - Multiple Charge Pulse Oximetry - Multiple   Oximetry Probe Status Applied   Pulse 84   Resp (!) 33   Aerosol Therapy   $ Aerosol Therapy Charges PRN treatment not required   Respiratory Treatment Status (SVN) PRN treatment not required   Incentive Spirometer   $ Incentive Spirometer Charges done independently per patient   Incentive Spirometer Predicted Level (mL) 2000   Administration (IS) self-administered   Number of Repetitions (IS) 5   Level Incentive Spirometer (mL) 1500   Patient Tolerance (IS) good   Education   $ Education Incentive Spirometry;15 min

## 2024-11-08 VITALS
OXYGEN SATURATION: 100 % | HEART RATE: 59 BPM | DIASTOLIC BLOOD PRESSURE: 92 MMHG | RESPIRATION RATE: 18 BRPM | HEIGHT: 67 IN | TEMPERATURE: 98 F | BODY MASS INDEX: 17.3 KG/M2 | WEIGHT: 110.25 LBS | SYSTOLIC BLOOD PRESSURE: 136 MMHG

## 2024-11-08 LAB
ANION GAP SERPL CALC-SCNC: 8 MMOL/L (ref 8–16)
BACTERIA SPEC ANAEROBE CULT: NORMAL
BASOPHILS # BLD AUTO: 0.03 K/UL (ref 0–0.2)
BASOPHILS NFR BLD: 0.8 % (ref 0–1.9)
BUN SERPL-MCNC: 20 MG/DL (ref 6–20)
CALCIUM SERPL-MCNC: 8 MG/DL (ref 8.7–10.5)
CHLORIDE SERPL-SCNC: 114 MMOL/L (ref 95–110)
CO2 SERPL-SCNC: 16 MMOL/L (ref 23–29)
CREAT SERPL-MCNC: 1.9 MG/DL (ref 0.5–1.4)
DIFFERENTIAL METHOD BLD: ABNORMAL
EOSINOPHIL # BLD AUTO: 0.1 K/UL (ref 0–0.5)
EOSINOPHIL NFR BLD: 2.3 % (ref 0–8)
ERYTHROCYTE [DISTWIDTH] IN BLOOD BY AUTOMATED COUNT: 14.2 % (ref 11.5–14.5)
EST. GFR  (NO RACE VARIABLE): 32.6 ML/MIN/1.73 M^2
GLUCOSE SERPL-MCNC: 83 MG/DL (ref 70–110)
HCT VFR BLD AUTO: 27.3 % (ref 37–48.5)
HGB BLD-MCNC: 9 G/DL (ref 12–16)
IMM GRANULOCYTES # BLD AUTO: 0.01 K/UL (ref 0–0.04)
IMM GRANULOCYTES NFR BLD AUTO: 0.3 % (ref 0–0.5)
LYMPHOCYTES # BLD AUTO: 1.1 K/UL (ref 1–4.8)
LYMPHOCYTES NFR BLD: 28.5 % (ref 18–48)
MAGNESIUM SERPL-MCNC: 1.5 MG/DL (ref 1.6–2.6)
MCH RBC QN AUTO: 33.5 PG (ref 27–31)
MCHC RBC AUTO-ENTMCNC: 33 G/DL (ref 32–36)
MCV RBC AUTO: 102 FL (ref 82–98)
MONOCYTES # BLD AUTO: 0.3 K/UL (ref 0.3–1)
MONOCYTES NFR BLD: 8.7 % (ref 4–15)
NEUTROPHILS # BLD AUTO: 2.3 K/UL (ref 1.8–7.7)
NEUTROPHILS NFR BLD: 59.4 % (ref 38–73)
NRBC BLD-RTO: 0 /100 WBC
PHOSPHATE SERPL-MCNC: 4 MG/DL (ref 2.7–4.5)
PLATELET # BLD AUTO: 137 K/UL (ref 150–450)
PMV BLD AUTO: 10 FL (ref 9.2–12.9)
POTASSIUM SERPL-SCNC: 4.3 MMOL/L (ref 3.5–5.1)
RBC # BLD AUTO: 2.69 M/UL (ref 4–5.4)
SODIUM SERPL-SCNC: 138 MMOL/L (ref 136–145)
WBC # BLD AUTO: 3.89 K/UL (ref 3.9–12.7)

## 2024-11-08 PROCEDURE — S4991 NICOTINE PATCH NONLEGEND: HCPCS | Performed by: SPECIALIST

## 2024-11-08 PROCEDURE — 36415 COLL VENOUS BLD VENIPUNCTURE: CPT | Performed by: NURSE PRACTITIONER

## 2024-11-08 PROCEDURE — 63600175 PHARM REV CODE 636 W HCPCS: Performed by: SPECIALIST

## 2024-11-08 PROCEDURE — 80048 BASIC METABOLIC PNL TOTAL CA: CPT | Performed by: NURSE PRACTITIONER

## 2024-11-08 PROCEDURE — 25000003 PHARM REV CODE 250: Performed by: SPECIALIST

## 2024-11-08 PROCEDURE — 63600175 PHARM REV CODE 636 W HCPCS: Performed by: NURSE PRACTITIONER

## 2024-11-08 PROCEDURE — 99239 HOSP IP/OBS DSCHRG MGMT >30: CPT | Mod: ,,, | Performed by: STUDENT IN AN ORGANIZED HEALTH CARE EDUCATION/TRAINING PROGRAM

## 2024-11-08 PROCEDURE — 99900035 HC TECH TIME PER 15 MIN (STAT)

## 2024-11-08 PROCEDURE — 83735 ASSAY OF MAGNESIUM: CPT | Performed by: NURSE PRACTITIONER

## 2024-11-08 PROCEDURE — 84100 ASSAY OF PHOSPHORUS: CPT | Performed by: NURSE PRACTITIONER

## 2024-11-08 PROCEDURE — 25000003 PHARM REV CODE 250: Performed by: STUDENT IN AN ORGANIZED HEALTH CARE EDUCATION/TRAINING PROGRAM

## 2024-11-08 PROCEDURE — 25000003 PHARM REV CODE 250: Performed by: NURSE PRACTITIONER

## 2024-11-08 PROCEDURE — 85025 COMPLETE CBC W/AUTO DIFF WBC: CPT | Performed by: NURSE PRACTITIONER

## 2024-11-08 RX ORDER — HYDRALAZINE HYDROCHLORIDE 25 MG/1
25 TABLET, FILM COATED ORAL EVERY 8 HOURS
Qty: 90 TABLET | Refills: 0 | Status: SHIPPED | OUTPATIENT
Start: 2024-11-08 | End: 2025-11-08

## 2024-11-08 RX ORDER — LANOLIN ALCOHOL/MO/W.PET/CERES
800 CREAM (GRAM) TOPICAL ONCE
Status: COMPLETED | OUTPATIENT
Start: 2024-11-08 | End: 2024-11-08

## 2024-11-08 RX ORDER — ONDANSETRON 4 MG/1
8 TABLET, ORALLY DISINTEGRATING ORAL EVERY 8 HOURS PRN
Qty: 20 TABLET | Refills: 0 | Status: SHIPPED | OUTPATIENT
Start: 2024-11-08

## 2024-11-08 RX ORDER — OXYCODONE AND ACETAMINOPHEN 5; 325 MG/1; MG/1
1 TABLET ORAL EVERY 8 HOURS PRN
Qty: 12 TABLET | Refills: 0 | Status: SHIPPED | OUTPATIENT
Start: 2024-11-08

## 2024-11-08 RX ORDER — CLINDAMYCIN HYDROCHLORIDE 300 MG/1
300 CAPSULE ORAL EVERY 6 HOURS
Qty: 40 CAPSULE | Refills: 0 | Status: SHIPPED | OUTPATIENT
Start: 2024-11-08 | End: 2024-11-18

## 2024-11-08 RX ADMIN — SENNOSIDES AND DOCUSATE SODIUM 2 TABLET: 50; 8.6 TABLET ORAL at 09:11

## 2024-11-08 RX ADMIN — HYDROMORPHONE HYDROCHLORIDE 1 MG: 1 INJECTION, SOLUTION INTRAMUSCULAR; INTRAVENOUS; SUBCUTANEOUS at 01:11

## 2024-11-08 RX ADMIN — OXYCODONE HYDROCHLORIDE AND ACETAMINOPHEN 1 TABLET: 10; 325 TABLET ORAL at 09:11

## 2024-11-08 RX ADMIN — THERA TABS 1 TABLET: TAB at 09:11

## 2024-11-08 RX ADMIN — PANTOPRAZOLE SODIUM 40 MG: 40 TABLET, DELAYED RELEASE ORAL at 09:11

## 2024-11-08 RX ADMIN — HYDROMORPHONE HYDROCHLORIDE 1 MG: 1 INJECTION, SOLUTION INTRAMUSCULAR; INTRAVENOUS; SUBCUTANEOUS at 05:11

## 2024-11-08 RX ADMIN — OXYCODONE HYDROCHLORIDE AND ACETAMINOPHEN 1 TABLET: 10; 325 TABLET ORAL at 04:11

## 2024-11-08 RX ADMIN — CLONIDINE HYDROCHLORIDE 0.1 MG: 0.1 TABLET ORAL at 04:11

## 2024-11-08 RX ADMIN — LINEZOLID 600 MG: 600 TABLET, FILM COATED ORAL at 09:11

## 2024-11-08 RX ADMIN — HYDRALAZINE HYDROCHLORIDE 25 MG: 25 TABLET ORAL at 05:11

## 2024-11-08 RX ADMIN — MAGNESIUM OXIDE 800 MG: 400 TABLET ORAL at 09:11

## 2024-11-08 RX ADMIN — LACTULOSE 15 G: 20 SOLUTION ORAL at 09:11

## 2024-11-08 RX ADMIN — LAMIVUDINE 150 MG: 150 TABLET, FILM COATED ORAL at 09:11

## 2024-11-08 RX ADMIN — NICOTINE 1 PATCH: 21 PATCH, EXTENDED RELEASE TRANSDERMAL at 09:11

## 2024-11-08 RX ADMIN — TACROLIMUS 6 MG: 1 CAPSULE ORAL at 08:11

## 2024-11-08 NOTE — NURSING
Refused dressing change at this time, wants done later since was done after dinner yesterday.  Had large amount stool in commode, mostly liquid

## 2024-11-08 NOTE — PLAN OF CARE
Problem: Adult Inpatient Plan of Care  Goal: Plan of Care Review  2024 by Megan Mora RN  Outcome: Progressing  2024 by Megan Mora RN  Outcome: Progressing  Goal: Patient-Specific Goal (Individualized)  2024 by Megan Mora RN  Outcome: Progressing  2024 by Megan Mora RN  Outcome: Progressing  Goal: Absence of Hospital-Acquired Illness or Injury  2024 by Megan Mora RN  Outcome: Progressing  2024 by Megan Mora RN  Outcome: Progressing  Goal: Optimal Comfort and Wellbeing  2024 by Megan Mora RN  Outcome: Progressing  2024 by Megan Mora RN  Outcome: Progressing  Goal: Readiness for Transition of Care  2024 by Megan Mora RN  Outcome: Progressing  2024 by Megan Mora RN  Outcome: Progressing     Problem: Skin or Soft Tissue Infection  Goal: Absence of Infection Signs and Symptoms  2024 by Megan Mora RN  Outcome: Progressing  2024 by Megan Mora RN  Outcome: Progressing     Problem: Pain Acute  Goal: Optimal Pain Control and Function  2024 by Megan Mora RN  Outcome: Progressing  2024 by Megan Mora RN  Outcome: Progressing     Problem: Hypertension Acute  Goal: Blood Pressure Within Desired Range  2024 by Megan Mora RN  Outcome: Progressing  2024 by Megan Mora RN  Outcome: Progressing     Problem: Infection  Goal: Absence of Infection Signs and Symptoms  2024 by Megan Mora RN  Outcome: Progressing  2024 by Brimingham, Megan L., RN  Outcome: Progressing     Problem:  Fall Injury Risk  Goal: Absence of Fall, Infant Drop and Related Injury  2024 0511 by Megan Mora, RN  Outcome: Progressing  2024 2251 by Megan Mora  COLIN, RN  Outcome: Progressing     Problem: Wound  Goal: Optimal Coping  11/8/2024 0511 by Megan Mora, RN  Outcome: Progressing  11/7/2024 2251 by Megan Mora RN  Outcome: Progressing  Goal: Optimal Functional Ability  11/8/2024 0511 by Megan Mora, RN  Outcome: Progressing  11/7/2024 2251 by Megan Mora RN  Outcome: Progressing  Goal: Absence of Infection Signs and Symptoms  11/8/2024 0511 by Megan Mora, RN  Outcome: Progressing  11/7/2024 2251 by Megan Mora RN  Outcome: Progressing  Goal: Improved Oral Intake  11/8/2024 0511 by Megan Mora, RN  Outcome: Progressing  11/7/2024 2251 by Megan Mora RN  Outcome: Progressing  Goal: Optimal Pain Control and Function  11/8/2024 0511 by Megan Mora, RN  Outcome: Progressing  11/7/2024 2251 by Megan Mora RN  Outcome: Progressing  Goal: Skin Health and Integrity  11/8/2024 0511 by Megan Mora, RN  Outcome: Progressing  11/7/2024 2251 by Megan Mora RN  Outcome: Progressing  Goal: Optimal Wound Healing  11/8/2024 0511 by Megan Mora, RN  Outcome: Progressing  11/7/2024 2251 by Megan Mora RN  Outcome: Progressing     Problem: Acute Kidney Injury/Impairment  Goal: Fluid and Electrolyte Balance  11/8/2024 0511 by Megan Mora, RN  Outcome: Progressing  11/7/2024 2251 by Megan Mora, RN  Outcome: Progressing  Goal: Improved Oral Intake  11/8/2024 0511 by Megan Mora, RN  Outcome: Progressing  11/7/2024 2251 by Megan Mora, RN  Outcome: Progressing  Goal: Effective Renal Function  11/8/2024 0511 by Megan Mora, RN  Outcome: Progressing  11/7/2024 2251 by Megan Mora RN  Outcome: Progressing     Problem: Gas Exchange Impaired  Goal: Optimal Gas Exchange  11/8/2024 0511 by Megna Mora RN  Outcome: Progressing  11/7/2024 2251 by Megan Mora RN  Outcome:  Progressing     Problem: Skin Injury Risk Increased  Goal: Skin Health and Integrity  11/8/2024 0511 by Megan Mora, RN  Outcome: Progressing  11/7/2024 2251 by Megan Mora, RN  Outcome: Progressing

## 2024-11-08 NOTE — PLAN OF CARE
Problem: Adult Inpatient Plan of Care  Goal: Plan of Care Review  2024 by Megan Mora RN  Outcome: Progressing  2024 by Megan Mora RN  Outcome: Progressing  Goal: Patient-Specific Goal (Individualized)  2024 by Megan Mora RN  Outcome: Progressing  2024 by Megan Mora RN  Outcome: Progressing  Goal: Absence of Hospital-Acquired Illness or Injury  2024 by Megan Mora RN  Outcome: Progressing  2024 by Megan Mora RN  Outcome: Progressing  Goal: Optimal Comfort and Wellbeing  2024 by Megan Mora RN  Outcome: Progressing  2024 by Megan Mora RN  Outcome: Progressing  Goal: Readiness for Transition of Care  2024 by Megan Mora RN  Outcome: Progressing  2024 by Megan Mora RN  Outcome: Progressing     Problem: Skin or Soft Tissue Infection  Goal: Absence of Infection Signs and Symptoms  2024 by Megan Mora RN  Outcome: Progressing  2024 by Megan Mora RN  Outcome: Progressing     Problem: Pain Acute  Goal: Optimal Pain Control and Function  2024 by Megan Mora RN  Outcome: Progressing  2024 by Megan Mora RN  Outcome: Progressing     Problem: Hypertension Acute  Goal: Blood Pressure Within Desired Range  2024 by Megan oMra RN  Outcome: Progressing  2024 by Megan Mora RN  Outcome: Progressing     Problem: Infection  Goal: Absence of Infection Signs and Symptoms  2024 by Megan Mora RN  Outcome: Progressing  2024 by Brimingham, Megan L., RN  Outcome: Progressing     Problem:  Fall Injury Risk  Goal: Absence of Fall, Infant Drop and Related Injury  2024 by Megan Mora, RN  Outcome: Progressing  2024 by Megan Mora  COLIN, RN  Outcome: Progressing     Problem: Wound  Goal: Optimal Coping  11/7/2024 2251 by Megan Mora, RN  Outcome: Progressing  11/7/2024 2251 by Megan Mora RN  Outcome: Progressing  Goal: Optimal Functional Ability  11/7/2024 2251 by Megan Mora, RN  Outcome: Progressing  11/7/2024 2251 by Megan Mora, RN  Outcome: Progressing  Goal: Absence of Infection Signs and Symptoms  11/7/2024 2251 by Megan Mora, RN  Outcome: Progressing  11/7/2024 2251 by Megan Mora, RN  Outcome: Progressing  Goal: Improved Oral Intake  11/7/2024 2251 by Megan Mora, RN  Outcome: Progressing  11/7/2024 2251 by Megan Mora RN  Outcome: Progressing  Goal: Optimal Pain Control and Function  11/7/2024 2251 by Megan Mora, RN  Outcome: Progressing  11/7/2024 2251 by Megan Mora RN  Outcome: Progressing  Goal: Skin Health and Integrity  11/7/2024 2251 by Megan Mora, RN  Outcome: Progressing  11/7/2024 2251 by Megan Mora, RN  Outcome: Progressing  Goal: Optimal Wound Healing  11/7/2024 2251 by Megan Mora, RN  Outcome: Progressing  11/7/2024 2251 by Megan Mora RN  Outcome: Progressing     Problem: Acute Kidney Injury/Impairment  Goal: Fluid and Electrolyte Balance  11/7/2024 2251 by Megan Mora, RN  Outcome: Progressing  11/7/2024 2251 by Mgean Mora, RN  Outcome: Progressing  Goal: Improved Oral Intake  11/7/2024 2251 by Megan Mora, RN  Outcome: Progressing  11/7/2024 2251 by Megan Mora, RN  Outcome: Progressing  Goal: Effective Renal Function  11/7/2024 2251 by Megan Mora, RN  Outcome: Progressing  11/7/2024 2251 by Megan Mora RN  Outcome: Progressing     Problem: Gas Exchange Impaired  Goal: Optimal Gas Exchange  11/7/2024 2251 by Megan Mora RN  Outcome: Progressing  11/7/2024 2251 by Megan Mora RN  Outcome:  Progressing     Problem: Skin Injury Risk Increased  Goal: Skin Health and Integrity  11/7/2024 2251 by Megan Mora, RN  Outcome: Progressing  11/7/2024 2251 by Megan Mora, RN  Outcome: Progressing

## 2024-11-08 NOTE — DISCHARGE SUMMARY
[facility], [place] Medicine  Discharge Summary      Patient Name: Jose Berman  MRN: [id number]  YONG: [id number]  Patient Class: IP- Inpatient  Admission Date: 11/3/2024  Hospital Length of Stay: 5 days  Discharge Date and Time: 11/8/2024 11:32 AM  Attending Physician: Esperanza att. providers found   Discharging Provider: Carmella Jerry NP  Primary Care Provider: Halie Villagran NP    Primary Care Team: Networked reference to record PCT     HPI:   Jose Berman is a 43 y/o F with past medical history significant for liver transplant, hep B, bipolar disorder, RA and HTN who presented to the ED with c/o left breast pain and swelling which began on October 22 when she injured her left breast while carrying a box.  She was seen by her transplant team the following day and was advised to go to the ED for concerns over possible abscess formation; however, she did not follow up with anyone at that time.  Over the last 2 weeks she has had progressive swelling and erythema to the left breast and now reports some drainage from the site.  Denies fever, chills, N/V or any other symptoms.  While in the ED, an I&D was performed at bedside with cultures collected.  She was started on IV rocephin and IV vancomycin.  Labs are significant for SHIVAM and hypokalemia.  She is admitted to the service of hospital medicine for further medical management.    Procedure(s) (LRB):  INCISION AND DRAINAGE, ABSCESS (Left)      Hospital Course:   Patient admitted with left breast abscess and acute tubular necrosis.  General surgery was consulted.  Cultures were sent to the lab.  She was placed on IV vancomycin and ceftriaxone.  She was also noted to have urinary tract infection sensitive to ceftriaxone.  She was treated with IV fluid hydration.  She underwent incision and drainage in the OR and wound care was performed postoperatively.  Her serum creatinine remained stagnant.  Urine sodium and urine Osmo  were obtained.  Her pain was controlled with both oral and IV narcotics.  Wound cultures revealed MRSA.  Appropriate antibiotics were sent to patient's pharmacy prior to discharge.  Ambulatory referral to Nephrology was also placed prior to discharge.  Discharge instructions as well as return precautions were discussed with patient with good understanding.     Goals of Care Treatment Preferences:  Code Status: Full Code      SDOH Screening:  The patient was screened for food insecurity, housing instability, transportation needs, utility difficulties, and interpersonal safety. The social determinant(s) of health identified as a concern this admission are:  Housing instability  Food insecurity  Transportation difficulties    The plan to address these concerns is:  provided a list of local shelter, abuse hotline and [employer] phone number and paperwork for housing.     Social Drivers of Health with Concerns     Food Insecurity: Food Insecurity Present (11/7/2024)   Housing Stability: High Risk (11/7/2024)   Transportation Needs: Unmet Transportation Needs (11/4/2024)   Utilities: Patient Declined (11/7/2024)        Consults:   Consults (From admission, onward)          Status Ordering Provider     Inpatient consult to Case Management  Once        Provider:  (Not yet assigned)    Completed NEISHA CASTILLO     Inpatient consult to General Surgery  Once        Provider:  (Not yet assigned)    Completed BERONICA CLARKE            No new Assessment & Plan notes have been filed under this hospital service since the last note was generated.  Service: Hospital Medicine    Final Active Diagnoses:    Diagnosis Date Noted POA    PRINCIPAL PROBLEM:  Breast abscess of female [N61.1] 11/03/2024 Yes    Hepatitis B [B19.10] 11/03/2024 Yes    Cigarette nicotine dependence without complication [F17.210] 11/03/2024 Yes    Rheumatoid arthritis [M06.9] 05/01/2017 Yes    UTI (urinary tract infection) [N39.0] 08/29/2016 Yes     S/P liver transplant [Z94.4] 08/26/2015 Not Applicable    ATN (acute tubular necrosis) [N17.0] 08/26/2015 Yes      Problems Resolved During this Admission:       Discharged Condition: good    Disposition: Home or Self Care    Follow Up:   Follow-up Information       Marcos Anderson MD. Go on 11/14/2024.    Specialty: General Surgery  Why: appointment Thursday Nov 14th at 10:45am for surgery follow up  Contact information:  [address]  [address] [facility] [place] [address]  [phone]               Miranda Dyer MD. Go on 11/22/2024.    Specialty: Family Medicine  Why: appointment Friday Nov 22nd at 9:30am for hospital follow up  Contact information:  [address] [facility] [place] [address]  [phone]                           Patient Instructions:      Comprehensive metabolic panel   Standing Status: Future Standing Exp. Date: 02/06/26     Order Specific Question Answer Comments   Send normal result to authorizing provider's In Basket if patient is active on MyChart: Yes      Magnesium   Standing Status: Future Standing Exp. Date: 02/06/26     Order Specific Question Answer Comments   Send normal result to authorizing provider's In Basket if patient is active on MyChart: Yes      Phosphorus   Standing Status: Future Standing Exp. Date: 02/06/26     Order Specific Question Answer Comments   Send normal result to authorizing provider's In Basket if patient is active on MyChart: Yes      Ambulatory referral/consult to Outpatient Case Management   Referral Priority: Routine Referral Type: Consultation   Referral Reason: Specialty Services Required   Number of Visits Requested: 1     Ambulatory referral/consult to Nephrology   Standing Status: Future   Referral Priority: Routine Referral Type: Consultation   Referral Reason: Specialty Services Required   Requested Specialty: Nephrology   Number of Visits Requested: 1     Diet Adult Regular     Change dressing (specify)   Order Comments: Dressing  change: Daily with iodoform gauze, wet gauze with sterile saline and pack wound, cover with sterile dressing.     Reason for not Ordering Smoking Cessation Referral     Order Specific Question Answer Comments   Reason for not ordering: Patient refused      Reason for not Prescribing Nicotine Replacement     Order Specific Question Answer Comments   Reason for not Prescribing: Patient refused      Activity as tolerated       Significant Diagnostic Studies: Labs: CMP   Recent Labs   Lab 11/07/24  0654 11/08/24  0737    138   K 4.3 4.3   * 114*   CO2 18* 16*   GLU 54* 83   BUN 19 20   CREATININE 2.0* 1.9*   CALCIUM 8.0* 8.0*   PROT 5.5*  --    ALBUMIN 2.5*  --    BILITOT 0.2  --    ALKPHOS 221*  --    AST 32  --    ALT 33  --    ANIONGAP 8 8    and CBC   Recent Labs   Lab 11/08/24  0737   WBC 3.89*   HGB 9.0*   HCT 27.3*   *     Microbiology:     Microbiology Results (last 7 days)       Procedure Component Value Units Date/Time    Culture, Anaerobic [[id number]] Collected: 11/03/24 2258    Order Status: Completed Specimen: Abscess from Breast, Left Updated: 11/08/24 1113     Anaerobic Culture No anaerobes isolated    Aerobic culture [[id number]]  (Abnormal)  (Susceptibility) Collected: 11/03/24 2258    Order Status: Completed Specimen: Abscess from Breast, Left Updated: 11/07/24 1318     Aerobic Bacterial Culture METHICILLIN RESISTANT STAPHYLOCOCCUS AUREUS  Few      Aerobic culture [[id number]]  (Abnormal)  (Susceptibility) Collected: 11/04/24 1520    Order Status: Completed Specimen: Abscess from Breast, Left Updated: 11/07/24 1307     Aerobic Bacterial Culture METHICILLIN RESISTANT STAPHYLOCOCCUS AUREUS  Few      Blood Culture #1 **CANNOT BE ORDERED STAT** [[id number]] Collected: 11/03/24 2032    Order Status: Completed Specimen: Blood from Peripheral, Forearm, Left Updated: 11/07/24 1222     Blood Culture, Routine No Growth to date      No Growth to date      No Growth to date      No Growth to  date    Blood Culture #2 **CANNOT BE ORDERED STAT** [[id number]] Collected: 11/03/24 2031    Order Status: Completed Specimen: Blood from Peripheral, Forearm, Right Updated: 11/07/24 1222     Blood Culture, Routine No Growth to date      No Growth to date      No Growth to date      No Growth to date    Culture, Anaerobe [[id number]] Collected: 11/04/24 1520    Order Status: Completed Specimen: Abscess from Breast, Left Updated: 11/07/24 1106     Anaerobic Culture Culture in progress    Urine culture [[id number]]  (Abnormal)  (Susceptibility) Collected: 11/04/24 0058    Order Status: Completed Specimen: Urine Updated: 11/06/24 0936     Urine Culture, Routine ESCHERICHIA COLI  >100,000 cfu/ml      Narrative:      Preferred Collection Type->Urine, Clean Catch  Specimen Source->Urine            Radiology:     US Retroperitoneal Complete [[id number]] Resulted: 11/06/24 1209   Order Status: Completed Updated: 11/06/24 1212   Narrative:     EXAMINATION:  US RETROPERITONEAL COMPLETE    CLINICAL HISTORY:  tre;    TECHNIQUE:  Ultrasound of the kidneys and urinary bladder was performed including color flow and Doppler evaluation of the kidneys.    COMPARISON:  None.    FINDINGS:  Right kidney: The right kidney mild increased echogenicity and measures 11.4 cm. No cortical thinning. No loss of corticomedullary distinction. Resistive index measures 0.72.  No mass. No renal stone. No hydronephrosis.    Left kidney: The left kidney mild increased echogenicity and measures 10.4 cm. No cortical thinning. No loss of corticomedullary distinction. Resistive index measures 0.71.  No mass. No renal stone. No hydronephrosis.    Splenic artery resistive index 0.71.    The bladder is partially distended at the time of scanning and has an unremarkable appearance.  Small amount of free fluid within the pelvis.   Impression:       Mild increased echogenicity within the bilateral kidneys may reflect acute kidney injury and/or chronic medical  renal disease.    Physiologic free fluid within the pelvis.      Electronically signed by: Dann Emanuel  Date: 11/06/2024  Time: 12:09   CT Chest Without Contrast [[id number]] Resulted: 11/03/24 2148   Order Status: Completed Updated: 11/03/24 2150   Narrative:     EXAMINATION:  CT CHEST WITHOUT CONTRAST    CLINICAL HISTORY:  Soft tissue mass, chest, US/xray nondiagnostic;left breast abscess ( SHIVAM);    TECHNIQUE:  Low dose axial images, sagittal and coronal reformations were obtained from the thoracic inlet to the lung bases. Contrast was not administered.    COMPARISON:  None.    FINDINGS:  Examination of the vascular and soft tissue structures at the base of the neck is unremarkable, noting assessment is limited by significant streak artifact throughout this region.    There are bilateral breast implants present.  There is an abnormal complex collection or soft tissue density within the medial aspect of the left breast, anterior to the breast implant and medial to the nipple.  This measures approximately 1.9 x 4.6 x 5.0 cm.  There is adjacent fat stranding and suspected mild overlying skin thickening.  No associated soft tissue gas present.  No associated calcification appreciated.    The thoracic aorta maintains normal caliber, contour, and course without significant atherosclerotic calcification within its course.  The heart is not significantly enlarged.  There is trace pericardial fluid.The esophagus maintains a normal course and caliber. There is no bulky axillary or mediastinal lymph node enlargement appreciated allowing for noncontrast technique.    The trachea is midline and the proximal airways are patent. There is biapical pleuroparenchymal scarring present, right greater than left.  There are scattered pulmonary micronodules throughout the lungs measuring up to 0.4 cm.  There are scattered subsegmental opacities at the lung bases, right greater than left.  No significant pleural fluid.    Limited  views of the upper abdomen demonstrate no acute abnormalities.  The gallbladder is surgically absent.  There is mild dilatation of the extrahepatic common bile duct which may relate to post cholecystectomy status.  There are postoperative changes of the stomach..    Osseous structures demonstrate mild degenerative changes..   Impression:       Abnormal complex collection or soft tissue density within the left breast,, anterior to the breast implant and medial to the nipple with dimensions as above.  This is nonspecific and could reflect a breast abscess or phlegmon in the appropriate clinical setting.  Underlying mass or neoplastic process is not excluded.  Clinical correlation with patient history and physical examination is advised.  Recommend short-term follow-up with dedicated breast imaging when clinically appropriate    Scattered pulmonary micronodules throughout the lungs.  For multiple solid nodules all <6 mm, Fleischner Society 2017 guidelines recommend no routine follow up for a low risk patient, or follow up with non-contrast chest CT at 12 months after discovery in a high risk patient.    Scattered subsegmental opacities at the lung bases, right greater than left.  These may reflect atelectasis or scarring, although early infectious process not excluded, particularly the right lung base.    Additional incidental findings as above.      Electronically signed by: Mike Plascencia MD  Date: 11/03/2024  Time: 21:48        Pending Diagnostic Studies:       None           Medications:  Reconciled Home Medications:      Medication List        START taking these medications      clindamycin 300 MG capsule  Commonly known as: CLEOCIN  Take 1 capsule (300 mg total) by mouth every 6 (six) hours. for 10 days     hydrALAZINE 25 MG tablet  Commonly known as: APRESOLINE  Take 1 tablet (25 mg total) by mouth every 8 (eight) hours.     ondansetron 4 MG Tbdl  Commonly known as: ZOFRAN-ODT  Take 2 tablets (8 mg total) by  mouth every 8 (eight) hours as needed.     oxyCODONE-acetaminophen 5-325 mg per tablet  Commonly known as: PERCOCET  Take 1 tablet by mouth every 8 (eight) hours as needed for Pain.            CONTINUE taking these medications      albuterol 90 mcg/actuation inhaler  Commonly known as: PROAIR HFA  Inhale 2 puffs into the lungs every 6 (six) hours as needed for Wheezing.     lamiVUDine 150 MG Tab  Commonly known as: EPIVIR  Take 1 tablet (150 mg total) by mouth once daily.     multivitamin tablet  Commonly known as: THERAGRAN  Take 1 tablet by mouth once daily.     pantoprazole 40 MG tablet  Commonly known as: PROTONIX  Take 1 tablet (40 mg total) by mouth once daily.     tacrolimus 1 MG Cap  Commonly known as: PROGRAF  Take 6 capsules (6 mg total) by mouth every 12 (twelve) hours.     ursodioL 500 MG tablet  Commonly known as: ACTIGALL  Take 1 tablet (500 mg total) by mouth 2 (two) times daily.            STOP taking these medications      lisinopriL-hydrochlorothiazide 10-12.5 mg per tablet  Commonly known as: PRINZIDE,ZESTORETIC              Indwelling Lines/Drains at time of discharge:   Lines/Drains/Airways       None                   Time spent on the discharge of patient: 38 minutes         Carmella Jerry NP  Department of [facility] Medicine  [facility]

## 2024-11-08 NOTE — AI DETERIORATION ALERT
"Artificial Intelligence Notification  [address] [place] [address]  Phone: [phone]    This documentation was triggered by an Artificial Intelligence Notification:    Admit Date: 11/3/2024   LOS: 4  Code Status: Full Code  : 1980  Age: 44 y.o.  Weight:   Wt Readings from Last 1 Encounters:   24 50 kg (110 lb 3.7 oz)        Sex: female  Bed:   MRN: [id number]  Attending Physician: Filiberto Hicks MD     Date of Alert: 2024  Time AI Alert Received: 9pm     Patient with breast abscess in liver XPL  VS stable below  Not sure why AI alarmed   She is in SHIVAM, but this is not new since admission (baseline Cr 1.4 -> 2 over the past several days)    Vitals:    24   BP:    Pulse:    Resp: 18   Temp:    BP (!) 172/99 (BP Location: Right arm, Patient Position: Sitting)   Pulse 92   Temp 98.6 °F (37 °C) (Oral)   Resp 18   Ht 5' 7" (1.702 m)   Wt 50 kg (110 lb 3.7 oz)   SpO2 98%   Breastfeeding No   BMI 17.26 kg/m²     SpO2: 98 %       Patient Condition: stable    Recent Results (from the past 48 hours)   Basic Metabolic Panel (BMP)    Collection Time: 24  4:40 AM   Result Value Ref Range    Sodium 142 136 - 145 mmol/L    Potassium 4.0 3.5 - 5.1 mmol/L    Chloride 114 (H) 95 - 110 mmol/L    CO2 18 (L) 23 - 29 mmol/L    Glucose 79 70 - 110 mg/dL    BUN 21 (H) 6 - 20 mg/dL    Creatinine 2.0 (H) 0.5 - 1.4 mg/dL    Calcium 8.1 (L) 8.7 - 10.5 mg/dL    Anion Gap 10 8 - 16 mmol/L    eGFR 30.6 (A) >60 mL/min/1.73 m^2   Magnesium    Collection Time: 24  4:40 AM   Result Value Ref Range    Magnesium 1.5 (L) 1.6 - 2.6 mg/dL   CBC with Automated Differential    Collection Time: 24  4:40 AM   Result Value Ref Range    WBC 4.89 3.90 - 12.70 K/uL    RBC 2.87 (L) 4.00 - 5.40 M/uL    Hemoglobin 9.6 (L) 12.0 - 16.0 g/dL    Hematocrit 28.7 (L) 37.0 - 48.5 %     (H) 82 - 98 fL    MCH 33.4 (H) 27.0 - 31.0 pg    MCHC 33.4 32.0 - 36.0 g/dL    RDW " 14.4 11.5 - 14.5 %    Platelets 153 150 - 450 K/uL    MPV 10.4 9.2 - 12.9 fL    Immature Granulocytes 0.2 0.0 - 0.5 %    Gran # (ANC) 3.3 1.8 - 7.7 K/uL    Immature Grans (Abs) 0.01 0.00 - 0.04 K/uL    Lymph # 1.2 1.0 - 4.8 K/uL    Mono # 0.4 0.3 - 1.0 K/uL    Eos # 0.1 0.0 - 0.5 K/uL    Baso # 0.02 0.00 - 0.20 K/uL    nRBC 0 0 /100 WBC    Gran % 66.7 38.0 - 73.0 %    Lymph % 23.7 18.0 - 48.0 %    Mono % 7.6 4.0 - 15.0 %    Eosinophil % 1.4 0.0 - 8.0 %    Basophil % 0.4 0.0 - 1.9 %    Differential Method Automated    Phosphorus    Collection Time: 11/06/24  4:40 AM   Result Value Ref Range    Phosphorus 3.8 2.7 - 4.5 mg/dL   Uric acid    Collection Time: 11/06/24  4:40 AM   Result Value Ref Range    Uric Acid 6.2 (H) 2.4 - 5.7 mg/dL   CK    Collection Time: 11/06/24  4:40 AM   Result Value Ref Range    CPK 45 20 - 180 U/L   POCT glucose    Collection Time: 11/06/24  7:33 AM   Result Value Ref Range    POCT Glucose 88 70 - 110 mg/dL   Osmolality, urine    Collection Time: 11/06/24  8:43 AM   Result Value Ref Range    Osmolality, Urine 429 50 - 1200 mOsm/kg   Sodium, urine, random    Collection Time: 11/06/24  8:43 AM   Result Value Ref Range    Sodium, Urine 110 20 - 250 mmol/L   Urinalysis    Collection Time: 11/06/24  8:43 AM   Result Value Ref Range    Specimen UA Urine, Unspecified     Color, UA Yellow Yellow, Straw, Shama    Appearance, UA Clear Clear    pH, UA 6.0 5.0 - 8.0    Specific Gravity, UA 1.025 1.005 - 1.030    Protein, UA 2+ (A) Negative    Glucose, UA Negative Negative    Ketones, UA Negative Negative    Bilirubin (UA) Negative Negative    Occult Blood UA Negative Negative    Nitrite, UA Negative Negative    Urobilinogen, UA 1.0 Negative EU/dL    Leukocytes, UA Negative Negative   Urinalysis Microscopic    Collection Time: 11/06/24  8:43 AM   Result Value Ref Range    RBC, UA 2 0 - 4 /hpf    WBC, UA 1 0 - 5 /hpf    Bacteria Rare None-Occ /hpf    Squam Epithel, UA 2 /hpf    Hyaline Casts, UA 1 0-1/lpf  /lpf    Microscopic Comment SEE COMMENT    Phosphorus    Collection Time: 11/07/24  6:54 AM   Result Value Ref Range    Phosphorus 3.8 2.7 - 4.5 mg/dL   Comprehensive Metabolic Panel    Collection Time: 11/07/24  6:54 AM   Result Value Ref Range    Sodium 140 136 - 145 mmol/L    Potassium 4.3 3.5 - 5.1 mmol/L    Chloride 114 (H) 95 - 110 mmol/L    CO2 18 (L) 23 - 29 mmol/L    Glucose 54 (L) 70 - 110 mg/dL    BUN 19 6 - 20 mg/dL    Creatinine 2.0 (H) 0.5 - 1.4 mg/dL    Calcium 8.0 (L) 8.7 - 10.5 mg/dL    Total Protein 5.5 (L) 6.0 - 8.4 g/dL    Albumin 2.5 (L) 3.5 - 5.2 g/dL    Total Bilirubin 0.2 0.1 - 1.0 mg/dL    Alkaline Phosphatase 221 (H) 40 - 150 U/L    AST 32 10 - 40 U/L    ALT 33 10 - 44 U/L    eGFR 30.6 (A) >60 mL/min/1.73 m^2    Anion Gap 8 8 - 16 mmol/L   Magnesium    Collection Time: 11/07/24  6:54 AM   Result Value Ref Range    Magnesium 1.7 1.6 - 2.6 mg/dL

## 2024-11-08 NOTE — HOSPITAL COURSE
Patient admitted with left breast abscess and acute tubular necrosis.  General surgery was consulted.  Cultures were sent to the lab.  She was placed on IV vancomycin and ceftriaxone.  She was also noted to have urinary tract infection sensitive to ceftriaxone.  She was treated with IV fluid hydration.  She underwent incision and drainage in the OR and wound care was performed postoperatively.  Her serum creatinine remained stagnant.  Urine sodium and urine Osmo were obtained.  Her pain was controlled with both oral and IV narcotics.  Wound cultures revealed MRSA.  Appropriate antibiotics were sent to patient's pharmacy prior to discharge.  Ambulatory referral to Nephrology was also placed prior to discharge.  Discharge instructions as well as return precautions were discussed with patient with good understanding.

## 2024-11-08 NOTE — NURSING
"Soap suds given per new order for pt complaints of feeling like " will explode" for need for BM.  Tolerated well and instructed to retain as long as possible, unable to retain form longer than a few minutes and had a very small hard stool pass.  "

## 2024-11-08 NOTE — PLAN OF CARE
Problem: Adult Inpatient Plan of Care  Goal: Plan of Care Review  Outcome: Met  Goal: Patient-Specific Goal (Individualized)  Outcome: Met  Goal: Absence of Hospital-Acquired Illness or Injury  Outcome: Met  Goal: Optimal Comfort and Wellbeing  Outcome: Met  Goal: Readiness for Transition of Care  Outcome: Met     Problem: Skin or Soft Tissue Infection  Goal: Absence of Infection Signs and Symptoms  Outcome: Met     Problem: Pain Acute  Goal: Optimal Pain Control and Function  Outcome: Met     Problem: Hypertension Acute  Goal: Blood Pressure Within Desired Range  Outcome: Met     Problem: Infection  Goal: Absence of Infection Signs and Symptoms  Outcome: Met     Problem:  Fall Injury Risk  Goal: Absence of Fall, Infant Drop and Related Injury  Outcome: Met     Problem: Wound  Goal: Optimal Coping  Outcome: Met  Goal: Optimal Functional Ability  Outcome: Met  Goal: Absence of Infection Signs and Symptoms  Outcome: Met  Goal: Improved Oral Intake  Outcome: Met  Goal: Optimal Pain Control and Function  Outcome: Met  Goal: Skin Health and Integrity  Outcome: Met  Goal: Optimal Wound Healing  Outcome: Met     Problem: Acute Kidney Injury/Impairment  Goal: Fluid and Electrolyte Balance  Outcome: Met  Goal: Improved Oral Intake  Outcome: Met  Goal: Effective Renal Function  Outcome: Met     Problem: Gas Exchange Impaired  Goal: Optimal Gas Exchange  Outcome: Met     Problem: Skin Injury Risk Increased  Goal: Skin Health and Integrity  Outcome: Met

## 2024-11-08 NOTE — PLAN OF CARE
Problem: Adult Inpatient Plan of Care  Goal: Plan of Care Review  Outcome: Progressing  Goal: Patient-Specific Goal (Individualized)  Outcome: Progressing  Goal: Absence of Hospital-Acquired Illness or Injury  Outcome: Progressing  Goal: Optimal Comfort and Wellbeing  Outcome: Progressing  Goal: Readiness for Transition of Care  Outcome: Progressing     Problem: Skin or Soft Tissue Infection  Goal: Absence of Infection Signs and Symptoms  Outcome: Progressing     Problem: Pain Acute  Goal: Optimal Pain Control and Function  Outcome: Progressing     Problem: Hypertension Acute  Goal: Blood Pressure Within Desired Range  Outcome: Progressing     Problem: Infection  Goal: Absence of Infection Signs and Symptoms  Outcome: Progressing     Problem:  Fall Injury Risk  Goal: Absence of Fall, Infant Drop and Related Injury  Outcome: Progressing     Problem: Wound  Goal: Optimal Coping  Outcome: Progressing  Goal: Optimal Functional Ability  Outcome: Progressing  Goal: Absence of Infection Signs and Symptoms  Outcome: Progressing  Goal: Improved Oral Intake  Outcome: Progressing  Goal: Optimal Pain Control and Function  Outcome: Progressing  Goal: Skin Health and Integrity  Outcome: Progressing  Goal: Optimal Wound Healing  Outcome: Progressing     Problem: Acute Kidney Injury/Impairment  Goal: Fluid and Electrolyte Balance  Outcome: Progressing  Goal: Improved Oral Intake  Outcome: Progressing  Goal: Effective Renal Function  Outcome: Progressing     Problem: Gas Exchange Impaired  Goal: Optimal Gas Exchange  Outcome: Progressing     Problem: Skin Injury Risk Increased  Goal: Skin Health and Integrity  Outcome: Progressing

## 2024-11-08 NOTE — PLAN OF CARE
Verbal follow up appointments with Dr Dyer & Dr Anderson provided to patient. NP will place a referral for nephrology & someone will call her with an appointment. Demonstrated understanding by verbal feedback.  will see to assist with resources for housing.

## 2024-11-08 NOTE — NURSING
New medications and follow up appointments gone over with patient and boyfriend who verbalized understanding. Dressing change done with boyfriend present. Supplies given to patient for an additional dressing change. Patient belongings gathered. Patient taken off unit via wheelchair
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
## 2024-11-09 LAB
BACTERIA BLD CULT: NORMAL
BACTERIA BLD CULT: NORMAL

## 2024-11-11 ENCOUNTER — PATIENT MESSAGE (OUTPATIENT)
Dept: ADMINISTRATIVE | Facility: OTHER | Age: 44
End: 2024-11-11
Payer: MEDICAID

## 2024-11-11 ENCOUNTER — PATIENT OUTREACH (OUTPATIENT)
Dept: ADMINISTRATIVE | Facility: OTHER | Age: 44
End: 2024-11-11
Payer: MEDICAID

## 2024-11-11 DIAGNOSIS — Z94.4 S/P LIVER TRANSPLANT: Primary | ICD-10-CM

## 2024-11-11 LAB — BACTERIA SPEC ANAEROBE CULT: NORMAL

## 2024-11-11 NOTE — PROGRESS NOTES
CHW - Case Closure  referral location: [facility] hand cock clinic    This Community Health Worker spoke to patient via telephone today.     Pt/Caregiver reported:   Case management referral states:    -- Housing - Patient is currently on the wait list for section 8 housing authority in [place], Also pt has contacted a few homeless programs in [place] but none of the programs have reached back out to her, Also pt is activily looking for income based apartments in [place] no success yet    -- Also pt stated when she at [facility] she filled out an application for an apartment near [facility] (pt forgot the name of the apartment complex) but stated even though she wants to stay in [place] or near it, if theres any apartments near [facility] in [place] CHW can send her the information    -- Food - gets food stamps and goes to food pantries in the surrounding counties       CHW will send some housing resources for [place] and [place] to patient's portal

## 2024-11-12 DIAGNOSIS — Z94.4 S/P LIVER TRANSPLANT: Primary | ICD-10-CM

## 2024-11-14 ENCOUNTER — LAB VISIT (OUTPATIENT)
Dept: LAB | Facility: HOSPITAL | Age: 44
End: 2024-11-14
Attending: INTERNAL MEDICINE
Payer: MEDICAID

## 2024-11-14 ENCOUNTER — OFFICE VISIT (OUTPATIENT)
Dept: SURGERY | Facility: CLINIC | Age: 44
End: 2024-11-14
Payer: MEDICAID

## 2024-11-14 VITALS — WEIGHT: 110.25 LBS | BODY MASS INDEX: 17.3 KG/M2 | HEIGHT: 67 IN

## 2024-11-14 DIAGNOSIS — Z94.4 S/P LIVER TRANSPLANT: ICD-10-CM

## 2024-11-14 DIAGNOSIS — N61.1 BREAST ABSCESS OF FEMALE: Primary | ICD-10-CM

## 2024-11-14 LAB
ALBUMIN SERPL BCP-MCNC: 3.5 G/DL (ref 3.5–5.2)
ALP SERPL-CCNC: 250 U/L (ref 40–150)
ALT SERPL W/O P-5'-P-CCNC: 18 U/L (ref 10–44)
ANION GAP SERPL CALC-SCNC: 10 MMOL/L (ref 8–16)
AST SERPL-CCNC: 24 U/L (ref 10–40)
BASOPHILS # BLD AUTO: 0.03 K/UL (ref 0–0.2)
BASOPHILS NFR BLD: 0.6 % (ref 0–1.9)
BILIRUB SERPL-MCNC: 0.4 MG/DL (ref 0.1–1)
BUN SERPL-MCNC: 29 MG/DL (ref 6–20)
CALCIUM SERPL-MCNC: 8.3 MG/DL (ref 8.7–10.5)
CHLORIDE SERPL-SCNC: 104 MMOL/L (ref 95–110)
CO2 SERPL-SCNC: 24 MMOL/L (ref 23–29)
CREAT SERPL-MCNC: 2.4 MG/DL (ref 0.5–1.4)
DIFFERENTIAL METHOD BLD: ABNORMAL
EOSINOPHIL # BLD AUTO: 0.1 K/UL (ref 0–0.5)
EOSINOPHIL NFR BLD: 1.4 % (ref 0–8)
ERYTHROCYTE [DISTWIDTH] IN BLOOD BY AUTOMATED COUNT: 13.8 % (ref 11.5–14.5)
EST. GFR  (NO RACE VARIABLE): 24.6 ML/MIN/1.73 M^2
GLUCOSE SERPL-MCNC: 80 MG/DL (ref 70–110)
HCT VFR BLD AUTO: 28 % (ref 37–48.5)
HGB BLD-MCNC: 9.6 G/DL (ref 12–16)
IMM GRANULOCYTES # BLD AUTO: 0.02 K/UL (ref 0–0.04)
IMM GRANULOCYTES NFR BLD AUTO: 0.4 % (ref 0–0.5)
LYMPHOCYTES # BLD AUTO: 1.3 K/UL (ref 1–4.8)
LYMPHOCYTES NFR BLD: 26.5 % (ref 18–48)
MCH RBC QN AUTO: 33.3 PG (ref 27–31)
MCHC RBC AUTO-ENTMCNC: 34.3 G/DL (ref 32–36)
MCV RBC AUTO: 97 FL (ref 82–98)
MONOCYTES # BLD AUTO: 0.5 K/UL (ref 0.3–1)
MONOCYTES NFR BLD: 9.9 % (ref 4–15)
NEUTROPHILS # BLD AUTO: 3.1 K/UL (ref 1.8–7.7)
NEUTROPHILS NFR BLD: 61.2 % (ref 38–73)
NRBC BLD-RTO: 0 /100 WBC
PLATELET # BLD AUTO: 168 K/UL (ref 150–450)
PMV BLD AUTO: 9.2 FL (ref 9.2–12.9)
POTASSIUM SERPL-SCNC: 3.6 MMOL/L (ref 3.5–5.1)
PROT SERPL-MCNC: 6.8 G/DL (ref 6–8.4)
RBC # BLD AUTO: 2.88 M/UL (ref 4–5.4)
SODIUM SERPL-SCNC: 138 MMOL/L (ref 136–145)
WBC # BLD AUTO: 5.05 K/UL (ref 3.9–12.7)

## 2024-11-14 PROCEDURE — 99213 OFFICE O/P EST LOW 20 MIN: CPT | Mod: PBBFAC | Performed by: SPECIALIST

## 2024-11-14 PROCEDURE — 85025 COMPLETE CBC W/AUTO DIFF WBC: CPT | Performed by: INTERNAL MEDICINE

## 2024-11-14 PROCEDURE — 80197 ASSAY OF TACROLIMUS: CPT | Performed by: INTERNAL MEDICINE

## 2024-11-14 PROCEDURE — 1160F RVW MEDS BY RX/DR IN RCRD: CPT | Mod: CPTII,,, | Performed by: SPECIALIST

## 2024-11-14 PROCEDURE — 80321 ALCOHOLS BIOMARKERS 1OR 2: CPT | Performed by: INTERNAL MEDICINE

## 2024-11-14 PROCEDURE — 1159F MED LIST DOCD IN RCRD: CPT | Mod: CPTII,,, | Performed by: SPECIALIST

## 2024-11-14 PROCEDURE — 99999 PR PBB SHADOW E&M-EST. PATIENT-LVL III: CPT | Mod: PBBFAC,,, | Performed by: SPECIALIST

## 2024-11-14 PROCEDURE — 4010F ACE/ARB THERAPY RXD/TAKEN: CPT | Mod: CPTII,,, | Performed by: SPECIALIST

## 2024-11-14 PROCEDURE — 3044F HG A1C LEVEL LT 7.0%: CPT | Mod: CPTII,,, | Performed by: SPECIALIST

## 2024-11-14 PROCEDURE — 99024 POSTOP FOLLOW-UP VISIT: CPT | Mod: ,,, | Performed by: SPECIALIST

## 2024-11-14 PROCEDURE — 80053 COMPREHEN METABOLIC PANEL: CPT | Performed by: INTERNAL MEDICINE

## 2024-11-14 PROCEDURE — 80307 DRUG TEST PRSMV CHEM ANLYZR: CPT | Performed by: INTERNAL MEDICINE

## 2024-11-14 NOTE — PROGRESS NOTES
Patient is a 44 y.o. female who presents for postoperative evaluation following  I and D of left breast abscess    Chief Complaint   Patient presents with    Post-op Evaluation     I & D left breast abscess DOS 11/04/24        There were no vitals filed for this visit.     Subjective      Physical Exam     Incision healing nicely by secondary intent    Drains none    Pathology none    Assessment & Plan     Status post I&D of left breast doing well no implant exposed at the time of I&D.  Breast is less erythematous.  Recommendations continue wet-to-dry dressings    No orders of the defined types were placed in this encounter.       Follow-up p.r.n., return to clinic should the breasts become red and erythematous    Marcos Anderson MD  General Surgery  [address] [facility].   [facility] [place] [address]      Patient instructed that best way to communicate with my office staff is for patient to get on the [facility] patient portal to expedite communication and communication issues that may occur.  Patient was given instructions on how to get on the portal.  I encouraged patient to obtain portal access as well.  Ultimately it is up to the patient to obtain access.  Patient voiced understanding.

## 2024-11-15 ENCOUNTER — PATIENT MESSAGE (OUTPATIENT)
Dept: TRANSPLANT | Facility: CLINIC | Age: 44
End: 2024-11-15
Payer: MEDICAID

## 2024-11-15 DIAGNOSIS — Z94.4 S/P LIVER TRANSPLANT: ICD-10-CM

## 2024-11-15 LAB — TACROLIMUS BLD-MCNC: 6.6 NG/ML (ref 5–15)

## 2024-11-15 NOTE — TELEPHONE ENCOUNTER
Informed pt to reduce prograf to 6 mg in am and 5 mg in pm. Will repeat labs in 1 week.       ----- Message from Quita Wynn MD sent at 11/15/2024  2:20 PM CST -----  Lower prograf to 6/5 from 6/6 and repeat labs in 1 week - please let patient know.

## 2024-11-16 RX ORDER — TACROLIMUS 1 MG/1
CAPSULE ORAL
Qty: 330 CAPSULE | Refills: 5 | Status: SHIPPED | OUTPATIENT
Start: 2024-11-16

## 2024-11-18 DIAGNOSIS — Z94.4 S/P LIVER TRANSPLANT: Primary | ICD-10-CM

## 2024-11-18 LAB
AMPHETAMINES SERPL QL: NEGATIVE
BARBITURATES SERPL QL SCN: NEGATIVE
BENZODIAZ SERPL QL SCN: NEGATIVE
BZE SERPL QL: NEGATIVE
CARBOXYTHC SERPL QL SCN: NEGATIVE
CLINICAL BIOCHEMIST REVIEW: NORMAL
ETHANOL SERPL QL SCN: NEGATIVE
METHADONE SERPL QL SCN: NEGATIVE
OPIATES SERPL QL SCN: NEGATIVE
PCP SERPL QL SCN: NEGATIVE
PLPETH BLD-MCNC: <10 NG/ML
POPETH BLD-MCNC: <10 NG/ML
PROPOXYPH SERPL QL: NEGATIVE

## 2024-11-27 ENCOUNTER — TELEPHONE (OUTPATIENT)
Dept: TRANSPLANT | Facility: CLINIC | Age: 44
End: 2024-11-27
Payer: MEDICAID

## 2024-11-29 ENCOUNTER — TELEPHONE (OUTPATIENT)
Dept: FAMILY MEDICINE | Facility: CLINIC | Age: 44
End: 2024-11-29
Payer: MEDICAID

## 2024-11-29 NOTE — TELEPHONE ENCOUNTER
"Call client back for further advice and assistance. Offered same day appt for further/sooner evaluation with Nurse Practitioner Mellissa Laws [place] location due to provider's not available, also recommended vitual same day evaluation, Urgent Care/ ED if symptoms continue or worsen. Pt mentions " I will contact my surgeon first and I will schedule an appt on my clemente if needed it, thank you". Pt verbalizes understanding of prompt care intervention.   "

## 2024-12-02 ENCOUNTER — TELEPHONE (OUTPATIENT)
Dept: FAMILY MEDICINE | Facility: CLINIC | Age: 44
End: 2024-12-02
Payer: MEDICAID

## 2024-12-02 NOTE — TELEPHONE ENCOUNTER
Attempted to contact patient regarding missed appointment, no answer, left message for patient to call back

## 2024-12-05 ENCOUNTER — TELEPHONE (OUTPATIENT)
Dept: TRANSPLANT | Facility: CLINIC | Age: 44
End: 2024-12-05
Payer: MEDICAID

## 2024-12-05 NOTE — LETTER
December 5, 2024    Jose Berman  [address] [facility] [place] [address]          Dear Jose Berman:  MRN: [id number]    Your lab work was due to be drawn on 12/2/24.  We contacted your lab and were unable to get test results.  It is very important to get your labs drawn as scheduled.  We cannot monitor you for rejection, infections, or drug toxicity side effects without lab results.  Please call us at [phone] as soon as possible to let us know when you plan to have labs drawn.    Sincerely,        Your Liver Transplant Coordinator    [facility]  [address] [place] [id number]  [phone]

## 2024-12-10 ENCOUNTER — TELEPHONE (OUTPATIENT)
Dept: ADMINISTRATIVE | Facility: HOSPITAL | Age: 44
End: 2024-12-10
Payer: MEDICAID

## 2025-01-13 ENCOUNTER — PATIENT MESSAGE (OUTPATIENT)
Dept: TRANSPLANT | Facility: CLINIC | Age: 45
End: 2025-01-13
Payer: MEDICAID

## 2025-01-13 ENCOUNTER — PATIENT MESSAGE (OUTPATIENT)
Dept: FAMILY MEDICINE | Facility: CLINIC | Age: 45
End: 2025-01-13
Payer: MEDICAID

## 2025-01-29 ENCOUNTER — DOCUMENTATION ONLY (OUTPATIENT)
Dept: FAMILY MEDICINE | Facility: CLINIC | Age: 45
End: 2025-01-29
Payer: MEDICAID

## 2025-01-31 ENCOUNTER — TELEPHONE (OUTPATIENT)
Dept: TRANSPLANT | Facility: CLINIC | Age: 45
End: 2025-01-31
Payer: MEDICAID

## 2025-01-31 NOTE — LETTER
January 31, 2025    Jose Berman  [address] [place] [address]          Dear Jose Berman:  MRN: [id number]    Your lab work was due to be drawn on 12/2/24.  We contacted your lab and were unable to get test results.  It is very important to get your labs drawn as scheduled.  We cannot monitor you for rejection, infections, or drug toxicity side effects without lab results.  Please call us at [phone] as soon as possible to let us know when you plan to have labs drawn.    Sincerely,    Marie Ramos, RN,BSN,CCTC    Your Liver Transplant Coordinator    [facility]  [address] [place] [address]  [phone]

## 2025-03-10 ENCOUNTER — HOSPITAL ENCOUNTER (EMERGENCY)
Facility: HOSPITAL | Age: 45
Discharge: LEFT BEFORE DISCHARGE | End: 2025-03-10
Attending: EMERGENCY MEDICINE
Payer: MEDICAID

## 2025-03-10 ENCOUNTER — OFFICE VISIT (OUTPATIENT)
Dept: FAMILY MEDICINE | Facility: CLINIC | Age: 45
End: 2025-03-10
Payer: MEDICAID

## 2025-03-10 VITALS
HEART RATE: 108 BPM | OXYGEN SATURATION: 97 % | TEMPERATURE: 98 F | BODY MASS INDEX: 18.74 KG/M2 | DIASTOLIC BLOOD PRESSURE: 98 MMHG | RESPIRATION RATE: 20 BRPM | SYSTOLIC BLOOD PRESSURE: 178 MMHG | WEIGHT: 119.38 LBS | HEIGHT: 67 IN

## 2025-03-10 VITALS
SYSTOLIC BLOOD PRESSURE: 212 MMHG | BODY MASS INDEX: 18.8 KG/M2 | HEART RATE: 81 BPM | OXYGEN SATURATION: 100 % | DIASTOLIC BLOOD PRESSURE: 136 MMHG | RESPIRATION RATE: 15 BRPM | HEIGHT: 67 IN | WEIGHT: 119.81 LBS

## 2025-03-10 DIAGNOSIS — Z13.1 SCREENING FOR DIABETES MELLITUS (DM): ICD-10-CM

## 2025-03-10 DIAGNOSIS — I10 ESSENTIAL HYPERTENSION: Primary | ICD-10-CM

## 2025-03-10 DIAGNOSIS — R79.89 ELEVATED BRAIN NATRIURETIC PEPTIDE (BNP) LEVEL: ICD-10-CM

## 2025-03-10 DIAGNOSIS — I10 MALIGNANT HYPERTENSION: ICD-10-CM

## 2025-03-10 DIAGNOSIS — Z94.4 S/P LIVER TRANSPLANT: ICD-10-CM

## 2025-03-10 DIAGNOSIS — R79.89 ELEVATED D-DIMER: ICD-10-CM

## 2025-03-10 DIAGNOSIS — Z13.6 ENCOUNTER FOR LIPID SCREENING FOR CARDIOVASCULAR DISEASE: ICD-10-CM

## 2025-03-10 DIAGNOSIS — N17.9 ACUTE KIDNEY INJURY: ICD-10-CM

## 2025-03-10 DIAGNOSIS — M79.89 RIGHT LEG SWELLING: ICD-10-CM

## 2025-03-10 DIAGNOSIS — E83.51 HYPOCALCEMIA: ICD-10-CM

## 2025-03-10 DIAGNOSIS — Z79.899 HIGH RISK MEDICATION USE: ICD-10-CM

## 2025-03-10 DIAGNOSIS — I10 HYPERTENSION, UNSPECIFIED TYPE: Primary | ICD-10-CM

## 2025-03-10 DIAGNOSIS — M79.89 LEG SWELLING: ICD-10-CM

## 2025-03-10 DIAGNOSIS — R60.0 BILATERAL LOWER EXTREMITY EDEMA: ICD-10-CM

## 2025-03-10 DIAGNOSIS — N18.9 CHRONIC KIDNEY DISEASE, UNSPECIFIED CKD STAGE: ICD-10-CM

## 2025-03-10 DIAGNOSIS — E87.6 HYPOKALEMIA: ICD-10-CM

## 2025-03-10 DIAGNOSIS — Z13.220 ENCOUNTER FOR LIPID SCREENING FOR CARDIOVASCULAR DISEASE: ICD-10-CM

## 2025-03-10 LAB
ALBUMIN SERPL BCP-MCNC: 3.2 G/DL (ref 3.5–5.2)
ALP SERPL-CCNC: 317 U/L (ref 40–150)
ALT SERPL W/O P-5'-P-CCNC: 36 U/L (ref 10–44)
ANION GAP SERPL CALC-SCNC: 15 MMOL/L (ref 8–16)
AST SERPL-CCNC: 53 U/L (ref 10–40)
BASOPHILS # BLD AUTO: 0.04 K/UL (ref 0–0.2)
BASOPHILS NFR BLD: 0.5 % (ref 0–1.9)
BILIRUB SERPL-MCNC: 0.6 MG/DL (ref 0.1–1)
BNP SERPL-MCNC: 245 PG/ML (ref 0–99)
BUN SERPL-MCNC: 69 MG/DL (ref 6–20)
CALCIUM SERPL-MCNC: 6.6 MG/DL (ref 8.7–10.5)
CHLORIDE SERPL-SCNC: 104 MMOL/L (ref 95–110)
CO2 SERPL-SCNC: 19 MMOL/L (ref 23–29)
CREAT SERPL-MCNC: 4.4 MG/DL (ref 0.5–1.4)
D DIMER PPP IA.FEU-MCNC: 1.93 MG/L FEU
DIFFERENTIAL METHOD BLD: ABNORMAL
EOSINOPHIL # BLD AUTO: 0.1 K/UL (ref 0–0.5)
EOSINOPHIL NFR BLD: 1.9 % (ref 0–8)
ERYTHROCYTE [DISTWIDTH] IN BLOOD BY AUTOMATED COUNT: 14.6 % (ref 11.5–14.5)
EST. GFR  (NO RACE VARIABLE): 11.9 ML/MIN/1.73 M^2
GLUCOSE SERPL-MCNC: 78 MG/DL (ref 70–110)
HCT VFR BLD AUTO: 24.7 % (ref 37–48.5)
HGB BLD-MCNC: 8.5 G/DL (ref 12–16)
IMM GRANULOCYTES # BLD AUTO: 0.02 K/UL (ref 0–0.04)
IMM GRANULOCYTES NFR BLD AUTO: 0.3 % (ref 0–0.5)
INR PPP: 1 (ref 0.8–1.2)
LYMPHOCYTES # BLD AUTO: 1.2 K/UL (ref 1–4.8)
LYMPHOCYTES NFR BLD: 16.4 % (ref 18–48)
MAGNESIUM SERPL-MCNC: 1.8 MG/DL (ref 1.6–2.6)
MCH RBC QN AUTO: 33.9 PG (ref 27–31)
MCHC RBC AUTO-ENTMCNC: 34.4 G/DL (ref 32–36)
MCV RBC AUTO: 98 FL (ref 82–98)
MONOCYTES # BLD AUTO: 0.6 K/UL (ref 0.3–1)
MONOCYTES NFR BLD: 8.3 % (ref 4–15)
NEUTROPHILS # BLD AUTO: 5.4 K/UL (ref 1.8–7.7)
NEUTROPHILS NFR BLD: 72.6 % (ref 38–73)
NRBC BLD-RTO: 0 /100 WBC
PHOSPHATE SERPL-MCNC: 6.6 MG/DL (ref 2.7–4.5)
PLATELET # BLD AUTO: 165 K/UL (ref 150–450)
PMV BLD AUTO: 10.3 FL (ref 9.2–12.9)
POTASSIUM SERPL-SCNC: 3 MMOL/L (ref 3.5–5.1)
PROT SERPL-MCNC: 7.2 G/DL (ref 6–8.4)
PROTHROMBIN TIME: 11.2 SEC (ref 9–12.5)
RBC # BLD AUTO: 2.51 M/UL (ref 4–5.4)
SODIUM SERPL-SCNC: 138 MMOL/L (ref 136–145)
WBC # BLD AUTO: 7.39 K/UL (ref 3.9–12.7)

## 2025-03-10 PROCEDURE — 63600175 PHARM REV CODE 636 W HCPCS: Performed by: EMERGENCY MEDICINE

## 2025-03-10 PROCEDURE — 83880 ASSAY OF NATRIURETIC PEPTIDE: CPT | Performed by: EMERGENCY MEDICINE

## 2025-03-10 PROCEDURE — 71045 X-RAY EXAM CHEST 1 VIEW: CPT | Mod: 26,,, | Performed by: RADIOLOGY

## 2025-03-10 PROCEDURE — 85379 FIBRIN DEGRADATION QUANT: CPT | Performed by: EMERGENCY MEDICINE

## 2025-03-10 PROCEDURE — 25000003 PHARM REV CODE 250: Mod: UD | Performed by: EMERGENCY MEDICINE

## 2025-03-10 PROCEDURE — 3008F BODY MASS INDEX DOCD: CPT | Mod: CPTII,,, | Performed by: FAMILY MEDICINE

## 2025-03-10 PROCEDURE — 1159F MED LIST DOCD IN RCRD: CPT | Mod: CPTII,,, | Performed by: FAMILY MEDICINE

## 2025-03-10 PROCEDURE — 99999 PR PBB SHADOW E&M-EST. PATIENT-LVL III: CPT | Mod: PBBFAC,,, | Performed by: FAMILY MEDICINE

## 2025-03-10 PROCEDURE — 36415 COLL VENOUS BLD VENIPUNCTURE: CPT | Performed by: EMERGENCY MEDICINE

## 2025-03-10 PROCEDURE — 85025 COMPLETE CBC W/AUTO DIFF WBC: CPT | Performed by: EMERGENCY MEDICINE

## 2025-03-10 PROCEDURE — 84100 ASSAY OF PHOSPHORUS: CPT | Performed by: EMERGENCY MEDICINE

## 2025-03-10 PROCEDURE — 83735 ASSAY OF MAGNESIUM: CPT | Performed by: EMERGENCY MEDICINE

## 2025-03-10 PROCEDURE — 96374 THER/PROPH/DIAG INJ IV PUSH: CPT

## 2025-03-10 PROCEDURE — 96376 TX/PRO/DX INJ SAME DRUG ADON: CPT

## 2025-03-10 PROCEDURE — 96361 HYDRATE IV INFUSION ADD-ON: CPT

## 2025-03-10 PROCEDURE — 3080F DIAST BP >= 90 MM HG: CPT | Mod: CPTII,,, | Performed by: FAMILY MEDICINE

## 2025-03-10 PROCEDURE — 93971 EXTREMITY STUDY: CPT | Mod: 26,RT,, | Performed by: RADIOLOGY

## 2025-03-10 PROCEDURE — 99213 OFFICE O/P EST LOW 20 MIN: CPT | Mod: PBBFAC,25 | Performed by: FAMILY MEDICINE

## 2025-03-10 PROCEDURE — 99215 OFFICE O/P EST HI 40 MIN: CPT | Mod: 25,S$PBB,, | Performed by: FAMILY MEDICINE

## 2025-03-10 PROCEDURE — 3077F SYST BP >= 140 MM HG: CPT | Mod: CPTII,,, | Performed by: FAMILY MEDICINE

## 2025-03-10 PROCEDURE — 93971 EXTREMITY STUDY: CPT | Mod: TC,RT

## 2025-03-10 PROCEDURE — 99285 EMERGENCY DEPT VISIT HI MDM: CPT | Mod: 25,27

## 2025-03-10 PROCEDURE — 71045 X-RAY EXAM CHEST 1 VIEW: CPT | Mod: TC

## 2025-03-10 PROCEDURE — 85610 PROTHROMBIN TIME: CPT | Performed by: EMERGENCY MEDICINE

## 2025-03-10 PROCEDURE — 80053 COMPREHEN METABOLIC PANEL: CPT | Performed by: EMERGENCY MEDICINE

## 2025-03-10 RX ORDER — POTASSIUM CHLORIDE 20 MEQ/1
40 TABLET, EXTENDED RELEASE ORAL
Status: COMPLETED | OUTPATIENT
Start: 2025-03-10 | End: 2025-03-10

## 2025-03-10 RX ORDER — CLONIDINE HYDROCHLORIDE 0.1 MG/1
0.1 TABLET ORAL
Status: COMPLETED | OUTPATIENT
Start: 2025-03-10 | End: 2025-03-10

## 2025-03-10 RX ORDER — HYDRALAZINE HYDROCHLORIDE 20 MG/ML
10 INJECTION INTRAMUSCULAR; INTRAVENOUS
Status: COMPLETED | OUTPATIENT
Start: 2025-03-10 | End: 2025-03-10

## 2025-03-10 RX ORDER — CLINDAMYCIN HYDROCHLORIDE 150 MG/1
150 CAPSULE ORAL EVERY 8 HOURS
COMMUNITY
Start: 2025-03-08 | End: 2025-03-15

## 2025-03-10 RX ORDER — AMLODIPINE BESYLATE 5 MG/1
2.5 TABLET ORAL DAILY
Qty: 15 TABLET | Refills: 3 | Status: SHIPPED | OUTPATIENT
Start: 2025-03-10 | End: 2025-04-09

## 2025-03-10 RX ORDER — HYDRALAZINE HYDROCHLORIDE 20 MG/ML
20 INJECTION INTRAMUSCULAR; INTRAVENOUS
Status: COMPLETED | OUTPATIENT
Start: 2025-03-10 | End: 2025-03-10

## 2025-03-10 RX ADMIN — SODIUM CHLORIDE 500 ML: 9 INJECTION, SOLUTION INTRAVENOUS at 04:03

## 2025-03-10 RX ADMIN — HYDRALAZINE HYDROCHLORIDE 10 MG: 20 INJECTION, SOLUTION INTRAMUSCULAR; INTRAVENOUS at 06:03

## 2025-03-10 RX ADMIN — CLONIDINE HYDROCHLORIDE 0.1 MG: 0.1 TABLET ORAL at 02:03

## 2025-03-10 RX ADMIN — HYDRALAZINE HYDROCHLORIDE 20 MG: 20 INJECTION INTRAMUSCULAR; INTRAVENOUS at 03:03

## 2025-03-10 RX ADMIN — POTASSIUM CHLORIDE 40 MEQ: 1500 TABLET, EXTENDED RELEASE ORAL at 04:03

## 2025-03-10 NOTE — PROGRESS NOTES
Patient ID: Jose Berman is a 44 y.o. female.    Chief Complaint: Leg Swelling (Bilateral lower ext swelling)    History of Present Illness    CHIEF COMPLAINT:  Jose presents today for worsening leg swelling and redness    HISTORY OF PRESENT ILLNESS:  She reports bilateral leg swelling and redness for one month, with significant worsening over the weekend when swelling tripled in size. She has difficulty with ambulation due to inability to bend legs and reports foot numbness. She experiences migratory leg cramps, starting in the arch of foot then moving to calf. She also reports new daily hand and arm cramping throughout day and night with activities such as cooking and writing, which differs from her previous pattern of only nocturnal leg cramps. She started clindamycin on Saturday for possible cellulitis and takes Lasix for lower extremity swelling.    RECENT HOSPITALIZATIONS:  She was hospitalized in November for kidney issues. She recently underwent emergency surgery for a chest infection that developed behind her implant, which initially required ER visit for wound opening.    PAST MEDICAL HISTORY:  History notable for liver transplant in 2015 secondary to alcohol abuse. She has history of stroke and heart attack attributed to transplant-related complications. She reports recent onset of kidney problems, leading to discontinuation of lisinopril in November.    DIET:  She recently obtained new dentures, which has significantly improved her eating ability after barely being able to eat for the past year. She reports minimal added salt intake, consuming only what is present in prepared foods.    SOCIAL HISTORY:  She smokes approximately one pack of cigarettes every 1.5 days. She has history of alcohol abuse, including consumption of rubbing and drinking alcohol for 3.5 years prior to transplant. She has maintained sobriety since liver transplant in 2015.      ROS:  ROS as indicated in HPI.          Physical Exam     Assessment & Plan    IMPRESSION:  - Assessed patient with history of liver transplant (2015) and recent kidney issues  - Evaluated severely elevated blood pressure (216/130), indicating potential stroke risk  - Considered possibility of heart failure due to significant leg swelling and redness  - Reviewed medication history, noting discontinuation of lisinopril in November due to kidney function concerns  - Assessed recent antibiotic treatment (clindamycin) for potential cellulitis, noting worsening symptoms  - Planned to recheck crucial labs, including liver enzymes and kidney function  - Considered potential ER referral based on blood pressure readings and overall condition    CELLULITIS:  - Continued clindamycin as prescribed by virtual nurse practitioner on Saturday for potential cellulitis.  - Observed redness in legs, noting it could be due to swelling.  - Jose reports worsening condition in legs, possibly cellulitis, with swelling, redness, and difficulty walking and bending.  - Instructed the patient to contact the office if leg swelling and redness worsen or if new symptoms develop.    HYPERTENSIVE CRISIS:  - Measured blood pressure at 216/130, indicating hypertensive crisis.  - Educated the patient on the dangers of elevated blood pressure, particularly in relation to stroke risk.  - Ordered current blood pressure check to determine if ER referral is necessary.  - Considered referral to ER if blood pressure remains in malignant hypertension range.  - Advised complete elimination of salt intake.  - Jose reports history of high blood pressure feeling like migraines.  - Educated the patient on the connection between salt intake and kidney damage.    KIDNEY FUNCTION:  - Discontinued hydralazine due to kidney function concerns.  - Ordered kidney function tests.  - Noted no recent lab checks for kidney function since November.  - Planned to recheck crucial labs.  - Jose reports  kidney problems and medication changes.  - Noted that the patient was supposed to have a referral to a nephrologist.    POTENTIAL HEART FAILURE:  - Assessed the condition and considered possibility of heart failure.  - Informed the patient about the potential link between heart failure and leg swelling.  - Considered referral to cardiologist for evaluation of potential heart failure.  - Planned to check for heart failure if hypertension persists.    LEG SWELLING AND EDEMA:  - Garcia reports severe swelling in legs.  - Observed swelling in legs.  - Noted that the patient has been taking Lasix for edema.  - Garcia to elevate feet above heart level to help reduce swelling.    FOOT WOUND:  - Noted severe wound on foot.  - Jose reports loss of sensation in foot.    CHEST IMPLANT INFECTION:  - Garcia reports infection on chest related to implant.  - Noted that the patient underwent emergency surgery for the infection.    SMOKING CESSATION:  - Advised the patient to quit smoking.  - Inquired about patient's desire to quit smoking.  - Garcia reports smoking about a pack every day and a half.    LIVER TRANSPLANT AND ALCOHOL ABUSE:  - Jose reports history of alcohol abuse leading to liver transplant.  - Noted elevated liver enzymes in past labs.  - Observed that the patient has missed liver doctor appointments and labs.  - Jose reports liver transplant in 2015 due to alcohol abuse.    STROKE HISTORY:  - Acknowledged patient's history of stroke as an ongoing issue.  - Jose reports history of stroke.    HEART ATTACK HISTORY:  - Acknowledged patient's history of heart attack as an ongoing issue.  - Jose reports history of heart attack.    LABS:  - CBC, liver enzymes, and kidney function tests ordered.    FOLLOW UP:  - Discussed   the importance of consistent follow-up visits and adherence to medication regimens.  - Follow up to review lab results and reassess overall condition.  - Contact the office if new  symptoms develop.         Plan:          Essential hypertension  -     Microalbumin/Creatinine Ratio, Urine; Future; Expected date: 03/10/2025  -     CBC Auto Differential; Future; Expected date: 03/10/2025  -     Comprehensive Metabolic Panel; Future; Expected date: 03/10/2025    S/P liver transplant  -     Lipid Panel; Future; Expected date: 03/10/2025  -     Comprehensive Metabolic Panel; Future; Expected date: 03/10/2025    Screening for diabetes mellitus (DM)  -     Hemoglobin A1C; Future; Expected date: 03/10/2025    High risk medication use  -     CBC Auto Differential; Future; Expected date: 03/10/2025  -     Comprehensive Metabolic Panel; Future; Expected date: 03/10/2025    Encounter for lipid screening for cardiovascular disease  -     Microalbumin/Creatinine Ratio, Urine; Future; Expected date: 03/10/2025  -     Lipid Panel; Future; Expected date: 03/10/2025    Bilateral lower extremity edema  -     BNP; Future; Expected date: 03/10/2025    Malignant hypertension  -     cloNIDine tablet 0.1 mg    Patient sent directly to the ER due to malignant hypertension unresponsive to clonidine in clinic    In excessive of 40 minutes total time spent for evaluation and management services. Time included elements of the following: time spent preparing to see patient, obtaining and reviewing separately obtained history, exam, evaluation, counseling and education of patient/family member or care giver, documenting in the HMR, independently interpreting results and communication of results, coordination of care ordering medications, tests, or procedures, referral and communication to other health care professionals.    Follow up if symptoms worsen or fail to improve.    This note was generated with the assistance of ambient listening technology. Verbal consent was obtained by the patient and accompanying visitor(s) for the recording of patient appointment to facilitate this note. I attest to having reviewed and edited  the generated note for accuracy, though some syntax or spelling errors may persist. Please contact the author of this note for any clarification.

## 2025-03-10 NOTE — ED PROVIDER NOTES
Encounter Date: 3/10/2025       History     Chief Complaint   Patient presents with    Leg Swelling     Bilateral LE swelling x 3-4 days. Hx of liver transplant in 2015.     Patient is a 44-year-old female, sent from the family [facility] for evaluation and treatment of elevated blood pressure, and bilateral leg swelling, right worse than left.  Patient states her blood pressure is always elevated, and states she is compliant with her medications.  She states the leg swelling began about 3 or 4 days ago.  The swelling does go down and she keeps her legs elevated, but as soon as she stands up again, they swell.  She denies any shortness of breath.  Denies history of heart failure.  Denies fever or chills.  Past medical history of liver transplant, recent hospitalization here for drainage of an abscess posterior to her left breast implant.  Patient states it for transplant doctors has been managing her blood pressure, but the appointment with family medicine today was an initial appointment so that the family medicine doctors could start managing her blood pressure issues.        Review of patient's allergies indicates:   Allergen Reactions    Methadone Hives and Itching    Penicillins Hives and Shortness Of Breath     Past Medical History:   Diagnosis Date    Alcoholic liver failure 2014    Underwent transplant     Anxiety 1999    Bipolar 1 disorder, depressed 2004    Bipolar 1 disorder, depressed     Informed from pt via Patient Portal     CMV (cytomegalovirus)     Donor CMV Status: positive    Elevated liver function tests 8/4/2023    ETOH abuse 08/27/2019    admits to social drinking    Heart attack 2015    s/p transplant complication and extended hospitalization     Hepatic steatosis 2014    Hepatitis B     Donor HBcAb: positive- taking lamivudine    Hypertension 2009    Kidney failure 2015    s/p transplant complication and extended hospitalization     Lumbago 1999    Macrocytic anemia 2014    Narcotic  overdose 04/2018    hospitalized x 2- See 4/30/2018 office note    Osteoporosis 08/2016    Rheumatoid arthritis 5/1/2017    Skin cancer 1999, 2002, 2016    Stroke 2015    s/p transplant complication and extended hospitalization      Past Surgical History:   Procedure Laterality Date    AUGMENTATION OF BREAST Bilateral 2014    saline    BACK SURGERY  2007    L4-L5 - Laminectomy    BELT ABDOMINOPLASTY  2013    BLADDER SUSPENSION  2004    BREAST SURGERY  2014    Breast Augmentation    CHOLECYSTECTOMY  2009    COLON SURGERY  2099/2010    Kilo-en-Y Gastric Bypass/revision    COSMETIC SURGERY  2013/2014    tummy tuck/breaat augmentation    DILATION AND CURETTAGE OF UTERUS      3 times     ERCP N/A 03/25/2021    Procedure: ERCP (ENDOSCOPIC RETROGRADE CHOLANGIOPANCREATOGRAPHY);  Surgeon: Dewayne Vasquez MD;  Location: [facility] ENDO ([address] [place] [address]);  Service: Endoscopy;  Laterality: N/A;  Covid-19 test 3/22/21 at [facility]    GASTRIC BYPASS  2009, 2010    HYSTERECTOMY  2008    INCISION AND DRAINAGE OF ABSCESS Left 11/04/2024    Procedure: INCISION AND DRAINAGE, ABSCESS;  Surgeon: Marcos Anderson MD;  Location: [facility] OR;  Service: General;  Laterality: Left;    LIVER TRANSPLANT  08/26/2015    Complication of liver transplant: dilated ducts suggestive of obstruction    OOPHORECTOMY      SKIN GRAFT  2013, 2012    SMALL INTESTINE SURGERY  2009/2010    Kilo-en-Y Gastric Bypass/revision    SPINE SURGERY  2006    lumbar laminectomy     Family History   Problem Relation Name Age of Onset    Alcohol abuse Father Dann     Depression Father Dann     Arthritis Father Dann     Colon cancer Father Dann 70    Cancer Father Dann     Depression Mother Cjc     Arthritis Mother Cjc     Hypertension Mother Cjc     Miscarriages / Stillbirths Mother Cjc     Hypertension Maternal Grandmother Dianne     Stroke Maternal Grandmother Dianne     Asthma Son joanne     Asthma Son sowmya     Asthma Son joanne     Asthma Son sowmya     Breast cancer  Neg Hx      Ovarian cancer Neg Hx       Social History[1]  Review of Systems    Physical Exam     Initial Vitals [03/10/25 1500]   BP Pulse Resp Temp SpO2   (!) 218/119 78 20 97.5 °F (36.4 °C) 100 %      MAP       --         Physical Exam    ED Course   Procedures  Labs Reviewed   D DIMER, QUANTITATIVE - Abnormal       Result Value    D-Dimer 1.93 (*)    CBC W/ AUTO DIFFERENTIAL - Abnormal    WBC 7.39      RBC 2.51 (*)     Hemoglobin 8.5 (*)     Hematocrit 24.7 (*)     MCV 98      MCH 33.9 (*)     MCHC 34.4      RDW 14.6 (*)     Platelets 165      MPV 10.3      Immature Granulocytes 0.3      Gran # (ANC) 5.4      Immature Grans (Abs) 0.02      Lymph # 1.2      Mono # 0.6      Eos # 0.1      Baso # 0.04      nRBC 0      Gran % 72.6      Lymph % 16.4 (*)     Mono % 8.3      Eosinophil % 1.9      Basophil % 0.5      Differential Method Automated     COMPREHENSIVE METABOLIC PANEL - Abnormal    Sodium 138      Potassium 3.0 (*)     Chloride 104      CO2 19 (*)     Glucose 78      BUN 69 (*)     Creatinine 4.4 (*)     Calcium 6.6 (*)     Total Protein 7.2      Albumin 3.2 (*)     Total Bilirubin 0.6      Alkaline Phosphatase 317 (*)     AST 53 (*)     ALT 36      eGFR 11.9 (*)     Anion Gap 15      Narrative:     Calcium critical result(s) called and verbal readback obtained from   April Pedro RN ED.  by [facility] 03/10/2025 16:45   B-TYPE NATRIURETIC PEPTIDE - Abnormal     (*)    PHOSPHORUS - Abnormal    Phosphorus 6.6 (*)    PROTIME-INR    Prothrombin Time 11.2      INR 1.0     B-TYPE NATRIURETIC PEPTIDE   MAGNESIUM   PHOSPHORUS   MAGNESIUM    Magnesium 1.8            Imaging Results              US Lower Extremity Veins Right (Final result)  Result time 03/10/25 19:52:17      Final result by Martell Tucker MD (03/10/25 19:52:17)                   Impression:      No evidence of deep venous thrombosis in the right lower extremity.      Electronically signed by: Martell Tucker  MD  Date:    03/10/2025  Time:    19:52               Narrative:    EXAMINATION:  US LOWER EXTREMITY VEINS RIGHT    CLINICAL HISTORY:  Other specified soft tissue disorders    TECHNIQUE:  Duplex and color flow Doppler evaluation and graded compression of the right lower extremity veins was performed.    COMPARISON:  None    FINDINGS:  Right thigh veins: The common femoral, femoral, popliteal, upper greater saphenous, and deep femoral veins are patent and free of thrombus. The veins are normally compressible and have normal phasic flow and augmentation response.    Right calf veins: The visualized calf veins are patent.    Contralateral CFV: The contralateral (left) common femoral vein is patent and free of thrombus.    Miscellaneous: Sub cutaneous edema is noted in the right lower extremity.                                       X-Ray Chest AP Portable (Final result)  Result time 03/10/25 17:13:33      Final result by Mike Plascencia MD (03/10/25 17:13:33)                   Impression:      No convincing radiographic evidence of acute intrathoracic process on this single view.      Electronically signed by: Mike Plascencia MD  Date:    03/10/2025  Time:    17:13               Narrative:    EXAMINATION:  XR CHEST AP PORTABLE    CLINICAL HISTORY:  Other specified soft tissue disorders    TECHNIQUE:  Single frontal view of the chest was performed.    COMPARISON:  CT chest 11/03/2024, chest radiograph 10/07/2015    FINDINGS:  There is artifact relating to bilateral breast implants.  Cardiac silhouette is not significantly enlarged.  There is mild elevation of the right hemidiaphragm.  No large confluent airspace consolidation appreciated throughout the lungs.  No significant volume of pleural fluid or pneumothorax appreciated.  Osseous structures demonstrate degenerative changes.  Surgical clips project over the upper abdomen.                                    X-Rays:   Independently Interpreted Readings:   Other  Readings:  Chest x-ray personally reviewed by me shows clear lungs bilaterally.  No obvious pneumothorax, pneumonia, or infiltrates.  Normal cardiac silhouette, normal skeletal structures.    Medications   hydrALAZINE injection 20 mg (20 mg Intravenous Given 3/10/25 1558)   potassium chloride SA CR tablet 40 mEq (40 mEq Oral Given 3/10/25 1657)   sodium chloride 0.9% bolus 500 mL 500 mL (0 mLs Intravenous Stopped 3/10/25 1806)   hydrALAZINE injection 10 mg (10 mg Intravenous Given 3/10/25 1825)     Medical Decision Making  Differential includes DVT, cellulitis, congestive heart failure, etc.    Labs show a normal white blood cell count, potassium somewhat low at 3.0 so she was supplemented here.  Her calcium is low at 6.6.  Her BUN and creatinine are 69 and 4.4 respectively.  INR is normal at 1.0, BNP elevated at 245.  D-dimer elevated at 1.93.  Patient will need IV fluids secondary to her renal status, but with a BNP of 245, this will need to be done gradually.  Chest x-ray was clear.  An ultrasound is pending.  My suspicion for pulmonary embolus is low given the fact that she has 100% O2 saturations on room air with nonlabored breathing.  Blood pressure has been significantly elevated while here so she has been given IV hydralazine.  At shift change, the patient's care be transferred to Dr. Massey who will await ultrasound results and make disposition.    Amount and/or Complexity of Data Reviewed  Labs: ordered.  Radiology: ordered.    Risk  Prescription drug management.               ED Course as of 03/10/25 2013   Mon Mar 10, 2025   2007 Dr. Massey addendum.  Patient was signed out to me at shift change 6:00 p.m. by Dr. Garza.  Labs reviewed, vital signs reviewed.  Ultrasound showed evidence of DVT.  Recommend transfer to higher level of care, for further evaluation of SHIVAM on CKD, hypertension, elevated BNP. Patient has declined transfer to higher level of care. Risks and benefits discussed. She understands,  and competent able to make decisions.  She is advised to at least follow up with Nephrology outpatient. She verbalized understanding [ES]      ED Course User Index  [ES] Alexx Massey MD                           Clinical Impression:  Final diagnoses:  [M79.89] Right leg swelling  [M79.89] Leg swelling  [E87.6] Hypokalemia  [N17.9] Acute kidney injury  [R79.89] Elevated brain natriuretic peptide (BNP) level  [R79.89] Elevated d-dimer  [E83.51] Hypocalcemia  [N18.9] Chronic kidney disease, unspecified CKD stage  [I10] Hypertension, unspecified type (Primary)  [R60.0] Bilateral lower extremity edema          ED Disposition Condition    AMA Stable                    [1]   Social History  Tobacco Use    Smoking status: Every Day     Current packs/day: 1.00     Average packs/day: 0.8 packs/day for 6.0 years (5.0 ttl pk-yrs)     Types: Cigarettes    Smokeless tobacco: Never   Substance Use Topics    Alcohol use: Yes     Comment: on occasion    Drug use: Not Currently     Types: Amphetamines, Marijuana        Alexx Massey MD  03/10/25 2013

## 2025-03-10 NOTE — LETTER
Patient: Jose Berman  YOB: 1980  Date: 3/10/2025 Time: 8:15 PM  Location: [facility]    Leaving the Hospital Against Medical Advice    Chart #:[id number]    This will certify that I, the undersigned,    ______________________________________________________________________    A patient in the above named medical center, having requested discharge and removal from the medical center against the advice of my attending physician(s), hereby release [facility], its physicians, officers and employees, severally and individually, from any and all liability of any nature whatsoever for any injury or harm or complication of any kind that may result directly or indirectly, by reason of my terminating my stay as a patient at [facility] and my departure from [facility], and hereby waive any and all rights of action I may now have or later acquire as a result of my voluntary departure from [facility] and the termination of my stay as a patient therein.    This release is made with the full knowledge of the danger that may result from the action which I am taking.      Date:_______________________                         ___________________________                                                                                    Patient/Legal Representative    Witness:        ____________________________                          ___________________________  Nurse                                                                        Physician

## 2025-04-14 ENCOUNTER — RESULTS FOLLOW-UP (OUTPATIENT)
Dept: HEPATOLOGY | Facility: CLINIC | Age: 45
End: 2025-04-14
Payer: MEDICAID

## 2025-04-14 ENCOUNTER — TELEPHONE (OUTPATIENT)
Dept: TRANSPLANT | Facility: CLINIC | Age: 45
End: 2025-04-14
Payer: MEDICAID

## 2025-04-14 ENCOUNTER — LAB VISIT (OUTPATIENT)
Dept: LAB | Facility: HOSPITAL | Age: 45
End: 2025-04-14
Attending: INTERNAL MEDICINE
Payer: MEDICAID

## 2025-04-14 DIAGNOSIS — N17.0 ATN (ACUTE TUBULAR NECROSIS): ICD-10-CM

## 2025-04-14 DIAGNOSIS — Z94.4 S/P LIVER TRANSPLANT: ICD-10-CM

## 2025-04-14 LAB
ABSOLUTE EOSINOPHIL (OHS): 0.11 K/UL
ABSOLUTE MONOCYTE (OHS): 0.58 K/UL (ref 0.3–1)
ABSOLUTE NEUTROPHIL COUNT (OHS): 3.75 K/UL (ref 1.8–7.7)
ALBUMIN SERPL BCP-MCNC: 3.4 G/DL (ref 3.5–5.2)
ALP SERPL-CCNC: 282 UNIT/L (ref 40–150)
ALT SERPL W/O P-5'-P-CCNC: 37 UNIT/L (ref 10–44)
ANION GAP (OHS): 15 MMOL/L (ref 8–16)
AST SERPL-CCNC: 76 UNIT/L (ref 11–45)
BASOPHILS # BLD AUTO: 0.02 K/UL
BASOPHILS NFR BLD AUTO: 0.3 %
BILIRUB DIRECT SERPL-MCNC: 0.3 MG/DL (ref 0.1–0.3)
BILIRUB SERPL-MCNC: 0.6 MG/DL (ref 0.1–1)
BUN SERPL-MCNC: 51 MG/DL (ref 6–20)
CALCIUM SERPL-MCNC: 5.5 MG/DL (ref 8.7–10.5)
CHLORIDE SERPL-SCNC: 103 MMOL/L (ref 95–110)
CO2 SERPL-SCNC: 19 MMOL/L (ref 23–29)
CREAT SERPL-MCNC: 6.8 MG/DL (ref 0.5–1.4)
ERYTHROCYTE [DISTWIDTH] IN BLOOD BY AUTOMATED COUNT: 13.1 % (ref 11.5–14.5)
GFR SERPLBLD CREATININE-BSD FMLA CKD-EPI: 7 ML/MIN/1.73/M2
GLUCOSE SERPL-MCNC: 92 MG/DL (ref 70–110)
HCT VFR BLD AUTO: 25.5 % (ref 37–48.5)
HGB BLD-MCNC: 9.1 GM/DL (ref 12–16)
IMM GRANULOCYTES # BLD AUTO: 0.02 K/UL (ref 0–0.04)
IMM GRANULOCYTES NFR BLD AUTO: 0.3 % (ref 0–0.5)
LYMPHOCYTES # BLD AUTO: 1.8 K/UL (ref 1–4.8)
MAGNESIUM SERPL-MCNC: 1.5 MG/DL (ref 1.6–2.6)
MCH RBC QN AUTO: 33 PG (ref 27–31)
MCHC RBC AUTO-ENTMCNC: 35.7 G/DL (ref 32–36)
MCV RBC AUTO: 92 FL (ref 82–98)
NUCLEATED RBC (/100WBC) (OHS): 0 /100 WBC
PHOSPHATE SERPL-MCNC: 6.4 MG/DL (ref 2.7–4.5)
PLATELET # BLD AUTO: 185 K/UL (ref 150–450)
PMV BLD AUTO: 9.7 FL (ref 9.2–12.9)
POTASSIUM SERPL-SCNC: 2.9 MMOL/L (ref 3.5–5.1)
PROT SERPL-MCNC: 6.9 GM/DL (ref 6–8.4)
RBC # BLD AUTO: 2.76 M/UL (ref 4–5.4)
RELATIVE EOSINOPHIL (OHS): 1.8 %
RELATIVE LYMPHOCYTE (OHS): 28.7 % (ref 18–48)
RELATIVE MONOCYTE (OHS): 9.2 % (ref 4–15)
RELATIVE NEUTROPHIL (OHS): 59.7 % (ref 38–73)
SODIUM SERPL-SCNC: 137 MMOL/L (ref 136–145)
WBC # BLD AUTO: 6.28 K/UL (ref 3.9–12.7)

## 2025-04-14 PROCEDURE — 80197 ASSAY OF TACROLIMUS: CPT

## 2025-04-14 PROCEDURE — 84075 ASSAY ALKALINE PHOSPHATASE: CPT

## 2025-04-14 PROCEDURE — 82248 BILIRUBIN DIRECT: CPT

## 2025-04-14 PROCEDURE — 84100 ASSAY OF PHOSPHORUS: CPT

## 2025-04-14 PROCEDURE — 83735 ASSAY OF MAGNESIUM: CPT

## 2025-04-14 PROCEDURE — 85025 COMPLETE CBC W/AUTO DIFF WBC: CPT

## 2025-04-14 PROCEDURE — 36415 COLL VENOUS BLD VENIPUNCTURE: CPT

## 2025-04-14 NOTE — TELEPHONE ENCOUNTER
Received critical lab of calcium of 5.5, creatine of 6.8 and potassium of 2.9. Dr. Wynn made aware via secure chat. Called pt and instructed pt to report to ER immediately for evaluation and treatment. Pt is tearful but verbalizes understanding and states she will go.

## 2025-04-15 LAB — TACROLIMUS BLD-MCNC: 4 NG/ML (ref 5–15)
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
## 2025-04-17 NOTE — TELEPHONE ENCOUNTER
Updated Dr. Wynn that pt did go to [facility] but then left AMA. No additional orders received.       ----- Message from Quita Wynn MD sent at 4/15/2025  3:14 PM CDT -----  Very sad.....  ----- Message -----  From: Marie Ramos RN  Sent: 4/15/2025   2:15 PM CDT  To: Quita Wynn MD    ----- Message from Marie Ramos RN sent at 4/15/2025  2:15 PM CDT -----      ----- Message -----  From: Quita Wynn MD  Sent: 4/15/2025   1:20 PM CDT  To: [facility]Liver Transplant Clinical    Prograf level low considering how high the creat is. Did she get admitted? - please let patient know.  ----- Message -----  From: Lab, Background User  Sent: 4/14/2025  11:19 AM CDT  To: Quita Wynn MD

## 2025-05-02 ENCOUNTER — PATIENT MESSAGE (OUTPATIENT)
Dept: TRANSPLANT | Facility: CLINIC | Age: 45
End: 2025-05-02
Payer: MEDICAID

## 2025-05-16 ENCOUNTER — PATIENT MESSAGE (OUTPATIENT)
Dept: TRANSPLANT | Facility: CLINIC | Age: 45
End: 2025-05-16
Payer: MEDICAID

## 2025-05-21 ENCOUNTER — TELEPHONE (OUTPATIENT)
Dept: TRANSPLANT | Facility: CLINIC | Age: 45
End: 2025-05-21
Payer: MEDICAID

## 2025-05-21 NOTE — TELEPHONE ENCOUNTER
Update received from pt, she has been discharged from the hospital this week and has started outpatient dialysis on Tues, Thurs, and Saturdays. Will reschedule outpatient labs, pt has follow up with local nephrologist.

## 2025-06-06 ENCOUNTER — TELEPHONE (OUTPATIENT)
Dept: TRANSPLANT | Facility: CLINIC | Age: 45
End: 2025-06-06
Payer: MEDICAID

## 2025-06-06 ENCOUNTER — PATIENT MESSAGE (OUTPATIENT)
Dept: TRANSPLANT | Facility: CLINIC | Age: 45
End: 2025-06-06
Payer: MEDICAID

## 2025-06-12 ENCOUNTER — TELEPHONE (OUTPATIENT)
Dept: TRANSPLANT | Facility: CLINIC | Age: 45
End: 2025-06-12
Payer: MEDICAID

## 2025-06-19 DIAGNOSIS — Z13.1 SCREENING FOR DIABETES MELLITUS (DM): ICD-10-CM

## 2025-06-19 DIAGNOSIS — Z13.6 ENCOUNTER FOR LIPID SCREENING FOR CARDIOVASCULAR DISEASE: ICD-10-CM

## 2025-06-19 DIAGNOSIS — Z13.220 ENCOUNTER FOR LIPID SCREENING FOR CARDIOVASCULAR DISEASE: ICD-10-CM

## 2025-06-19 DIAGNOSIS — Z94.4 S/P LIVER TRANSPLANT: Primary | ICD-10-CM

## 2025-06-19 DIAGNOSIS — Z79.899 HIGH RISK MEDICATION USE: ICD-10-CM

## 2025-06-19 DIAGNOSIS — J30.2 SEASONAL ALLERGIES: ICD-10-CM

## 2025-06-19 DIAGNOSIS — Z94.4 S/P LIVER TRANSPLANT: ICD-10-CM

## 2025-06-19 DIAGNOSIS — I10 ESSENTIAL HYPERTENSION: ICD-10-CM

## 2025-06-19 RX ORDER — ALBUTEROL SULFATE 90 UG/1
2 INHALANT RESPIRATORY (INHALATION) EVERY 6 HOURS PRN
Qty: 18 G | Refills: 2 | Status: SHIPPED | OUTPATIENT
Start: 2025-06-19

## 2025-06-19 NOTE — TELEPHONE ENCOUNTER
Refill Routing Note   Medication(s) are not appropriate for processing by [facility] for the following reason(s):        Non-participating provider    [facility] action(s):  Route             Appointments  past 12m or future 3m with PCP    Date Provider   Last Visit   10/18/2023 Halie Villagran NP   Next Visit   Visit date not found Halie Villagran NP   ED visits in past 90 days: 0        Note composed:4:46 PM 06/19/2025

## 2025-06-20 ENCOUNTER — PATIENT MESSAGE (OUTPATIENT)
Dept: TRANSPLANT | Facility: CLINIC | Age: 45
End: 2025-06-20
Payer: MEDICAID

## 2025-06-20 RX ORDER — PANTOPRAZOLE SODIUM 40 MG/1
40 TABLET, DELAYED RELEASE ORAL DAILY
Qty: 30 TABLET | Refills: 11 | Status: SHIPPED | OUTPATIENT
Start: 2025-06-20

## 2025-06-23 ENCOUNTER — TELEPHONE (OUTPATIENT)
Dept: TRANSPLANT | Facility: CLINIC | Age: 45
End: 2025-06-23
Payer: MEDICAID

## 2025-06-23 NOTE — TELEPHONE ENCOUNTER
Copied from CRM #[id number]. Topic: Appointments - Appointment Access  >> Jun 19, 2025  4:03 PM Mandy wrote:  PT called returning missed call to sched labs and appt w/. Please reach out at your earliest convenience    Contact [phone]  >> Jun 20, 2025  8:49 AM Med Assistant Rohini wrote:  RUDY re:  April will contact Ms. Berman on Monday, 6-23-25 due to being out of office today, Friday, 6-20-25 regarding a lab appointment and office visit w/her provider.

## 2025-07-03 ENCOUNTER — PATIENT MESSAGE (OUTPATIENT)
Dept: TRANSPLANT | Facility: CLINIC | Age: 45
End: 2025-07-03

## 2025-07-03 DIAGNOSIS — Z94.4 S/P LIVER TRANSPLANT: ICD-10-CM

## 2025-07-03 RX ORDER — TACROLIMUS 1 MG/1
CAPSULE ORAL
Qty: 330 CAPSULE | Refills: 5 | Status: SHIPPED | OUTPATIENT
Start: 2025-07-03

## 2025-07-03 RX ORDER — LAMIVUDINE 150 MG/1
150 TABLET, FILM COATED ORAL DAILY
Qty: 30 TABLET | Refills: 5 | Status: SHIPPED | OUTPATIENT
Start: 2025-07-03

## 2025-07-03 NOTE — TELEPHONE ENCOUNTER
Pt sent message for transplant med refills. Informed would send to MD but that patient needs transplant labs asap. Multiple attempts made to call and message patient to complete labs.

## 2025-07-07 DIAGNOSIS — Z94.4 S/P LIVER TRANSPLANT: ICD-10-CM

## 2025-07-07 RX ORDER — URSODIOL 500 MG/1
500 TABLET, FILM COATED ORAL 2 TIMES DAILY
Qty: 60 TABLET | Refills: 5 | Status: SHIPPED | OUTPATIENT
Start: 2025-07-07

## 2025-07-07 NOTE — TELEPHONE ENCOUNTER
"  Spoke to pt, missing urosdiol script, has all other meds. Will send to MD. Pt will have labs drawn on 7/23 when she goes to [facility] to have mammogram.         Copied from CRM #[id number]. Topic: General Inquiry - Patient Advice  >> Jul 7, 2025  4:15 PM Gareth wrote:  Consult/Advisory:        Name Of Caller: Self     Contact Preference?:[phone]     What is the nature of the call?: Calling to speak w/  Marie in regards to ALL of her medication pt states she needs everything pt states [facility] do not have audra requesting call back      Additional Notes:  "Thank you for all that you do for our patients"  "

## 2025-07-08 DIAGNOSIS — Z94.4 S/P LIVER TRANSPLANT: Primary | ICD-10-CM

## 2025-07-29 ENCOUNTER — TELEPHONE (OUTPATIENT)
Dept: TRANSPLANT | Facility: CLINIC | Age: 45
End: 2025-07-29
Payer: MEDICAID

## 2025-07-29 NOTE — LETTER
July 29, 2025    Jose Berman  [address] [address] [facility] [place] [address]          Dear Jose Berman:  MRN: [id number]    Your lab work was due to be drawn on 7/23/25.  We contacted your lab and were unable to get test results.  It is very important to get your labs drawn as scheduled.  We cannot monitor you for rejection, infections, or drug toxicity side effects without lab results.  Please call us at [phone] as soon as possible to let us know when you plan to have labs drawn.    Sincerely,    Marie Ramos, RN,BSN,CCTC    Your Liver Transplant Coordinator    [facility]  [address] [place] [id number]  [phone]

## 2025-08-21 ENCOUNTER — TELEPHONE (OUTPATIENT)
Dept: TRANSPLANT | Facility: HOSPITAL | Age: 45
End: 2025-08-21
Payer: MEDICAID

## (undated) DEVICE — ELECTRODE REM PLYHSV RETURN 9

## (undated) DEVICE — PENCIL ZIP PEN EVAC 22MM 10FT

## (undated) DEVICE — GLOVE SENSICARE PI SURG 7

## (undated) DEVICE — GLOVE BIOGEL ECLIPSE SZ 7.5

## (undated) DEVICE — COVER LIGHT HANDLE 80/CA

## (undated) DEVICE — KIT COLLECTION E SWAB REGULAR

## (undated) DEVICE — PAD ABD 8X10 STERILE

## (undated) DEVICE — GLOVE SENSICARE PI GRN 7

## (undated) DEVICE — Device

## (undated) DEVICE — STRIP PKNG IODO STRL 1INX5YD

## (undated) DEVICE — SPONGE GAUZE 4X4 12 PLY STRL

## (undated) DEVICE — TRAY SKIN SCRUB WET PREMIUM

## (undated) DEVICE — CANISTER SUCTION RIGID 3000CC